# Patient Record
Sex: FEMALE | Race: WHITE | NOT HISPANIC OR LATINO | Employment: OTHER | URBAN - METROPOLITAN AREA
[De-identification: names, ages, dates, MRNs, and addresses within clinical notes are randomized per-mention and may not be internally consistent; named-entity substitution may affect disease eponyms.]

---

## 2017-11-27 ENCOUNTER — HOSPITAL ENCOUNTER (INPATIENT)
Facility: HOSPITAL | Age: 72
LOS: 5 days | Discharge: HOME | DRG: 872 | End: 2017-12-02
Attending: EMERGENCY MEDICINE | Admitting: HOSPITALIST
Payer: MEDICARE

## 2017-11-27 DIAGNOSIS — N12 PYELONEPHRITIS: Primary | ICD-10-CM

## 2017-11-27 DIAGNOSIS — N15.1 RENAL ABSCESS: ICD-10-CM

## 2017-11-27 DIAGNOSIS — E87.6 HYPOKALEMIA: ICD-10-CM

## 2017-11-27 DIAGNOSIS — E86.0 DEHYDRATION: ICD-10-CM

## 2017-11-27 DIAGNOSIS — I50.30 (HFPEF) HEART FAILURE WITH PRESERVED EJECTION FRACTION: ICD-10-CM

## 2017-11-27 DIAGNOSIS — R06.02 SHORTNESS OF BREATH: ICD-10-CM

## 2017-11-27 LAB
ALBUMIN SERPL BCP-MCNC: 2.8 G/DL
ALP SERPL-CCNC: 222 U/L
ALT SERPL W/O P-5'-P-CCNC: 49 U/L
ANION GAP SERPL CALC-SCNC: 12 MMOL/L
AST SERPL-CCNC: 28 U/L
BACTERIA #/AREA URNS HPF: ABNORMAL /HPF
BASOPHILS # BLD AUTO: 0.01 K/UL
BASOPHILS NFR BLD: 0.1 %
BILIRUB SERPL-MCNC: 0.6 MG/DL
BILIRUB UR QL STRIP: NEGATIVE
BNP SERPL-MCNC: 28 PG/ML
BUN SERPL-MCNC: 23 MG/DL
CALCIUM SERPL-MCNC: 9.6 MG/DL
CHLORIDE SERPL-SCNC: 92 MMOL/L
CK SERPL-CCNC: 76 U/L
CLARITY UR: ABNORMAL
CO2 SERPL-SCNC: 30 MMOL/L
COLOR UR: YELLOW
CREAT SERPL-MCNC: 1.6 MG/DL
D DIMER PPP IA.FEU-MCNC: 1.59 MG/L FEU
DIFFERENTIAL METHOD: ABNORMAL
EOSINOPHIL # BLD AUTO: 0 K/UL
EOSINOPHIL NFR BLD: 0.1 %
ERYTHROCYTE [DISTWIDTH] IN BLOOD BY AUTOMATED COUNT: 15.5 %
EST. GFR  (AFRICAN AMERICAN): 37 ML/MIN/1.73 M^2
EST. GFR  (NON AFRICAN AMERICAN): 32 ML/MIN/1.73 M^2
FLUAV AG SPEC QL IA: NEGATIVE
FLUBV AG SPEC QL IA: NEGATIVE
GLUCOSE SERPL-MCNC: 134 MG/DL
GLUCOSE UR QL STRIP: NEGATIVE
HCT VFR BLD AUTO: 36.9 %
HGB BLD-MCNC: 12.4 G/DL
HGB UR QL STRIP: NEGATIVE
KETONES UR QL STRIP: NEGATIVE
LACTATE SERPL-SCNC: 0.8 MMOL/L
LEUKOCYTE ESTERASE UR QL STRIP: ABNORMAL
LIPASE SERPL-CCNC: 44 U/L
LIPASE SERPL-CCNC: 46 U/L
LYMPHOCYTES # BLD AUTO: 0.2 K/UL
LYMPHOCYTES NFR BLD: 1.1 %
MAGNESIUM SERPL-MCNC: 1.8 MG/DL
MCH RBC QN AUTO: 28.4 PG
MCHC RBC AUTO-ENTMCNC: 33.6 G/DL
MCV RBC AUTO: 84 FL
MICROSCOPIC COMMENT: ABNORMAL
MONOCYTES # BLD AUTO: 0.1 K/UL
MONOCYTES NFR BLD: 0.5 %
NEUTROPHILS # BLD AUTO: 18.8 K/UL
NEUTROPHILS NFR BLD: 98.2 %
NITRITE UR QL STRIP: NEGATIVE
PH UR STRIP: 5 [PH] (ref 5–8)
PLATELET # BLD AUTO: 348 K/UL
PMV BLD AUTO: 9.2 FL
POTASSIUM SERPL-SCNC: 2.9 MMOL/L
PROT SERPL-MCNC: 7.6 G/DL
PROT UR QL STRIP: NEGATIVE
RBC # BLD AUTO: 4.37 M/UL
RBC #/AREA URNS HPF: 1 /HPF (ref 0–4)
SODIUM SERPL-SCNC: 134 MMOL/L
SP GR UR STRIP: 1 (ref 1–1.03)
SPECIMEN SOURCE: NORMAL
SQUAMOUS #/AREA URNS HPF: ABNORMAL /HPF
T4 FREE SERPL-MCNC: 1.47 NG/DL
TROPONIN I SERPL DL<=0.01 NG/ML-MCNC: 0.02 NG/ML
TSH SERPL DL<=0.005 MIU/L-ACNC: 8.15 UIU/ML
URN SPEC COLLECT METH UR: ABNORMAL
UROBILINOGEN UR STRIP-ACNC: NEGATIVE EU/DL
WBC # BLD AUTO: 19.15 K/UL
WBC #/AREA URNS HPF: 15 /HPF (ref 0–5)
WBC CLUMPS URNS QL MICRO: ABNORMAL

## 2017-11-27 PROCEDURE — 25000003 PHARM REV CODE 250: Performed by: EMERGENCY MEDICINE

## 2017-11-27 PROCEDURE — 87088 URINE BACTERIA CULTURE: CPT

## 2017-11-27 PROCEDURE — 25500020 PHARM REV CODE 255: Performed by: EMERGENCY MEDICINE

## 2017-11-27 PROCEDURE — 93010 ELECTROCARDIOGRAM REPORT: CPT | Mod: ,,, | Performed by: INTERNAL MEDICINE

## 2017-11-27 PROCEDURE — 85025 COMPLETE CBC W/AUTO DIFF WBC: CPT

## 2017-11-27 PROCEDURE — 87400 INFLUENZA A/B EACH AG IA: CPT | Mod: 59

## 2017-11-27 PROCEDURE — 83735 ASSAY OF MAGNESIUM: CPT

## 2017-11-27 PROCEDURE — 80053 COMPREHEN METABOLIC PANEL: CPT

## 2017-11-27 PROCEDURE — 87086 URINE CULTURE/COLONY COUNT: CPT

## 2017-11-27 PROCEDURE — 99285 EMERGENCY DEPT VISIT HI MDM: CPT | Mod: 25

## 2017-11-27 PROCEDURE — 96365 THER/PROPH/DIAG IV INF INIT: CPT

## 2017-11-27 PROCEDURE — 63600175 PHARM REV CODE 636 W HCPCS: Performed by: EMERGENCY MEDICINE

## 2017-11-27 PROCEDURE — 87186 SC STD MICRODIL/AGAR DIL: CPT

## 2017-11-27 PROCEDURE — 84443 ASSAY THYROID STIM HORMONE: CPT

## 2017-11-27 PROCEDURE — 83690 ASSAY OF LIPASE: CPT

## 2017-11-27 PROCEDURE — 82550 ASSAY OF CK (CPK): CPT

## 2017-11-27 PROCEDURE — 83605 ASSAY OF LACTIC ACID: CPT

## 2017-11-27 PROCEDURE — 84439 ASSAY OF FREE THYROXINE: CPT

## 2017-11-27 PROCEDURE — 93005 ELECTROCARDIOGRAM TRACING: CPT

## 2017-11-27 PROCEDURE — 83880 ASSAY OF NATRIURETIC PEPTIDE: CPT

## 2017-11-27 PROCEDURE — 96361 HYDRATE IV INFUSION ADD-ON: CPT

## 2017-11-27 PROCEDURE — 87040 BLOOD CULTURE FOR BACTERIA: CPT | Mod: 59

## 2017-11-27 PROCEDURE — 96375 TX/PRO/DX INJ NEW DRUG ADDON: CPT

## 2017-11-27 PROCEDURE — 12000002 HC ACUTE/MED SURGE SEMI-PRIVATE ROOM

## 2017-11-27 PROCEDURE — 85379 FIBRIN DEGRADATION QUANT: CPT

## 2017-11-27 PROCEDURE — 81000 URINALYSIS NONAUTO W/SCOPE: CPT

## 2017-11-27 PROCEDURE — 84484 ASSAY OF TROPONIN QUANT: CPT

## 2017-11-27 PROCEDURE — 87077 CULTURE AEROBIC IDENTIFY: CPT

## 2017-11-27 RX ORDER — PANTOPRAZOLE SODIUM 40 MG/1
40 TABLET, DELAYED RELEASE ORAL DAILY
COMMUNITY
End: 2018-09-10 | Stop reason: SDUPTHER

## 2017-11-27 RX ORDER — POTASSIUM CHLORIDE 20 MEQ/1
40 TABLET, EXTENDED RELEASE ORAL
Status: COMPLETED | OUTPATIENT
Start: 2017-11-27 | End: 2017-11-27

## 2017-11-27 RX ORDER — FLUTICASONE PROPIONATE 50 MCG
1 SPRAY, SUSPENSION (ML) NASAL DAILY
COMMUNITY

## 2017-11-27 RX ORDER — HYDROXYCHLOROQUINE SULFATE 200 MG/1
TABLET, FILM COATED ORAL DAILY
COMMUNITY
End: 2019-08-14 | Stop reason: SDUPTHER

## 2017-11-27 RX ORDER — AMOXICILLIN 500 MG
2 CAPSULE ORAL DAILY
COMMUNITY

## 2017-11-27 RX ORDER — HYDROCHLOROTHIAZIDE 25 MG/1
25 TABLET ORAL DAILY
COMMUNITY
End: 2018-03-21 | Stop reason: SDUPTHER

## 2017-11-27 RX ORDER — METOPROLOL TARTRATE 50 MG/1
50 TABLET ORAL 2 TIMES DAILY
COMMUNITY
End: 2018-03-21 | Stop reason: SDUPTHER

## 2017-11-27 RX ORDER — ROSUVASTATIN CALCIUM 40 MG/1
10 TABLET, COATED ORAL NIGHTLY
COMMUNITY
End: 2018-03-21 | Stop reason: SDUPTHER

## 2017-11-27 RX ORDER — ONDANSETRON HYDROCHLORIDE 8 MG/1
TABLET, FILM COATED ORAL EVERY 8 HOURS PRN
COMMUNITY
End: 2018-09-10

## 2017-11-27 RX ORDER — DULOXETIN HYDROCHLORIDE 30 MG/1
30 CAPSULE, DELAYED RELEASE ORAL DAILY
COMMUNITY
End: 2017-12-08

## 2017-11-27 RX ORDER — ONDANSETRON 2 MG/ML
4 INJECTION INTRAMUSCULAR; INTRAVENOUS
Status: COMPLETED | OUTPATIENT
Start: 2017-11-27 | End: 2017-11-27

## 2017-11-27 RX ORDER — BUTALBITAL, ACETAMINOPHEN AND CAFFEINE 50; 325; 40 MG/1; MG/1; MG/1
1 TABLET ORAL EVERY 4 HOURS PRN
COMMUNITY
End: 2018-09-10

## 2017-11-27 RX ORDER — LEVOTHYROXINE SODIUM 88 UG/1
88 TABLET ORAL DAILY
COMMUNITY
End: 2018-03-21 | Stop reason: SDUPTHER

## 2017-11-27 RX ADMIN — POTASSIUM CHLORIDE 40 MEQ: 1500 TABLET, EXTENDED RELEASE ORAL at 09:11

## 2017-11-27 RX ADMIN — PIPERACILLIN AND TAZOBACTAM 4.5 G: 4; .5 INJECTION, POWDER, LYOPHILIZED, FOR SOLUTION INTRAVENOUS; PARENTERAL at 11:11

## 2017-11-27 RX ADMIN — SODIUM CHLORIDE 1000 ML: 0.9 INJECTION, SOLUTION INTRAVENOUS at 08:11

## 2017-11-27 RX ADMIN — IOHEXOL 70 ML: 350 INJECTION, SOLUTION INTRAVENOUS at 10:11

## 2017-11-27 RX ADMIN — ONDANSETRON 4 MG: 2 INJECTION INTRAMUSCULAR; INTRAVENOUS at 08:11

## 2017-11-27 RX ADMIN — SODIUM CHLORIDE 1000 ML: 0.9 INJECTION, SOLUTION INTRAVENOUS at 09:11

## 2017-11-28 PROBLEM — N39.0 SEPSIS SECONDARY TO UTI: Status: ACTIVE | Noted: 2017-11-28

## 2017-11-28 PROBLEM — A41.9 SEPSIS SECONDARY TO UTI: Status: ACTIVE | Noted: 2017-11-28

## 2017-11-28 PROBLEM — D72.9 NEUTROPHILIC LEUKOCYTOSIS: Status: ACTIVE | Noted: 2017-11-28

## 2017-11-28 PROBLEM — N15.1 PERINEPHRIC ABSCESS: Status: ACTIVE | Noted: 2017-11-28

## 2017-11-28 PROBLEM — I10 HYPERTENSION: Status: ACTIVE | Noted: 2017-11-28

## 2017-11-28 PROBLEM — E07.9 THYROID DISEASE: Status: ACTIVE | Noted: 2017-11-28

## 2017-11-28 LAB
ANION GAP SERPL CALC-SCNC: 8 MMOL/L
BASOPHILS # BLD AUTO: 0.03 K/UL
BASOPHILS NFR BLD: 0.1 %
BUN SERPL-MCNC: 20 MG/DL
CALCIUM SERPL-MCNC: 8.3 MG/DL
CHLORIDE SERPL-SCNC: 102 MMOL/L
CO2 SERPL-SCNC: 28 MMOL/L
CREAT SERPL-MCNC: 1.3 MG/DL
DIFFERENTIAL METHOD: ABNORMAL
EOSINOPHIL # BLD AUTO: 0 K/UL
EOSINOPHIL NFR BLD: 0 %
ERYTHROCYTE [DISTWIDTH] IN BLOOD BY AUTOMATED COUNT: 15.5 %
EST. GFR  (AFRICAN AMERICAN): 47 ML/MIN/1.73 M^2
EST. GFR  (NON AFRICAN AMERICAN): 41 ML/MIN/1.73 M^2
GLUCOSE SERPL-MCNC: 99 MG/DL
HCT VFR BLD AUTO: 29.5 %
HGB BLD-MCNC: 9.5 G/DL
INR PPP: 1.2
LYMPHOCYTES # BLD AUTO: 0.6 K/UL
LYMPHOCYTES NFR BLD: 2.4 %
MCH RBC QN AUTO: 27.9 PG
MCHC RBC AUTO-ENTMCNC: 32.2 G/DL
MCV RBC AUTO: 87 FL
MONOCYTES # BLD AUTO: 0.4 K/UL
MONOCYTES NFR BLD: 1.7 %
NEUTROPHILS # BLD AUTO: 21.8 K/UL
NEUTROPHILS NFR BLD: 95.8 %
PLATELET # BLD AUTO: 334 K/UL
PMV BLD AUTO: 9.6 FL
POTASSIUM SERPL-SCNC: 3.5 MMOL/L
PROTHROMBIN TIME: 12.2 SEC
RBC # BLD AUTO: 3.4 M/UL
SODIUM SERPL-SCNC: 138 MMOL/L
WBC # BLD AUTO: 22.86 K/UL

## 2017-11-28 PROCEDURE — 85610 PROTHROMBIN TIME: CPT

## 2017-11-28 PROCEDURE — 93005 ELECTROCARDIOGRAM TRACING: CPT

## 2017-11-28 PROCEDURE — 25000003 PHARM REV CODE 250: Performed by: HOSPITALIST

## 2017-11-28 PROCEDURE — 12000002 HC ACUTE/MED SURGE SEMI-PRIVATE ROOM

## 2017-11-28 PROCEDURE — 99223 1ST HOSP IP/OBS HIGH 75: CPT | Mod: ,,, | Performed by: UROLOGY

## 2017-11-28 PROCEDURE — 63600175 PHARM REV CODE 636 W HCPCS: Performed by: EMERGENCY MEDICINE

## 2017-11-28 PROCEDURE — 25000003 PHARM REV CODE 250: Performed by: EMERGENCY MEDICINE

## 2017-11-28 PROCEDURE — 63600175 PHARM REV CODE 636 W HCPCS: Performed by: HOSPITALIST

## 2017-11-28 PROCEDURE — 93010 ELECTROCARDIOGRAM REPORT: CPT | Mod: ,,, | Performed by: INTERNAL MEDICINE

## 2017-11-28 PROCEDURE — 85025 COMPLETE CBC W/AUTO DIFF WBC: CPT

## 2017-11-28 PROCEDURE — 36415 COLL VENOUS BLD VENIPUNCTURE: CPT

## 2017-11-28 PROCEDURE — 80048 BASIC METABOLIC PNL TOTAL CA: CPT

## 2017-11-28 RX ORDER — ACETAMINOPHEN 325 MG/1
650 TABLET ORAL EVERY 8 HOURS PRN
Status: DISCONTINUED | OUTPATIENT
Start: 2017-11-28 | End: 2017-12-02 | Stop reason: HOSPADM

## 2017-11-28 RX ORDER — AMOXICILLIN 250 MG
1 CAPSULE ORAL 2 TIMES DAILY
Status: DISCONTINUED | OUTPATIENT
Start: 2017-11-28 | End: 2017-12-02 | Stop reason: HOSPADM

## 2017-11-28 RX ORDER — PANTOPRAZOLE SODIUM 40 MG/1
40 TABLET, DELAYED RELEASE ORAL DAILY
Status: DISCONTINUED | OUTPATIENT
Start: 2017-11-28 | End: 2017-12-02 | Stop reason: HOSPADM

## 2017-11-28 RX ORDER — METOPROLOL TARTRATE 50 MG/1
50 TABLET ORAL 2 TIMES DAILY
Status: DISCONTINUED | OUTPATIENT
Start: 2017-11-28 | End: 2017-11-28

## 2017-11-28 RX ORDER — DULOXETIN HYDROCHLORIDE 30 MG/1
30 CAPSULE, DELAYED RELEASE ORAL DAILY
Status: DISCONTINUED | OUTPATIENT
Start: 2017-11-28 | End: 2017-12-02 | Stop reason: HOSPADM

## 2017-11-28 RX ORDER — LABETALOL HYDROCHLORIDE 5 MG/ML
10 INJECTION, SOLUTION INTRAVENOUS EVERY 4 HOURS PRN
Status: DISCONTINUED | OUTPATIENT
Start: 2017-11-28 | End: 2017-12-02 | Stop reason: HOSPADM

## 2017-11-28 RX ORDER — ONDANSETRON 2 MG/ML
4 INJECTION INTRAMUSCULAR; INTRAVENOUS EVERY 8 HOURS PRN
Status: DISCONTINUED | OUTPATIENT
Start: 2017-11-28 | End: 2017-11-28

## 2017-11-28 RX ORDER — ONDANSETRON 2 MG/ML
8 INJECTION INTRAMUSCULAR; INTRAVENOUS EVERY 8 HOURS PRN
Status: DISCONTINUED | OUTPATIENT
Start: 2017-11-28 | End: 2017-12-02 | Stop reason: HOSPADM

## 2017-11-28 RX ORDER — ENOXAPARIN SODIUM 100 MG/ML
40 INJECTION SUBCUTANEOUS EVERY 24 HOURS
Status: DISCONTINUED | OUTPATIENT
Start: 2017-11-28 | End: 2017-11-28

## 2017-11-28 RX ORDER — SODIUM CHLORIDE AND POTASSIUM CHLORIDE 150; 900 MG/100ML; MG/100ML
INJECTION, SOLUTION INTRAVENOUS CONTINUOUS
Status: DISCONTINUED | OUTPATIENT
Start: 2017-11-28 | End: 2017-11-30

## 2017-11-28 RX ORDER — ROSUVASTATIN CALCIUM 10 MG/1
10 TABLET, COATED ORAL NIGHTLY
Status: DISCONTINUED | OUTPATIENT
Start: 2017-11-28 | End: 2017-12-02 | Stop reason: HOSPADM

## 2017-11-28 RX ORDER — SODIUM CHLORIDE AND POTASSIUM CHLORIDE 150; 900 MG/100ML; MG/100ML
INJECTION, SOLUTION INTRAVENOUS CONTINUOUS
Status: DISCONTINUED | OUTPATIENT
Start: 2017-11-28 | End: 2017-11-28

## 2017-11-28 RX ORDER — LEVOTHYROXINE SODIUM 88 UG/1
88 TABLET ORAL DAILY
Status: DISCONTINUED | OUTPATIENT
Start: 2017-11-28 | End: 2017-12-02 | Stop reason: HOSPADM

## 2017-11-28 RX ADMIN — PIPERACILLIN AND TAZOBACTAM 4.5 G: 4; .5 INJECTION, POWDER, LYOPHILIZED, FOR SOLUTION INTRAVENOUS; PARENTERAL at 03:11

## 2017-11-28 RX ADMIN — PANTOPRAZOLE SODIUM 40 MG: 40 TABLET, DELAYED RELEASE ORAL at 09:11

## 2017-11-28 RX ADMIN — LEVOTHYROXINE SODIUM 88 MCG: 88 TABLET ORAL at 05:11

## 2017-11-28 RX ADMIN — DOCUSATE SODIUM AND SENNOSIDES 1 TABLET: 8.6; 5 TABLET, FILM COATED ORAL at 09:11

## 2017-11-28 RX ADMIN — ACETAMINOPHEN 650 MG: 325 TABLET ORAL at 09:11

## 2017-11-28 RX ADMIN — SODIUM CHLORIDE AND POTASSIUM CHLORIDE: 9; 1.49 INJECTION, SOLUTION INTRAVENOUS at 04:11

## 2017-11-28 RX ADMIN — SODIUM CHLORIDE AND POTASSIUM CHLORIDE: 9; 1.49 INJECTION, SOLUTION INTRAVENOUS at 02:11

## 2017-11-28 RX ADMIN — ONDANSETRON 8 MG: 2 INJECTION INTRAMUSCULAR; INTRAVENOUS at 01:11

## 2017-11-28 RX ADMIN — DULOXETINE 30 MG: 30 CAPSULE, DELAYED RELEASE ORAL at 09:11

## 2017-11-28 RX ADMIN — LABETALOL HYDROCHLORIDE 10 MG: 5 INJECTION, SOLUTION INTRAVENOUS at 02:11

## 2017-11-28 RX ADMIN — PIPERACILLIN AND TAZOBACTAM 4.5 G: 4; .5 INJECTION, POWDER, LYOPHILIZED, FOR SOLUTION INTRAVENOUS; PARENTERAL at 09:11

## 2017-11-28 RX ADMIN — ACETAMINOPHEN 650 MG: 325 TABLET ORAL at 04:11

## 2017-11-28 RX ADMIN — ROSUVASTATIN CALCIUM 10 MG: 10 TABLET ORAL at 09:11

## 2017-11-28 NOTE — CONSULTS
[facility]  Urology  Consult Note    Patient Name: Fabienne Goncalves  MRN: [id number]  Admission Date: 11/27/2017  Hospital Length of Stay: 1   Code Status: Full Code   Attending Provider: Tamiko Mendosa MD   Consulting Provider: ASHLEY Moyer MD  Primary Care Physician: Primary Doctor No  Principal Problem:Sepsis secondary to UTI    Inpatient consult to Urology  Consult performed by: STEFAN MOYER  Consult ordered by: STEFAN ESCOBEDO          Subjective:     HPI:  This is a 72 year old woman who presented with 3 weeks of headache and shortness of breath.   She denies flank pain, hematuria, dysuria, or previous UTI.  She is unaware of any issues related to her kidney.   She is not sure if she has cysts on her kidney.    Past Medical History:   Diagnosis Date    Arthritis     Hypertension     Thyroid disease        Past Surgical History:   Procedure Laterality Date    HAND SURGERY      THYROIDECTOMY         Review of patient's allergies indicates:   Allergen Reactions    Meperidine     Statins-hmg-coa reductase inhibitors      Flu like symptoms.     No current facility-administered medications on file prior to encounter.      No current outpatient prescriptions on file prior to encounter.         Family History     None          Social History Main Topics    Smoking status: Current Some Day Smoker     Types: Cigarettes    Smokeless tobacco: Never Used    Alcohol use Yes    Drug use: No    Sexual activity: No       Review of Systems   Constitutional: Negative.  Negative for fever.   HENT: Negative.    Eyes: Negative.    Respiratory: Negative for cough, chest tightness and shortness of breath.    Cardiovascular: Negative for chest pain.   Gastrointestinal: Negative.  Negative for constipation, diarrhea and nausea.   Genitourinary: Negative for difficulty urinating, dysuria, flank pain and hematuria.   Musculoskeletal: Negative.    Neurological: Negative.     Psychiatric/Behavioral: Negative.        Objective:     Temp:  [97.6 °F (36.4 °C)-98.7 °F (37.1 °C)] 98.7 °F (37.1 °C)  Pulse:  [] 95  Resp:  [16-20] 18  SpO2:  [95 %-96 %] 96 %  BP: (100-131)/(51-66) 118/54     Body mass index is 27.44 kg/m².            Drains          No matching active lines, drains, or airways          Physical Exam   Nursing note and vitals reviewed.  Constitutional: She is oriented to person, place, and time. She appears well-developed.   HENT:   Head: Normocephalic.   Eyes: Conjunctivae are normal.   Neck: Normal range of motion. No tracheal deviation present. No thyromegaly present.   Cardiovascular: Normal rate, normal heart sounds and normal pulses.    Pulmonary/Chest: Effort normal and breath sounds normal. No respiratory distress. She has no wheezes.   Abdominal: Soft. She exhibits no distension and no mass. There is no hepatosplenomegaly. There is no tenderness. There is no rebound, no guarding and no CVA tenderness. No hernia.   Genitourinary:   Genitourinary Comments: No CVA tenderness   Musculoskeletal: Normal range of motion. She exhibits no edema or tenderness.   Lymphadenopathy:     She has no cervical adenopathy.   Neurological: She is alert and oriented to person, place, and time.   Skin: Skin is warm and dry. No rash noted. No erythema.     Psychiatric: She has a normal mood and affect. Her behavior is normal. Judgment and thought content normal.       Significant Labs:    BMP:    Recent Labs  Lab 11/27/17 2009 11/28/17  0544   * 138   K 2.9* 3.5   CL 92* 102   CO2 30* 28   BUN 23 20   CREATININE 1.6* 1.3   CALCIUM 9.6 8.3*       CBC:    Recent Labs  Lab 11/27/17 2009 11/28/17  0544   WBC 19.15* 22.86*   HGB 12.4 9.5*   HCT 36.9* 29.5*    334       Blood Culture:   Recent Labs  Lab 11/27/17 2320 11/27/17 2325   LABBLOO No Growth to date No Growth to date     Urine Culture: No results for input(s): LABURIN in the last 168 hours.    Significant  Imaging:  CT: I have reviewed all results within the past 24 hours and my personal findings are:  left kidney with two areas of concern.  Cyst vs abscess.  Walls are thick but difficult to tell if abscess.  Contrast only study, unable to tell if stones are present.  No obvious hydroneprhosis.                     Assessment and Plan:     Perinephric abscess    Obtain renal ultrasound to see if these areas are abscesses.  If so then she will need IR consult for percutaneous drainage.        Pyelonephritis    Follow culture  Treat x 2 weeks            VTE Risk Mitigation         Ordered     Place JUAN hose  Until discontinued      11/28/17 0518     Medium Risk of VTE  Once      11/28/17 0215          Thank you for your consult. I will follow-up with patient. Please contact us if you have any additional questions.    ASHLEY Moyer MD  Urology  [facility]

## 2017-11-28 NOTE — SUBJECTIVE & OBJECTIVE
Past Medical History:   Diagnosis Date    Arthritis     Hypertension     Thyroid disease        Past Surgical History:   Procedure Laterality Date    HAND SURGERY      THYROIDECTOMY         Review of patient's allergies indicates:   Allergen Reactions    Meperidine     Statins-hmg-coa reductase inhibitors      Flu like symptoms.     No current facility-administered medications on file prior to encounter.      No current outpatient prescriptions on file prior to encounter.         Family History     None          Social History Main Topics    Smoking status: Current Some Day Smoker     Types: Cigarettes    Smokeless tobacco: Never Used    Alcohol use Yes    Drug use: No    Sexual activity: No       Review of Systems   Constitutional: Negative.  Negative for fever.   HENT: Negative.    Eyes: Negative.    Respiratory: Negative for cough, chest tightness and shortness of breath.    Cardiovascular: Negative for chest pain.   Gastrointestinal: Negative.  Negative for constipation, diarrhea and nausea.   Genitourinary: Negative for difficulty urinating, dysuria, flank pain and hematuria.   Musculoskeletal: Negative.    Neurological: Negative.    Psychiatric/Behavioral: Negative.        Objective:     Temp:  [97.6 °F (36.4 °C)-98.7 °F (37.1 °C)] 98.7 °F (37.1 °C)  Pulse:  [] 95  Resp:  [16-20] 18  SpO2:  [95 %-96 %] 96 %  BP: (100-131)/(51-66) 118/54     Body mass index is 27.44 kg/m².            Drains          No matching active lines, drains, or airways          Physical Exam   Nursing note and vitals reviewed.  Constitutional: She is oriented to person, place, and time. She appears well-developed.   HENT:   Head: Normocephalic.   Eyes: Conjunctivae are normal.   Neck: Normal range of motion. No tracheal deviation present. No thyromegaly present.   Cardiovascular: Normal rate, normal heart sounds and normal pulses.    Pulmonary/Chest: Effort normal and breath sounds normal. No respiratory distress. She  has no wheezes.   Abdominal: Soft. She exhibits no distension and no mass. There is no hepatosplenomegaly. There is no tenderness. There is no rebound, no guarding and no CVA tenderness. No hernia.   Genitourinary:   Genitourinary Comments: No CVA tenderness   Musculoskeletal: Normal range of motion. She exhibits no edema or tenderness.   Lymphadenopathy:     She has no cervical adenopathy.   Neurological: She is alert and oriented to person, place, and time.   Skin: Skin is warm and dry. No rash noted. No erythema.     Psychiatric: She has a normal mood and affect. Her behavior is normal. Judgment and thought content normal.       Significant Labs:    BMP:    Recent Labs  Lab 11/27/17 2009 11/28/17  0544   * 138   K 2.9* 3.5   CL 92* 102   CO2 30* 28   BUN 23 20   CREATININE 1.6* 1.3   CALCIUM 9.6 8.3*       CBC:    Recent Labs  Lab 11/27/17 2009 11/28/17  0544   WBC 19.15* 22.86*   HGB 12.4 9.5*   HCT 36.9* 29.5*    334       Blood Culture:   Recent Labs  Lab 11/27/17  2320 11/27/17  2325   LABBLOO No Growth to date No Growth to date     Urine Culture: No results for input(s): LABURIN in the last 168 hours.    Significant Imaging:  CT: I have reviewed all results within the past 24 hours and my personal findings are:  left kidney with two areas of concern.  Cyst vs abscess.  Walls are thick but difficult to tell if abscess.  Contrast only study, unable to tell if stones are present.  No obvious hydroneprhosis.

## 2017-11-28 NOTE — PT/OT/SLP PROGRESS
Physical Therapy      Fabienne Goncalves  MRN: [id number]    Patient not seen at this time for PT eval secondary to Unavailable (pt off unit for medical procedures). Will follow-up as able.    Maryuri Lugo, PT

## 2017-11-28 NOTE — PLAN OF CARE
Problem: Patient Care Overview  Goal: Plan of Care Review  Outcome: Ongoing (interventions implemented as appropriate)  Patient is A/Ox4, remains free from falls, is afebrile, and states no pain.  Patient has intermittent nausea, mildly controlled with ordered zofran.  Patient BP WNL, and HR remains tachy.  Patient tolerating IV fluids and antibiotics well.  Will continue to monitor patient for pain, safety, s/s's of infection, vital signs, and nausea.

## 2017-11-28 NOTE — ED PROVIDER NOTES
"Encounter Date: 11/27/2017    SCRIBE #1 NOTE: I, Pippa Candido, am scribing for, and in the presence of,  Rustam Aleman MD. I have scribed the following portions of the note - Other sections scribed: HPI, ROS.       History     Chief Complaint   Patient presents with    Nausea     Pt states "I have trouble of holding down food x4 days with nausea." Pt states "My iron level is low, I may need to have infusions of iron if its low." Pt also c/o of episodes of cramps today      CC: Nausea    HPI: 72 year old female with HTN, thyroid disease, arthritis, and hx of iron deficiency presents to the ED c/o headaches and chronic, worsening arthralgia x 3 weeks. Pt is c/o having subjective fevers in the morning and a slight cough. Headaches are intermittent and occur every couple of days. Pt states she typically gets headaches when her iron levels are low. Pt reports she has had a decrease in appetite and nausea for the past 4-5 days. She reports having some abdominal cramping today. Pain is 6/10. Last BM was a few days but pt is still able to pass gas. Spouse reports pt had a change in mental status 2 days ago. He reports pt was not as "snappy" and seemed off.  He states he has noticed the pt gets SOB on exertion. Pt notes she was evaluated at urgent care last week and was sent to [facility] for a head CT. Pt otherwise denies dark/bloody stool, hemoptysis, dysuria, chest pain, and any other associated symptoms. No hx of cardiac are kidney diseases. No alleviating factors.      The history is provided by the patient and the spouse. No  was used.     Review of patient's allergies indicates:  No Known Allergies  Past Medical History:   Diagnosis Date    Arthritis     Hypertension     Thyroid disease      Past Surgical History:   Procedure Laterality Date    HAND SURGERY      THYROIDECTOMY       History reviewed. No pertinent family history.  Social History   Substance Use Topics    Smoking status: Current " Some Day Smoker     Types: Cigarettes    Smokeless tobacco: Never Used    Alcohol use Yes     Review of Systems   Constitutional: Positive for appetite change (decrease) and fever (subjective). Negative for chills and diaphoresis.   HENT: Negative for ear pain and sore throat.    Eyes: Negative for photophobia and visual disturbance.   Respiratory: Positive for cough and shortness of breath (on exertion).    Cardiovascular: Negative for chest pain.   Gastrointestinal: Positive for abdominal pain (cramping), constipation and nausea. Negative for blood in stool, diarrhea and vomiting.   Genitourinary: Negative for dysuria and hematuria.   Musculoskeletal: Positive for arthralgias (chronic, worsening). Negative for back pain.   Skin: Negative for rash.   Neurological: Positive for headaches.       Physical Exam     Initial Vitals [11/27/17 1937]   BP Pulse Resp Temp SpO2   (!) 121/59 (!) 145 16 97.9 °F (36.6 °C) 96 %      MAP       79.67         Physical Exam    Nursing note and vitals reviewed.  Constitutional: She appears well-developed and well-nourished. She appears distressed.   Eyes: EOM are normal. Pupils are equal, round, and reactive to light.   Neck: Normal range of motion. Neck supple. No thyromegaly present. No JVD present.   No meningeal signs.    Cardiovascular:   Tachycardia    Pulmonary/Chest: Breath sounds normal. No respiratory distress. She has no wheezes. She has no rhonchi. She has no rales. She exhibits no tenderness.   sao2 91 percent room air   Abdominal: Soft. She exhibits no distension and no mass. There is tenderness. There is no rebound and no guarding.   Musculoskeletal: Normal range of motion. She exhibits no edema or tenderness.   Neurological: She is alert and oriented to person, place, and time. She has normal strength. No cranial nerve deficit or sensory deficit.   Skin: Skin is warm and dry.         ED Course   Procedures  Labs Reviewed   CBC W/ AUTO DIFFERENTIAL - Abnormal;  Notable for the following:        Result Value    WBC 19.15 (*)     Hematocrit 36.9 (*)     RDW 15.5 (*)     Gran # 18.8 (*)     Lymph # 0.2 (*)     Mono # 0.1 (*)     Gran% 98.2 (*)     Lymph% 1.1 (*)     Mono% 0.5 (*)     All other components within normal limits   COMPREHENSIVE METABOLIC PANEL - Abnormal; Notable for the following:     Sodium 134 (*)     Potassium 2.9 (*)     Chloride 92 (*)     CO2 30 (*)     Glucose 134 (*)     Creatinine 1.6 (*)     Albumin 2.8 (*)     Alkaline Phosphatase 222 (*)     ALT 49 (*)     eGFR if  37 (*)     eGFR if non  32 (*)     All other components within normal limits   URINALYSIS - Abnormal; Notable for the following:     Appearance, UA Hazy (*)     Leukocytes, UA Trace (*)     All other components within normal limits   D DIMER, QUANTITATIVE - Abnormal; Notable for the following:     D-Dimer 1.59 (*)     All other components within normal limits   TSH - Abnormal; Notable for the following:     TSH 8.154 (*)     All other components within normal limits   URINALYSIS MICROSCOPIC - Abnormal; Notable for the following:     WBC, UA 15 (*)     WBC Clumps, UA Occasional (*)     Bacteria, UA Moderate (*)     All other components within normal limits   CULTURE, URINE   CULTURE, BLOOD   CULTURE, BLOOD   LIPASE   TROPONIN I   B-TYPE NATRIURETIC PEPTIDE   LIPASE   CK   MAGNESIUM   INFLUENZA A AND B ANTIGEN   LACTIC ACID, PLASMA   T4, FREE          X-Rays:   Independently Interpreted Readings:   Chest X-Ray: Normal heart size.  No infiltrates.  No acute abnormalities.   Abdomen: Enhancement of left kidney with cyst concerning for pyelonephritis with possible abscess.       Patient presents dehydrated with hypokalemia. HR improved to 110 with ivf's. UTi with ct evidence of pyelonephritis and potential renal abscess. Will admit for IV abx and ivf's and urology consult. Discussed with Graham Montenegro.           [place] Attestation:   Scribe #1: I performed the  above scribed service and the documentation accurately describes the services I performed. I attest to the accuracy of the note.    Attending Attestation:           Physician Attestation for Scribe:  Physician Attestation Statement for Scribe #1: I, Rustam Aleman MD, reviewed documentation, as scribed by Pippa Rey in my presence, and it is both accurate and complete.                 ED Course      Clinical Impression:   The primary encounter diagnosis was Pyelonephritis. Diagnoses of Shortness of breath, Renal abscess, Dehydration, and Hypokalemia were also pertinent to this visit.                           Rustam Aleman MD  11/28/17 0435

## 2017-11-28 NOTE — PLAN OF CARE
"SW met with patient to complete discharge needs assessment. SW provided and reviewed with patient "[facility] Packet", "Discharge Planning Begins on Admission" brochure and "Help At Home" handout. SW discussed with patient the things she will be responsible for to manage her health at home, Patient informed SW she's in need of a PCP and prefer [facility], prefers morning doctor appointments.       [id number] [place] - [address]  [address]  [place] [address]  Phone: [phone] Fax: [phone]             11/28/17 5601   Discharge Assessment   Assessment Type Discharge Planning Assessment   Confirmed/corrected address and phone number on facesheet? Yes   Assessment information obtained from? Patient   Communicated expected length of stay with patient/caregiver no   Prior to hospitilization cognitive status: Alert/Oriented;No Deficits   Prior to hospitalization functional status: Independent   Current cognitive status: Alert/Oriented   Current Functional Status: Needs Assistance   Facility Arrived From: Home   Lives With spouse   Able to Return to Prior Arrangements yes   Is patient able to care for self after discharge? Yes   Who are your caregiver(s) and their phone number(s)? Geoff [phone]   Patient's perception of discharge disposition home or selfcare   Readmission Within The Last 30 Days no previous admission in last 30 days   Patient currently being followed by outpatient case management? No   Patient currently receives any other outside agency services? No   Equipment Currently Used at Home cane, quad   Do you have any problems affording any of your prescribed medications? No   Is the patient taking medications as prescribed? yes   Does the patient have transportation home? Yes   Transportation Available car;family or friend will provide   Does the patient receive services at the [facility]? No   Discharge Plan A Home with family  (PCP F/U)   Discharge Plan " B Home with family   Patient/Family In Agreement With Plan yes

## 2017-11-28 NOTE — ASSESSMENT & PLAN NOTE
Obtain renal ultrasound to see if these areas are abscesses.  If so then she will need IR consult for percutaneous drainage.

## 2017-11-28 NOTE — PT/OT/SLP PROGRESS
Occupational Therapy      Fabienne Goncalves  MRN: [id number]     332pm Pt is not appropriate for evaluation at this time as per nursing. Will follow up tomorrow.    1039am Patient not seen today secondary to Unavailable (Comment) secondary too being off the floor for a testing. Will follow-up at another time.    Mónica Mcknight OT  11/28/2017

## 2017-11-28 NOTE — NURSING
Patient transported off unit, telemetry notified, no apparent distress, pain or SOB.  Patient transported with telemetry and IV fluids running.

## 2017-11-28 NOTE — ASSESSMENT & PLAN NOTE
· As evidenced by history, physical exam, UA, and CT imaging  · CT shows concern for possible perinephric abscess.  Urology consulted.  · 2 out of 4 SIRS criteria.  · Urine culture and Gram stain obtained prior to antibiotics  · Given empiric antibiotics  · Will tailor antibiotic regimen according to culture & sensitivity results  · Maintain euvolemic status with IV fluids  · Pending neurology consult

## 2017-11-28 NOTE — NURSING
Patient given 10 mg labetalol IV push per MD order.  BP is 114/56, , and patient states feeling better.  Patient states shivers and SOB have stopped, and is currently resting comfortably.

## 2017-11-28 NOTE — HPI
"Fabienne Goncalves is a 72 y.o. female that (in part)  has a past medical history of Arthritis; Hypertension; and Thyroid disease. Presents to [facility] complaining of nausea and abdominal pain.  Decreased appetite.  No bowel movement for 4 days.  Lethargic.  Short of breath with exertion.   reports she is not "acting herself".  She was evaluated for similar symptoms at [facility] earlier last week.  A head CT was performed which was negative.  She had progressively worsening generalized weakness without focal deficit.  Left flank pain.  Denies hematuria.  Constant duration.  This is a recurring problem for her over the last week.  No relieving factors.  No specific exacerbating factors.  No radiation of pain.    In the emergency department routine laboratory studies and urinalysis were obtained.  She had evidence of urinary tract infection and sepsis.  A CT of the abdomen and pelvis were performed which was concerning for possible perinephric abscess.  Urology has been consulted.  Cultures were obtained of blood in urine and appeared antibiotics were initiated.    Hospital medicine has been asked to admit for further evaluation and treatment.   "

## 2017-11-28 NOTE — NURSING
Patient transported back on unit from US.  No apparent distress, SOB or pain.  Telemetry notified that patient is back on unit.

## 2017-11-28 NOTE — ASSESSMENT & PLAN NOTE
Holding home antihypertensive regimen in the setting of sepsis.  Beta blockers are contraindicated.

## 2017-11-28 NOTE — ASSESSMENT & PLAN NOTE
"· CT findings are consistent with probable perinephric abscess in the setting of sepsis secondary to pyelonephritis.   · CT demonstrates: "Abnormal heterogenous enhancement pattern of the left kidney with mild striated nephrogram appearance which may be seen in setting of pyelonephritis.  There is 4.5 cm left renal hypodensity which although could represent potential cyst or complex cyst, potential renal abscess not excluded given surrounding hypoenhancement in this region.  Additional smaller 1.6 cm left renal hypodensity is seen."  · Urology consulted for further evaluation and recommendations  · Renal ultrasound pending  ·     "

## 2017-11-28 NOTE — ED TRIAGE NOTES
"PT cc Nausea Pt states "I have trouble of holding down food x4 days with nausea." Pt states "My iron level is low, I may need to have infusions of iron if its low." Pt also c/o of episodes of cramps today PT  SOB denies chest pain   "

## 2017-11-28 NOTE — HPI
This is a 72 year old woman who presented with 3 weeks of headache and shortness of breath.   She denies flank pain, hematuria, dysuria, or previous UTI.  She is unaware of any issues related to her kidney.   She is not sure if she has cysts on her kidney.

## 2017-11-28 NOTE — H&P
"[facility] Hospital Medicine  History & Physical    Patient Name: Fabienne Goncalves  MRN: [id number]  Admission Date: 11/28/2017  Attending Physician: Daren Montenegro MD, MPH      PCP:     Primary Doctor No    CC:     Chief Complaint   Patient presents with    Nausea     Pt states "I have trouble of holding down food x4 days with nausea." Pt states "My iron level is low, I may need to have infusions of iron if its low." Pt also c/o of episodes of cramps today        HISTORY OF PRESENT ILLNESS:     Fabienne Goncalves is a 72 y.o. female that (in part)  has a past medical history of Arthritis; Hypertension; and Thyroid disease. Presents to [facility] Emergency Department complaining of nausea and abdominal pain.  Decreased appetite.  No bowel movement for 4 days.  Lethargic.  Short of breath with exertion.   reports she is not "acting herself".  She was evaluated for similar symptoms at [facility] earlier last week.  A head CT was performed which was negative.  She had progressively worsening generalized weakness without focal deficit.  Left flank pain.  Denies hematuria.  Constant duration.  This is a recurring problem for her over the last week.  No relieving factors.  No specific exacerbating factors.  No radiation of pain.    In the emergency department routine laboratory studies and urinalysis were obtained.  She had evidence of urinary tract infection and sepsis.  A CT of the abdomen and pelvis were performed which was concerning for possible perinephric abscess.  Urology has been consulted.  Cultures were obtained of blood in urine and appeared antibiotics were initiated.    Hospital medicine has been asked to admit for further evaluation and treatment.       REVIEW OF SYSTEMS:     -- Constitutional: Generalized weakness and fatigue.  She denies  fever or chills.  -- Eyes: No visual changes, diplopia, pain, tearing, blind spots, or discharge. "   -- Ears, nose, mouth, throat, and face: No congestion, sore throat, epistaxis, d/c, bleeding gums, neck stiffness masses, or dental issues.  -- Respiratory: No cough, shortness of breath, hemoptysis, stridor, wheezing, or night sweats.  -- Cardiovascular: Dyspnea with exertion.  No chest pain, syncope, PND, edema, cyanosis, or palpitations.   -- Gastrointestinal: Constipation, nausea, and abdominal/flank pain.    -- Genitourinary: Left flank pain.  No hematuria, dysuria, frequency, urgency, nocturia, polyuria, stones, or incontinence.  -- Integument/breast: No rash, pruritis, pigmentation changes, dryness, or changes in hair  -- Hematologic/lymphatic: No easy bruising or lymphadenopathy.   -- Musculoskeletal: Generalized subacute muscle weakness without focal deficit.  No acute arthralgias, acute myalgias, joint swelling, acute limitations of ROM,   -- Neurological: No seizures, headaches, incoordination, paraesthesias, ataxia, vertigo, or tremors.  -- Behavioral/Psych: No auditory or visual hallucinations, depression, or suicidal/homicidal ideations.  -- Endocrine: No heat or cold intolerance, polydipsia, or unintentional weight gain / loss.  -- Allergy/Immunologic: No recurrent infections or adverse reaction to food, insects, or difficulty breathing.      PAST MEDICAL / SURGICAL HISTORY:     Past Medical History:   Diagnosis Date    Arthritis     Hypertension     Thyroid disease      Past Surgical History:   Procedure Laterality Date    HAND SURGERY      THYROIDECTOMY           FAMILY HISTORY:     Family history of cardiovascular disease      SOCIAL HISTORY:     Social History   Substance Use Topics    Smoking status: Current Some Day Smoker     Types: Cigarettes    Smokeless tobacco: Never Used    Alcohol use Yes     Social History     Social History    Marital status:      Spouse name: N/A    Number of children: N/A    Years of education: N/A     Social History Main Topics    Smoking status:  Current Some Day Smoker     Types: Cigarettes    Smokeless tobacco: Never Used    Alcohol use Yes    Drug use: No    Sexual activity: No     Other Topics Concern    None     Social History Narrative    None         ALLERGIES:       Review of patient's allergies indicates:   Allergen Reactions    Meperidine     Statins-hmg-coa reductase inhibitors      Flu like symptoms.         HEALTH SCREENING:     Influenza vaccine not up-to-date for this season.  Prevnar 13 pneumonia vaccine = no evidence of previous vaccination found in the medical record      HOME MEDICATIONS:     Prior to Admission medications    Medication Sig Start Date End Date Taking? Authorizing Provider   butalbital-acetaminophen-caffeine -40 mg (FIORICET, ESGIC) -40 mg per tablet Take 1 tablet by mouth every 4 (four) hours as needed for Pain.   Yes Historical Provider, MD   fish oil-omega-3 fatty acids 300-1,000 mg capsule Take 2 g by mouth once daily.   Yes Historical Provider, MD   fluticasone (FLONASE) 50 mcg/actuation nasal spray 1 spray by Each Nare route once daily.   Yes Historical Provider, MD   hydroCHLOROthiazide (HYDRODIURIL) 25 MG tablet Take 25 mg by mouth once daily.   Yes Historical Provider, MD   hydroxychloroquine (PLAQUENIL) 200 mg tablet Take by mouth once daily.   Yes Historical Provider, MD   levothyroxine (SYNTHROID) 88 MCG tablet Take 88 mcg by mouth once daily.   Yes Historical Provider, MD   metoprolol tartrate (LOPRESSOR) 50 MG tablet Take 50 mg by mouth 2 (two) times daily.   Yes Historical Provider, MD   multivitamin capsule Take 1 capsule by mouth once daily.   Yes Historical Provider, MD   ondansetron (ZOFRAN) 8 MG tablet Take by mouth every 8 (eight) hours as needed for Nausea.   Yes Historical Provider, MD   pantoprazole (PROTONIX) 40 MG tablet Take 40 mg by mouth once daily.   Yes Historical Provider, MD   rosuvastatin (CRESTOR) 40 MG Tab Take 10 mg by mouth every evening.   Yes Historical Provider,  "MD   DULoxetine (CYMBALTA) 30 MG capsule Take 30 mg by mouth once daily.    Historical Provider, MD          HOSPITAL MEDICATIONS:     Scheduled Meds:    DULoxetine  30 mg Oral Daily    enoxaparin  40 mg Subcutaneous Daily    levothyroxine  88 mcg Oral Daily    metoprolol tartrate  50 mg Oral BID    pantoprazole  40 mg Oral Daily    piperacillin-tazobactam (ZOSYN) IVPB  4.5 g Intravenous Q8H    rosuvastatin  10 mg Oral QHS     Continuous Infusions:    0/9% NACL & POTASSIUM CHLORIDE 20 MEQ/L 125 mL/hr at 11/28/17 0239     PRN Meds: acetaminophen, ondansetron      PHYSICAL EXAM:     Wt Readings from Last 1 Encounters:   11/28/17 0300 77.1 kg (170 lb)   11/27/17 1937 77.1 kg (170 lb)     Body mass index is 27.44 kg/m².  Vitals:    11/28/17 0029 11/28/17 0042 11/28/17 0213 11/28/17 0300   BP:   (!) 112/54    BP Location:       Patient Position:       Pulse: 108 104 101 101   Resp:   18    Temp:   97.6 °F (36.4 °C)    TempSrc:       SpO2: 96% 96% 95%    Weight:    77.1 kg (170 lb)   Height:    5' 6" (1.676 m)          -- General appearance: Ill-appearing female who is lying in bed.  well developed. appears stated age   -- Head: normocephalic, atraumatic   -- Eyes: conjunctivae clear. Extraocular muscles intact  -- Nose: Nares normal. Septum midline.   -- Mouth/Throat: Dry mucous membranes. no throat erythema.   -- Neck: supple, symmetrical, trachea midline, no JVD and thyroid not grossly enlarged, appears symmetric  -- Lungs: clear to auscultation bilaterally. normal respiratory effort. No use of accessory muscles.   -- Chest wall: no tenderness. equal bilateral chest rise   -- Heart: Rapid rate and regular rhythm. S1, S2 normal.  no click, rub or gallop   -- Abdomen: Left flank pain with percussion.  Obese.  Otherwise abdomen is soft, non-tender, non-distended, non-tympanic; bowel sounds normal; megaly exam limited by body habitus  -- Extremities: no cyanosis, clubbing or edema.   -- Pulses: 2+ and symmetric "   -- Skin: color normal, texture normal, turgor normal. No rashes or lesions.   -- Neurologic: Globally decreased muscle strength and tone. No focal numbness or weakness. CNII-XII intact. [place] coma scale: eyes open spontaneously-4, oriented & converses-5, obeys commands-6.      LABORATORY STUDIES:     Recent Results (from the past 36 hour(s))   CBC W/ AUTO DIFFERENTIAL    Collection Time: 11/27/17  8:09 PM   Result Value Ref Range    WBC 19.15 (H) 3.90 - 12.70 K/uL    RBC 4.37 4.00 - 5.40 M/uL    Hemoglobin 12.4 12.0 - 16.0 g/dL    Hematocrit 36.9 (L) 37.0 - 48.5 %    MCV 84 82 - 98 fL    MCH 28.4 27.0 - 31.0 pg    MCHC 33.6 32.0 - 36.0 g/dL    RDW 15.5 (H) 11.5 - 14.5 %    Platelets 348 150 - 350 K/uL    MPV 9.2 9.2 - 12.9 fL    Gran # 18.8 (H) 1.8 - 7.7 K/uL    Lymph # 0.2 (L) 1.0 - 4.8 K/uL    Mono # 0.1 (L) 0.3 - 1.0 K/uL    Eos # 0.0 0.0 - 0.5 K/uL    Baso # 0.01 0.00 - 0.20 K/uL    Gran% 98.2 (H) 38.0 - 73.0 %    Lymph% 1.1 (L) 18.0 - 48.0 %    Mono% 0.5 (L) 4.0 - 15.0 %    Eosinophil% 0.1 0.0 - 8.0 %    Basophil% 0.1 0.0 - 1.9 %    Differential Method Automated    Comp. Metabolic Panel    Collection Time: 11/27/17  8:09 PM   Result Value Ref Range    Sodium 134 (L) 136 - 145 mmol/L    Potassium 2.9 (L) 3.5 - 5.1 mmol/L    Chloride 92 (L) 95 - 110 mmol/L    CO2 30 (H) 23 - 29 mmol/L    Glucose 134 (H) 70 - 110 mg/dL    BUN, Bld 23 8 - 23 mg/dL    Creatinine 1.6 (H) 0.5 - 1.4 mg/dL    Calcium 9.6 8.7 - 10.5 mg/dL    Total Protein 7.6 6.0 - 8.4 g/dL    Albumin 2.8 (L) 3.5 - 5.2 g/dL    Total Bilirubin 0.6 0.1 - 1.0 mg/dL    Alkaline Phosphatase 222 (H) 55 - 135 U/L    AST 28 10 - 40 U/L    ALT 49 (H) 10 - 44 U/L    Anion Gap 12 8 - 16 mmol/L    eGFR if African American 37 (A) >60 mL/min/1.73 m^2    eGFR if non African American 32 (A) >60 mL/min/1.73 m^2   Lipase    Collection Time: 11/27/17  8:09 PM   Result Value Ref Range    Lipase 46 4 - 60 U/L   Troponin I    Collection Time: 11/27/17  8:09 PM   Result  Value Ref Range    Troponin I 0.020 0.000 - 0.026 ng/mL   D dimer, quantitative    Collection Time: 11/27/17  8:09 PM   Result Value Ref Range    D-Dimer 1.59 (H) <0.50 mg/L FEU   Brain natriuretic peptide    Collection Time: 11/27/17  8:09 PM   Result Value Ref Range    BNP 28 0 - 99 pg/mL   Lipase    Collection Time: 11/27/17  8:09 PM   Result Value Ref Range    Lipase 44 4 - 60 U/L   CK    Collection Time: 11/27/17  8:09 PM   Result Value Ref Range    CPK 76 20 - 180 U/L   Magnesium    Collection Time: 11/27/17  8:09 PM   Result Value Ref Range    Magnesium 1.8 1.6 - 2.6 mg/dL   TSH    Collection Time: 11/27/17  8:09 PM   Result Value Ref Range    TSH 8.154 (H) 0.400 - 4.000 uIU/mL   T4, free    Collection Time: 11/27/17  8:09 PM   Result Value Ref Range    Free T4 1.47 0.71 - 1.51 ng/dL   Urinalysis - Clean Catch    Collection Time: 11/27/17  9:31 PM   Result Value Ref Range    Specimen UA Urine, Clean Catch     Color, UA Yellow Yellow, Straw, Kerri    Appearance, UA Hazy (A) Clear    pH, UA 5.0 5.0 - 8.0    Specific Gravity, UA 1.005 1.005 - 1.030    Protein, UA Negative Negative    Glucose, UA Negative Negative    Ketones, UA Negative Negative    Bilirubin (UA) Negative Negative    Occult Blood UA Negative Negative    Nitrite, UA Negative Negative    Urobilinogen, UA Negative <2.0 EU/dL    Leukocytes, UA Trace (A) Negative   Urinalysis Microscopic    Collection Time: 11/27/17  9:31 PM   Result Value Ref Range    RBC, UA 1 0 - 4 /hpf    WBC, UA 15 (H) 0 - 5 /hpf    WBC Clumps, UA Occasional (A) None-Rare    Bacteria, UA Moderate (A) None-Occ /hpf    Squam Epithel, UA Few /hpf    Microscopic Comment SEE COMMENT    Influenza antigen Nasopharyngeal Swab    Collection Time: 11/27/17 10:02 PM   Result Value Ref Range    Influenza A Ag, EIA Negative Negative    Influenza B Ag, EIA Negative Negative    Flu A & B Source Nasopharyngeal Swab    Lactic acid, plasma    Collection Time: 11/27/17 11:25 PM   Result Value Ref  Range    Lactate (Lactic Acid) 0.8 0.5 - 2.2 mmol/L           MICROBIOLOGY DATA:         Microbiology x 7d:   Microbiology Results (last 7 days)     Procedure Component Value Units Date/Time    Blood Culture #2 **CANNOT BE ORDERED STAT** [[id number]] Collected:  11/27/17 2320    Order Status:  Sent Specimen:  Blood from Peripheral, Hand, Right Updated:  11/27/17 2336    Blood Culture #1 **CANNOT BE ORDERED STAT** [[id number]] Collected:  11/27/17 2325    Order Status:  Sent Specimen:  Blood from Peripheral, Hand, Left Updated:  11/27/17 2335    Urine culture **CANNOT BE ORDERED STAT** [[id number]] Collected:  11/27/17 2131    Order Status:  Sent Specimen:  Urine from Urine, Clean Catch Updated:  11/27/17 2245            IMAGING:     Imaging Results          CTA Chest Non-Coronary (PE Study) (Final result)  Result time 11/27/17 22:55:59    Final result by Rosa Duncan MD (11/27/17 22:55:59)                 Impression:      1.  Abnormal heterogenous enhancement pattern of the left kidney with mild striated nephrogram appearance which may be seen in setting of pyelonephritis.  There is 4.5 cm left renal hypodensity which although could represent potential cyst or complex cyst, potential renal abscess not excluded given surrounding hypoenhancement in this region.  Additional smaller 1.6 cm left renal hypodensity is seen.    2.  No evidence of PE.    3.  Small pericardial effusion.    4.  Additional findings as detailed above.      Electronically signed by: ROSA DUNCAN MD  Date:     11/27/17  Time:    22:55              Narrative:    Clinical indication: 72-year-old female with shortness of breath, fever, abdominal pain, and vomiting.    Comparison: None.    Technique: CTA PE protocol was performed of the chest.  This was followed by CT of the abdomen and pelvis.  70 cc Omnipaque 350 IV contrast was administered.    Findings:  Structures of the base of the neck are unremarkable.  Aorta is non-aneurysmal.  No evidence  of dissection.  Pulmonary arteries distribute normally.  No intraluminal filling defects to suggest pulmonary thromboembolus.  Heart is normal in size with small pericardial effusion.    Airways are patent.  Lungs are symmetrically expanded.  Mild platelike atelectatic changes are seen at the lung bases.  Mild centrilobular emphysematous changes are seen within the upper lobes.    Liver is mildly enlarged.  Right hepatic lobe cyst is visualized.  There is no intra-or extrahepatic biliary ductal dilatation.  The gallbladder is unremarkable.  Spleen is mildly enlarged.  There is moderate size hiatal hernia.  Pancreas and right adrenal gland are unremarkable.  There is nodular thickening of the left adrenal gland.    Kidneys are functioning.  There is heterogenous enhancement pattern of the left kidney with mild striated nephrogram appearance which may be seen in setting of pyelonephritis.  There is 4.5 cm left renal hypodensity which measures higher than simple fluid density.  Although this could represent renal cyst or complex cyst, potential abscess is a possibility given the aforementioned findings and relative hypoenhancement of the immediate adjacent surrounding renal parenchyma.  Additional 1.6 cm hypodensity is visualized within the superior pole of the left which measures higher than simple fluid density.  Ureters are unremarkable along their courses.  Urinary bladder and uterus show no significant abnormalities.    There is mild sigmoid diverticulosis without evidence for acute diverticulitis.  The visualized loops of small and large bowel show no evidence of obstruction or inflammation.  No free air or free fluid.    Aorta tapers normally with moderate atherosclerosis.     Osseous structures are unremarkable.  Scattered vertebral body hemangiomas are noted.  There is suspected in small sebaceous cyst visualized within the left mid back paramidline region.                             CT Abdomen Pelvis With  Contrast (Final result)  Result time 11/27/17 22:56:00    Final result by Rosa Duncan MD (11/27/17 22:56:00)                 Impression:      1.  Abnormal heterogenous enhancement pattern of the left kidney with mild striated nephrogram appearance which may be seen in setting of pyelonephritis.  There is 4.5 cm left renal hypodensity which although could represent potential cyst or complex cyst, potential renal abscess not excluded given surrounding hypoenhancement in this region.  Additional smaller 1.6 cm left renal hypodensity is seen.    2.  No evidence of PE.    3.  Small pericardial effusion.    4.  Additional findings as detailed above.      Electronically signed by: ROSA DUNCAN MD  Date:     11/27/17  Time:    22:55              Narrative:    Clinical indication: 72-year-old female with shortness of breath, fever, abdominal pain, and vomiting.    Comparison: None.    Technique: CTA PE protocol was performed of the chest.  This was followed by CT of the abdomen and pelvis.  70 cc Omnipaque 350 IV contrast was administered.    Findings:  Structures of the base of the neck are unremarkable.  Aorta is non-aneurysmal.  No evidence of dissection.  Pulmonary arteries distribute normally.  No intraluminal filling defects to suggest pulmonary thromboembolus.  Heart is normal in size with small pericardial effusion.    Airways are patent.  Lungs are symmetrically expanded.  Mild platelike atelectatic changes are seen at the lung bases.  Mild centrilobular emphysematous changes are seen within the upper lobes.    Liver is mildly enlarged.  Right hepatic lobe cyst is visualized.  There is no intra-or extrahepatic biliary ductal dilatation.  The gallbladder is unremarkable.  Spleen is mildly enlarged.  There is moderate size hiatal hernia.  Pancreas and right adrenal gland are unremarkable.  There is nodular thickening of the left adrenal gland.    Kidneys are functioning.  There is heterogenous enhancement  pattern of the left kidney with mild striated nephrogram appearance which may be seen in setting of pyelonephritis.  There is 4.5 cm left renal hypodensity which measures higher than simple fluid density.  Although this could represent renal cyst or complex cyst, potential abscess is a possibility given the aforementioned findings and relative hypoenhancement of the immediate adjacent surrounding renal parenchyma.  Additional 1.6 cm hypodensity is visualized within the superior pole of the left which measures higher than simple fluid density.  Ureters are unremarkable along their courses.  Urinary bladder and uterus show no significant abnormalities.    There is mild sigmoid diverticulosis without evidence for acute diverticulitis.  The visualized loops of small and large bowel show no evidence of obstruction or inflammation.  No free air or free fluid.    Aorta tapers normally with moderate atherosclerosis.     Osseous structures are unremarkable.  Scattered vertebral body hemangiomas are noted.  There is suspected in small sebaceous cyst visualized within the left mid back paramidline region.                             X-Ray Chest 1 View (Final result)  Result time 11/27/17 21:09:54    Final result by Rosa Duncan MD (11/27/17 21:09:54)                 Impression:      As above.      Electronically signed by: ROSA DUNCAN MD  Date:     11/27/17  Time:    21:09              Narrative:    Chest AP single view.  Comparison: None.    Cardiac silhouette is normal in size.  Mediastinal structures are midline.  Lungs are symmetrically expanded.  There is increased bilateral interstitial attenuation which may represent chronic finding or be seen in setting of mild interstitial edema.  No evidence of focal consolidative process, pneumothorax, or significant effusion.  There is remote displaced nonunited mid right clavicular fracture.                                CONSULTS:     IP CONSULT TO UROLOGY       ASSESSMENT  "& PLAN:     Primary Diagnosis:  Sepsis secondary to UTI    Active Hospital Problems    Diagnosis  POA    *Sepsis secondary to UTI [A41.9, N39.0]  Yes     Priority: 1 - High    Pyelonephritis [N12]  Yes     Priority: 2     Perinephric abscess [N15.1]  Yes     Priority: 3     Neutrophilic leukocytosis [D72.9]  Yes    Hypertension [I10]  Yes    Thyroid disease [E07.9]  Yes      Resolved Hospital Problems    Diagnosis Date Resolved POA   No resolved problems to display.         Sepsis secondary to UTI  · As evidenced by history, physical exam, UA, and CT imaging  · CT shows concern for possible perinephric abscess.  Urology consulted.  · 2 out of 4 SIRS criteria.  · Urine culture and Gram stain obtained prior to antibiotics  · Given empiric antibiotics  · Will tailor antibiotic regimen according to culture & sensitivity results  · Maintain euvolemic status with IV fluids  · Pending neurology consult      Pyelonephritis  Management of pyelonephritis as noted above.      Perinephric abscess  · CT findings are consistent with probable perinephric abscess in the setting of sepsis secondary to pyelonephritis.   · CT demonstrates: "Abnormal heterogenous enhancement pattern of the left kidney with mild striated nephrogram appearance which may be seen in setting of pyelonephritis.  There is 4.5 cm left renal hypodensity which although could represent potential cyst or complex cyst, potential renal abscess not excluded given surrounding hypoenhancement in this region.  Additional smaller 1.6 cm left renal hypodensity is seen."  · Urology consulted for further evaluation and recommendations  · Renal ultrasound pending  ·       Hypertension  Holding home antihypertensive regimen in the setting of sepsis.  Beta blockers are contraindicated.      Neutrophilic leukocytosis  Secondary to sepsis associated with urinary tract infection as noted above.  Trending wbc's.      Thyroid disease  · Clinically, patient is euthyroid "   · Chemically, patient seems hypothyroid  · Obtain TSH, free T3, and free T4  · Continue current regimen          VTE Risk Mitigation         Ordered     enoxaparin injection 40 mg  Daily     Route:  Subcutaneous        11/28/17 0215     Medium Risk of VTE  Once      11/28/17 0215            Adult PRN medications available   DVT prophylaxis given       DISPOSITION:     Will admit to the Hospital Medicine service for further evaluation and treatment.    Chart reviewed and updated where applicable.    High Risk Conditions:  Patient has a condition that poses threat to life and bodily function: Sepsis secondary to pyelonephritis      ===============================================================    Daren Montenegro MD, MPH  Department of Hospital Medicine   [facility]  [phone] pg  (7pm - 6am)          This note is dictated using Dragon Medical 360 voice recognition software.  There are word recognition mistakes that are occasionally missed on review.

## 2017-11-28 NOTE — ASSESSMENT & PLAN NOTE
· Clinically, patient is euthyroid   · Chemically, patient seems hypothyroid  · Obtain TSH, free T3, and free T4  · Continue current regimen

## 2017-11-29 PROBLEM — N39.0 UTI (URINARY TRACT INFECTION): Status: ACTIVE | Noted: 2017-11-28

## 2017-11-29 PROBLEM — R00.0 SINUS TACHYCARDIA: Status: ACTIVE | Noted: 2017-11-29

## 2017-11-29 LAB
ANION GAP SERPL CALC-SCNC: 8 MMOL/L
BACTERIA UR CULT: NORMAL
BASOPHILS # BLD AUTO: 0 K/UL
BASOPHILS NFR BLD: 0 %
BUN SERPL-MCNC: 16 MG/DL
CALCIUM SERPL-MCNC: 7.9 MG/DL
CHLORIDE SERPL-SCNC: 103 MMOL/L
CO2 SERPL-SCNC: 25 MMOL/L
CREAT SERPL-MCNC: 1.2 MG/DL
DIASTOLIC DYSFUNCTION: YES
DIFFERENTIAL METHOD: ABNORMAL
EOSINOPHIL # BLD AUTO: 0 K/UL
EOSINOPHIL NFR BLD: 0 %
ERYTHROCYTE [DISTWIDTH] IN BLOOD BY AUTOMATED COUNT: 15.9 %
EST. GFR  (AFRICAN AMERICAN): 52 ML/MIN/1.73 M^2
EST. GFR  (NON AFRICAN AMERICAN): 45 ML/MIN/1.73 M^2
ESTIMATED PA SYSTOLIC PRESSURE: 26.62
GLOBAL PERICARDIAL EFFUSION: ABNORMAL
GLUCOSE SERPL-MCNC: 115 MG/DL
HCT VFR BLD AUTO: 27.4 %
HGB BLD-MCNC: 8.9 G/DL
LYMPHOCYTES # BLD AUTO: 0.2 K/UL
LYMPHOCYTES NFR BLD: 1.3 %
MAGNESIUM SERPL-MCNC: 1.5 MG/DL
MCH RBC QN AUTO: 27.7 PG
MCHC RBC AUTO-ENTMCNC: 32.5 G/DL
MCV RBC AUTO: 85 FL
MITRAL VALVE MOBILITY: NORMAL
MONOCYTES # BLD AUTO: 0.2 K/UL
MONOCYTES NFR BLD: 1.7 %
NEUTROPHILS # BLD AUTO: 13.4 K/UL
NEUTROPHILS NFR BLD: 97 %
PHOSPHATE SERPL-MCNC: 1.6 MG/DL
PLATELET # BLD AUTO: 256 K/UL
PMV BLD AUTO: 9.7 FL
POTASSIUM SERPL-SCNC: 2.9 MMOL/L
RBC # BLD AUTO: 3.21 M/UL
RETIRED EF AND QEF - SEE NOTES: 55 (ref 55–65)
SODIUM SERPL-SCNC: 136 MMOL/L
TRICUSPID VALVE REGURGITATION: ABNORMAL
WBC # BLD AUTO: 13.85 K/UL

## 2017-11-29 PROCEDURE — 27000221 HC OXYGEN, UP TO 24 HOURS

## 2017-11-29 PROCEDURE — 12000002 HC ACUTE/MED SURGE SEMI-PRIVATE ROOM

## 2017-11-29 PROCEDURE — 83735 ASSAY OF MAGNESIUM: CPT

## 2017-11-29 PROCEDURE — 80048 BASIC METABOLIC PNL TOTAL CA: CPT

## 2017-11-29 PROCEDURE — S0028 INJECTION, FAMOTIDINE, 20 MG: HCPCS | Performed by: HOSPITALIST

## 2017-11-29 PROCEDURE — 99232 SBSQ HOSP IP/OBS MODERATE 35: CPT | Mod: ,,, | Performed by: UROLOGY

## 2017-11-29 PROCEDURE — 63600175 PHARM REV CODE 636 W HCPCS: Performed by: HOSPITALIST

## 2017-11-29 PROCEDURE — 25000003 PHARM REV CODE 250: Performed by: HOSPITALIST

## 2017-11-29 PROCEDURE — 97535 SELF CARE MNGMENT TRAINING: CPT

## 2017-11-29 PROCEDURE — 94761 N-INVAS EAR/PLS OXIMETRY MLT: CPT

## 2017-11-29 PROCEDURE — 93306 TTE W/DOPPLER COMPLETE: CPT

## 2017-11-29 PROCEDURE — 63600175 PHARM REV CODE 636 W HCPCS: Performed by: EMERGENCY MEDICINE

## 2017-11-29 PROCEDURE — 93306 TTE W/DOPPLER COMPLETE: CPT | Mod: 26,,, | Performed by: INTERNAL MEDICINE

## 2017-11-29 PROCEDURE — 94640 AIRWAY INHALATION TREATMENT: CPT

## 2017-11-29 PROCEDURE — 36415 COLL VENOUS BLD VENIPUNCTURE: CPT

## 2017-11-29 PROCEDURE — 25000242 PHARM REV CODE 250 ALT 637 W/ HCPCS: Performed by: HOSPITALIST

## 2017-11-29 PROCEDURE — 25000003 PHARM REV CODE 250: Performed by: EMERGENCY MEDICINE

## 2017-11-29 PROCEDURE — 97161 PT EVAL LOW COMPLEX 20 MIN: CPT

## 2017-11-29 PROCEDURE — G8979 MOBILITY GOAL STATUS: HCPCS | Mod: CH

## 2017-11-29 PROCEDURE — 84100 ASSAY OF PHOSPHORUS: CPT

## 2017-11-29 PROCEDURE — 85025 COMPLETE CBC W/AUTO DIFF WBC: CPT

## 2017-11-29 PROCEDURE — G8980 MOBILITY D/C STATUS: HCPCS | Mod: CI

## 2017-11-29 PROCEDURE — 97166 OT EVAL MOD COMPLEX 45 MIN: CPT

## 2017-11-29 PROCEDURE — G8978 MOBILITY CURRENT STATUS: HCPCS | Mod: CI

## 2017-11-29 RX ORDER — DIPHENHYDRAMINE HYDROCHLORIDE 50 MG/ML
50 INJECTION INTRAMUSCULAR; INTRAVENOUS ONCE
Status: COMPLETED | OUTPATIENT
Start: 2017-11-29 | End: 2017-11-29

## 2017-11-29 RX ORDER — LEVALBUTEROL 1.25 MG/.5ML
1.25 SOLUTION, CONCENTRATE RESPIRATORY (INHALATION) EVERY 8 HOURS
Status: DISCONTINUED | OUTPATIENT
Start: 2017-11-29 | End: 2017-11-29

## 2017-11-29 RX ORDER — FAMOTIDINE 10 MG/ML
20 INJECTION INTRAVENOUS ONCE
Status: COMPLETED | OUTPATIENT
Start: 2017-11-29 | End: 2017-11-29

## 2017-11-29 RX ORDER — MEROPENEM AND SODIUM CHLORIDE 500 MG/50ML
500 INJECTION, SOLUTION INTRAVENOUS
Status: DISCONTINUED | OUTPATIENT
Start: 2017-11-29 | End: 2017-11-30

## 2017-11-29 RX ORDER — EPINEPHRINE 0.3 MG/.3ML
0.3 INJECTION SUBCUTANEOUS ONCE AS NEEDED
Status: DISPENSED | OUTPATIENT
Start: 2017-11-29 | End: 2017-11-29

## 2017-11-29 RX ORDER — LANOLIN ALCOHOL/MO/W.PET/CERES
400 CREAM (GRAM) TOPICAL ONCE
Status: COMPLETED | OUTPATIENT
Start: 2017-11-29 | End: 2017-11-29

## 2017-11-29 RX ORDER — LABETALOL HYDROCHLORIDE 5 MG/ML
10 INJECTION, SOLUTION INTRAVENOUS ONCE
Status: DISCONTINUED | OUTPATIENT
Start: 2017-11-29 | End: 2017-12-02 | Stop reason: HOSPADM

## 2017-11-29 RX ADMIN — ONDANSETRON 8 MG: 2 INJECTION INTRAMUSCULAR; INTRAVENOUS at 01:11

## 2017-11-29 RX ADMIN — LEVALBUTEROL 1.25 MG: 1.25 SOLUTION, CONCENTRATE RESPIRATORY (INHALATION) at 01:11

## 2017-11-29 RX ADMIN — ROSUVASTATIN CALCIUM 10 MG: 10 TABLET ORAL at 09:11

## 2017-11-29 RX ADMIN — MEROPENEM AND SODIUM CHLORIDE 500 MG: 500 INJECTION, SOLUTION INTRAVENOUS at 09:11

## 2017-11-29 RX ADMIN — MAGNESIUM OXIDE TAB 400 MG (241.3 MG ELEMENTAL MG) 400 MG: 400 (241.3 MG) TAB at 08:11

## 2017-11-29 RX ADMIN — MEROPENEM AND SODIUM CHLORIDE 500 MG: 500 INJECTION, SOLUTION INTRAVENOUS at 02:11

## 2017-11-29 RX ADMIN — DULOXETINE 30 MG: 30 CAPSULE, DELAYED RELEASE ORAL at 08:11

## 2017-11-29 RX ADMIN — LEVOTHYROXINE SODIUM 88 MCG: 88 TABLET ORAL at 05:11

## 2017-11-29 RX ADMIN — ACETAMINOPHEN 650 MG: 325 TABLET ORAL at 05:11

## 2017-11-29 RX ADMIN — PIPERACILLIN AND TAZOBACTAM 4.5 G: 4; .5 INJECTION, POWDER, LYOPHILIZED, FOR SOLUTION INTRAVENOUS; PARENTERAL at 12:11

## 2017-11-29 RX ADMIN — POTASSIUM BICARBONATE 50 MEQ: 25 TABLET, EFFERVESCENT ORAL at 08:11

## 2017-11-29 RX ADMIN — PANTOPRAZOLE SODIUM 40 MG: 40 TABLET, DELAYED RELEASE ORAL at 08:11

## 2017-11-29 RX ADMIN — FAMOTIDINE 20 MG: 10 INJECTION INTRAVENOUS at 01:11

## 2017-11-29 RX ADMIN — DIPHENHYDRAMINE HYDROCHLORIDE 50 MG: 50 INJECTION, SOLUTION INTRAMUSCULAR; INTRAVENOUS at 01:11

## 2017-11-29 RX ADMIN — POTASSIUM PHOSPHATE, MONOBASIC 500 MG: 500 TABLET, SOLUBLE ORAL at 08:11

## 2017-11-29 RX ADMIN — SODIUM CHLORIDE AND POTASSIUM CHLORIDE: 9; 1.49 INJECTION, SOLUTION INTRAVENOUS at 07:11

## 2017-11-29 RX ADMIN — LABETALOL HYDROCHLORIDE 10 MG: 5 INJECTION, SOLUTION INTRAVENOUS at 01:11

## 2017-11-29 RX ADMIN — ACETAMINOPHEN 650 MG: 325 TABLET ORAL at 09:11

## 2017-11-29 RX ADMIN — DOXYCYCLINE 100 MG: 100 INJECTION, POWDER, LYOPHILIZED, FOR SOLUTION INTRAVENOUS at 08:11

## 2017-11-29 RX ADMIN — DOCUSATE SODIUM AND SENNOSIDES 1 TABLET: 8.6; 5 TABLET, FILM COATED ORAL at 08:11

## 2017-11-29 NOTE — ASSESSMENT & PLAN NOTE
· GNR on culture,CT findings are consistent with probable perinephric abscess in the setting of sepsis secondary to pyelonephritis. But kidney Us showed no sign of abscess.  · Will continue with IV Abx

## 2017-11-29 NOTE — PLAN OF CARE
Problem: Patient Care Overview  Goal: Plan of Care Review  Outcome: Ongoing (interventions implemented as appropriate)  Patient is A/Ox4, remains free from falls, is afebrile, and states no pain. Patient is tolerating IV antibiotics well, no SOB, decrease in temp, chills or stated discomfort.  Patient ambulates to restroom with assitance.  Patient tolerating oxygen per NC well, displays dypsnea with exertion.  Patient states no nausea, or pain.  Will continue to monitor patient for pain, safety, s/s's of infection, and antibiotic tolerance.

## 2017-11-29 NOTE — SUBJECTIVE & OBJECTIVE
Interval History: Feeling better.    Review of Systems   Constitutional: Negative.    HENT: Negative.    Eyes: Negative.    Respiratory: Negative for cough, chest tightness and shortness of breath.    Cardiovascular: Negative for chest pain.   Gastrointestinal: Negative.  Negative for constipation, diarrhea and nausea.   Musculoskeletal: Negative.    Neurological: Negative.    Psychiatric/Behavioral: Negative.      Objective:     Temp:  [97.5 °F (36.4 °C)-99 °F (37.2 °C)] 98.3 °F (36.8 °C)  Pulse:  [] 98  Resp:  [18-28] 18  SpO2:  [93 %-99 %] 96 %  BP: (102-219)/(53-95) 117/56     Body mass index is 27.44 kg/m².            Drains          No matching active lines, drains, or airways          Physical Exam   Nursing note and vitals reviewed.  Constitutional: She is oriented to person, place, and time. She appears well-developed.   HENT:   Head: Normocephalic.   Eyes: Conjunctivae are normal.   Neck: Normal range of motion. No tracheal deviation present. No thyromegaly present.   Cardiovascular: Normal rate, normal heart sounds and normal pulses.    Pulmonary/Chest: Effort normal and breath sounds normal. No respiratory distress. She has no wheezes.   Abdominal: Soft. She exhibits no distension and no mass. There is no hepatosplenomegaly. There is no tenderness. There is no rebound, no guarding and no CVA tenderness. No hernia.   Musculoskeletal: Normal range of motion. She exhibits no edema or tenderness.   Lymphadenopathy:     She has no cervical adenopathy.   Neurological: She is alert and oriented to person, place, and time.   Skin: Skin is warm and dry. No rash noted. No erythema.     Psychiatric: She has a normal mood and affect. Her behavior is normal. Judgment and thought content normal.       Significant Labs:    BMP:    Recent Labs  Lab 11/27/17 2009 11/28/17  0544 11/29/17  0441   * 138 136   K 2.9* 3.5 2.9*   CL 92* 102 103   CO2 30* 28 25   BUN 23 20 16   CREATININE 1.6* 1.3 1.2   CALCIUM 9.6  8.3* 7.9*       CBC:     Recent Labs  Lab 11/27/17 2009 11/28/17  0544 11/29/17  0441   WBC 19.15* 22.86* 13.85*   HGB 12.4 9.5* 8.9*   HCT 36.9* 29.5* 27.4*    334 256       Blood Culture:   Recent Labs  Lab 11/27/17 2320 11/27/17  2325   LABBLOO No Growth to date  No Growth to date No Growth to date  No Growth to date     Urine Culture:   Recent Labs  Lab 11/27/17  2131   LABURIN GRAM NEGATIVE BON>100,000 cfu/mlIdentification and susceptibility pending       Significant Imaging:  U/S: I have reviewed all results within the past 24 hours and my personal findings are:  Left kidney with cyst, not abscess

## 2017-11-29 NOTE — NURSING
Patient afebrile, no complaints of chills or pain, no SOB.  Administered doxycycline as ordered, will monitor patient closely for s.s's of reaction.  Patient educated on s/s's to alert staff to, pt stated understanding.

## 2017-11-29 NOTE — SUBJECTIVE & OBJECTIVE
Interval History: patient is sleeping very comfortable.    Review of Systems   Constitutional: Positive for activity change and appetite change.   HENT: Negative for congestion and dental problem.    Eyes: Negative for discharge and itching.   Respiratory: Negative for apnea and chest tightness.    Cardiovascular: Negative for chest pain and leg swelling.   Gastrointestinal: Negative for abdominal distention and abdominal pain.   Endocrine: Negative for heat intolerance.   Genitourinary: Negative for difficulty urinating and dyspareunia.   Musculoskeletal: Positive for myalgias.   Skin: Negative for pallor.   Allergic/Immunologic: Negative for environmental allergies and food allergies.   Neurological: Negative for dizziness and facial asymmetry.   Hematological: Negative for adenopathy. Does not bruise/bleed easily.     Objective:     Vital Signs (Most Recent):  Temp: 97.8 °F (36.6 °C) (11/29/17 0411)  Pulse: (!) 113 (11/29/17 0411)  Resp: 18 (11/29/17 0411)  BP: (!) 111/56 (11/29/17 0411)  SpO2: 98 % (11/29/17 0411) Vital Signs (24h Range):  Temp:  [97.5 °F (36.4 °C)-99 °F (37.2 °C)] 97.8 °F (36.6 °C)  Pulse:  [] 113  Resp:  [18-28] 18  SpO2:  [93 %-99 %] 98 %  BP: (102-219)/(53-95) 111/56     Weight: 77.1 kg (170 lb)  Body mass index is 27.44 kg/m².    Intake/Output Summary (Last 24 hours) at 11/29/17 0645  Last data filed at 11/28/17 1400   Gross per 24 hour   Intake              240 ml   Output                0 ml   Net              240 ml      Physical Exam   Constitutional: She is oriented to person, place, and time. No distress.   HENT:   Head: Atraumatic.   Eyes: EOM are normal. Pupils are equal, round, and reactive to light.   Neck: Normal range of motion. Neck supple.   Cardiovascular: Regular rhythm.    Pulmonary/Chest: Breath sounds normal.   Abdominal: Soft. Bowel sounds are normal.   Musculoskeletal: Normal range of motion.   Neurological: She is oriented to person, place, and time. No cranial  nerve deficit.   Skin: She is not diaphoretic.   Psychiatric: She has a normal mood and affect. Her behavior is normal.       Significant Labs:   BMP:   Recent Labs  Lab 11/29/17  0441   *      K 2.9*      CO2 25   BUN 16   CREATININE 1.2   CALCIUM 7.9*   MG 1.5*     CBC:   Recent Labs  Lab 11/27/17 2009 11/28/17  0544 11/29/17 0441   WBC 19.15* 22.86* 13.85*   HGB 12.4 9.5* 8.9*   HCT 36.9* 29.5* 27.4*    334 256       Significant Imaging: reviewed

## 2017-11-29 NOTE — NURSING
Patient transported back to unit from US.  No apparent distress, pain or SOB.  Telemetry notified.

## 2017-11-29 NOTE — PLAN OF CARE
Problem: Physical Therapy Goal  Goal: Physical Therapy Goal  Goals to be met by: 17     Patient will increase functional independence with mobility by performin. Sit to stand transfer with [place].  2. Bed to chair transfer with [place].  3. Gait  x 1000 feet with [place] on room air.   4. Lower extremity exercise program x30 reps per handout, with independence.      Patient ambulated ~250ft on room air with stand-by assistance. Patient had minor complaints of SOB towards end of ambulation however SpO2 was at 94%. Patient is safe to ambulate the hallways with nursing staff assistance.

## 2017-11-29 NOTE — PROGRESS NOTES
"[facility] Hospital Medicine  Progress Note    Patient Name: Fabienne Goncalves  MRN: [id number]  Patient Class: IP- Inpatient   Admission Date: 11/27/2017  Length of Stay: 2 days  Attending Physician: Tamiko Mendosa MD  Primary Care Provider: Primary Doctor No        Subjective:     Principal Problem:Sepsis secondary to UTI    HPI:  Fabienne Goncalves is a 72 y.o. female that (in part)  has a past medical history of Arthritis; Hypertension; and Thyroid disease. Presents to [facility] Emergency Department complaining of nausea and abdominal pain.  Decreased appetite.  No bowel movement for 4 days.  Lethargic.  Short of breath with exertion.   reports she is not "acting herself".  She was evaluated for similar symptoms at [facility] earlier last week.  A head CT was performed which was negative.  She had progressively worsening generalized weakness without focal deficit.  Left flank pain.  Denies hematuria.  Constant duration.  This is a recurring problem for her over the last week.  No relieving factors.  No specific exacerbating factors.  No radiation of pain.    In the emergency department routine laboratory studies and urinalysis were obtained.  She had evidence of urinary tract infection and sepsis.  A CT of the abdomen and pelvis were performed which was concerning for possible perinephric abscess.  Urology has been consulted.  Cultures were obtained of blood in urine and appeared antibiotics were initiated.    Hospital medicine has been asked to admit for further evaluation and treatment.     Hospital Course:  Fabienne Goncalves is a 72 y.o. female that (in part)  has a past medical history of Arthritis; Hypertension; and Thyroid disease. Presents to [facility] complaining of nausea and abdominal pain.  Decreased appetite.  No bowel movement for 4 days.  Lethargic.  Short of breath with exertion.  " " reports she is not "acting herself".  She was evaluated for similar symptoms at [facility] earlier last week.  A head CT was performed which was negative.  She had progressively worsening generalized weakness without focal deficit.  Left flank pain.  Denies hematuria.  Constant duration.  This is a recurring problem for her over the last week.  No relieving factors.  No specific exacerbating factors.  No radiation of pain.    In the emergency department routine laboratory studies and urinalysis were obtained.  She had evidence of urinary tract infection and sepsis.  A CT of the abdomen and pelvis were performed which was concerning for possible perinephric abscess.  Urology has been consulted.  Cultures were obtained of blood in urine and appeared antibiotics were initiated.  US kidney showed no sign of abscess,patient has GNR on urine culture.nogrowth in blood culture.  Patient has episode of tachycardia yesterday,which resolved with labetalol,has been reported over night patient had cyanotic episode,which resolved with supportive care,Zosyn has been added to allergy list,started on doxycycline,will also do Echo.she is sleeping comfortably at this time.    Interval History: patient is sleeping very comfortable.    Review of Systems   Constitutional: Positive for activity change and appetite change.   HENT: Negative for congestion and dental problem.    Eyes: Negative for discharge and itching.   Respiratory: Negative for apnea and chest tightness.    Cardiovascular: Negative for chest pain and leg swelling.   Gastrointestinal: Negative for abdominal distention and abdominal pain.   Endocrine: Negative for heat intolerance.   Genitourinary: Negative for difficulty urinating and dyspareunia.   Musculoskeletal: Positive for myalgias.   Skin: Negative for pallor.   Allergic/Immunologic: Negative for environmental allergies and food allergies.   Neurological: Negative for dizziness and facial " asymmetry.   Hematological: Negative for adenopathy. Does not bruise/bleed easily.     Objective:     Vital Signs (Most Recent):  Temp: 97.8 °F (36.6 °C) (11/29/17 0411)  Pulse: (!) 113 (11/29/17 0411)  Resp: 18 (11/29/17 0411)  BP: (!) 111/56 (11/29/17 0411)  SpO2: 98 % (11/29/17 0411) Vital Signs (24h Range):  Temp:  [97.5 °F (36.4 °C)-99 °F (37.2 °C)] 97.8 °F (36.6 °C)  Pulse:  [] 113  Resp:  [18-28] 18  SpO2:  [93 %-99 %] 98 %  BP: (102-219)/(53-95) 111/56     Weight: 77.1 kg (170 lb)  Body mass index is 27.44 kg/m².    Intake/Output Summary (Last 24 hours) at 11/29/17 0645  Last data filed at 11/28/17 1400   Gross per 24 hour   Intake              240 ml   Output                0 ml   Net              240 ml      Physical Exam   Constitutional: She is oriented to person, place, and time. No distress.   HENT:   Head: Atraumatic.   Eyes: EOM are normal. Pupils are equal, round, and reactive to light.   Neck: Normal range of motion. Neck supple.   Cardiovascular: Regular rhythm.    Pulmonary/Chest: Breath sounds normal.   Abdominal: Soft. Bowel sounds are normal.   Musculoskeletal: Normal range of motion.   Neurological: She is oriented to person, place, and time. No cranial nerve deficit.   Skin: She is not diaphoretic.   Psychiatric: She has a normal mood and affect. Her behavior is normal.       Significant Labs:   BMP:   Recent Labs  Lab 11/29/17 0441   *      K 2.9*      CO2 25   BUN 16   CREATININE 1.2   CALCIUM 7.9*   MG 1.5*     CBC:   Recent Labs  Lab 11/27/17 2009 11/28/17  0544 11/29/17 0441   WBC 19.15* 22.86* 13.85*   HGB 12.4 9.5* 8.9*   HCT 36.9* 29.5* 27.4*    334 256       Significant Imaging: reviewed    Assessment/Plan:      * Sepsis secondary to UTI    · As evidenced by history, physical exam, UA, and CT imaging  · CT shows concern for possible perinephric abscess.  Urology consulted.  · 2 out of 4 SIRS criteria.  · Urine culture and Gram stain obtained prior  to antibiotics  · Given empiric antibiotics  · Will tailor antibiotic regimen according to culture & sensitivity results  · Maintain euvolemic status with IV fluids  · Pending neurology consult          Sinus tachycardia    May duo to sepsis,alaso may had allergy reaction to Zosyn,Abx changed to Doxycycline,will check also Echo,lower leg DVT study.          UTI (urinary tract infection)    · GNR on culture,CT findings are consistent with probable perinephric abscess in the setting of sepsis secondary to pyelonephritis. But kidney Us showed no sign of abscess.  · Will continue with IV Abx            Thyroid disease    · has normal free T 4,on synthroid.          Essential hypertension    Holding home antihypertensive regimen in the setting of sepsis.  Beta blockers are contraindicated.          Neutrophilic leukocytosis    Secondary to sepsis associated with urinary tract infection as noted above.  Trending wbc's.          Pyelonephritis    Acute ,Management of pyelonephritis as noted above.            VTE Risk Mitigation         Ordered     Place JUAN hose  Until discontinued      11/28/17 7519     Medium Risk of VTE  Once      11/28/17 1794              Tamiko Mendosa MD  Department of Hospital Medicine   [facility]

## 2017-11-29 NOTE — PT/OT/SLP EVAL
Occupational Therapy  Evaluation    Fabienne Goncalves   MRN: [id number]   Admitting Diagnosis: Sepsis secondary to UTI    OT Date of Treatment: 11/29/17   OT Start Time: 1045  OT Stop Time: 1112  OT Total Time (min): 27 min    Billable Minutes:  Evaluation 19  Self Care/Home Management 8    Diagnosis: Sepsis secondary to UTI       Past Medical History:   Diagnosis Date    Arthritis     Hypertension     Thyroid disease       Past Surgical History:   Procedure Laterality Date    HAND SURGERY      THYROIDECTOMY         Referring physician: Venu  Date referred to OT: 11/28/17    General Precautions: Standard,    Orthopedic Precautions: N/A  Braces: N/A          Patient History:  Living Environment  Lives With: spouse  Living Arrangements: house  Home Layout: Able to live on 1st floor  Transportation Available: car, family or friend will provide  Living Environment Comment: Pt works full time but has worked less the last 6 wks as per . Pt does all household chores including cooking and cleaning  Equipment Currently Used at Home: cane, quad    Prior level of function:   Bed Mobility/Transfers: independent  Grooming: independent  Bathing: independent  Upper Body Dressing: independent  Lower Body Dressing: independent  Toileting: independent  Home Management Skills: independent  Homemaking Responsibilities: Yes  Driving License: Yes  Mode of Transportation: Car  Occupation: Full time employment     Dominant hand: right    Subjective:  Communicated with nurse Jack prior to session.    Chief Complaint: SOB  Patient/Family stated goals: to feel better. I don't feel like myself    Pain/Comfort  Pain Rating 1: 4/10  Location 1:  (headache)  Pain Addressed 1: Distraction, Cessation of Activity, Nurse notified  Pain Rating Post-Intervention 1: 4/10    Objective:  Patient found with: peripheral IV, telemetry    Cognitive Exam:  Oriented to: Person, Place, Time and Situation  Follows Commands/attention: Follows one-step  commands  Communication: clear/fluent  Memory:  No Deficits noted  Safety awareness/insight to disability: intact  Coping skills/emotional control: Appropriate to situation    Visual/perceptual:  Intact    Physical Exam:  Postural examination/scapula alignment: No postural abnormalities identified  Skin integrity: Visible skin intact  Edema: None noted     Sensation:   Intact    Upper Extremity Range of Motion:  Right Upper Extremity: WFL  Left Upper Extremity: WFL    Upper Extremity Strength:  Right Upper Extremity: WFL  Left Upper Extremity: WFL   Strength: good    Fine motor coordination:   Intact    Gross motor coordination: WFL    Functional Mobility:  Bed Mobility:  Rolling/Turning to Left: Supervision  Rolling/Turning Right: Supervision  Scooting/Bridging: Supervision  Supine to Sit: Supervision    Transfers:  Sit <> Stand Assistance: Contact Guard Assistance  Sit <> Stand Assistive Device: No Assistive Device    Functional Ambulation: Pt able to ambulate short distance SOB noted    Activities of Daily Living:  Feeding Level of Assistance: Modified independent  UE Dressing Level of Assistance: Contact guard      Balance:   Static Sit: GOOD+: Takes MAXIMAL challenges from all directions.    Dynamic Sit: GOOD+: Maintains balance through MAXIMAL excursions of active trunk motion  Static Stand: FAIR: Maintains without assist but unable to take challenges  Dynamic stand: FAIR: Needs CONTACT GUARD during gait      AM-PAC 6 CLICK ADL  How much help from another person does this patient currently need?  1 = Unable, Total/Dependent Assistance  2 = A lot, Maximum/Moderate Assistance  3 = A little, Minimum/Contact Guard/Supervision  4 = None, Modified [place]/Independent    Putting on and taking off regular lower body clothing? : 3  Bathing (including washing, rinsing, drying)?: 2  Toileting, which includes using toilet, bedpan, or urinal? : 3  Putting on and taking off regular upper body clothing?: 4  Taking  "care of personal grooming such as brushing teeth?: 4  Eating meals?: 4  Total Score: 20    AM-PAC Raw Score CMS "G-Code Modifier Level of Impairment Assistance   6 % Total / Unable   7 - 9 CM 80 - 100% Maximal Assist   10-14 CL 60 - 80% Moderate Assist   15 - 19 CK 40 - 60% Moderate Assist   20 - 22 CJ 20 - 40% Minimal Assist   23 CI 1-20% SBA / CGA   24 CH 0% Independent/ Mod I       Patient left up in chair with all lines intact, call button in reach and  present    Assessment:  Fabienne Goncalves is a 72 y.o. female with a medical diagnosis of Sepsis secondary to UTI and presents with good family support. Pt is SOB with minimal activity. Pt would benefit from OBB activity to inprove    Rehab identified problem list/impairments: Rehab identified problem list/impairments: weakness, impaired endurance, impaired self care skills, impaired balance, pain    Rehab potential is good.    Activity tolerance: Good    Discharge recommendations: Discharge Facility/Level Of Care Needs: home health OT     Barriers to discharge: Barriers to Discharge: None    Equipment recommendations:  (to be determined)     GOALS:    Occupational Therapy Goals        Problem: Occupational Therapy Goal    Goal Priority Disciplines Outcome Interventions   Occupational Therapy Goal     OT, PT/OT Ongoing (interventions implemented as appropriate)    Description:  Goals to be met by: 12.6.17     Patient will increase functional independence with ADLs by performing:      UE Dressing with Modified [place].  LE Dressing with Set-up Assistance.  Grooming while standing with Set-up Assistance.  Toileting from toilet with Set-up Assistance for hygiene and clothing management.   Toilet transfer to toilet with Supervision.  Upper extremity exercise program x20 reps per handout, with independence.                    PLAN:  Patient to be seen 3 x/week to address the above listed problems via self-care/home management, therapeutic activities, " therapeutic exercises  Plan of Care expires: 12/06/17  Plan of Care reviewed with: patient, spouse         Mónica Mcknight, OT  11/29/2017

## 2017-11-29 NOTE — PROGRESS NOTES
[facility]  Urology  Progress Note    Patient Name: Fabienne Goncalves  MRN: [id number]  Admission Date: 11/27/2017  Hospital Length of Stay: 2 days  Code Status: Full Code   Attending Provider: Tamiko Mendosa MD   Primary Care Physician: Primary Doctor No    Subjective:     HPI:  This is a 72 year old woman who presented with 3 weeks of headache and shortness of breath.   She denies flank pain, hematuria, dysuria, or previous UTI.  She is unaware of any issues related to her kidney.   She is not sure if she has cysts on her kidney.    Interval History: Feeling better.    Review of Systems   Constitutional: Negative.    HENT: Negative.    Eyes: Negative.    Respiratory: Negative for cough, chest tightness and shortness of breath.    Cardiovascular: Negative for chest pain.   Gastrointestinal: Negative.  Negative for constipation, diarrhea and nausea.   Musculoskeletal: Negative.    Neurological: Negative.    Psychiatric/Behavioral: Negative.      Objective:     Temp:  [97.5 °F (36.4 °C)-99 °F (37.2 °C)] 98.3 °F (36.8 °C)  Pulse:  [] 98  Resp:  [18-28] 18  SpO2:  [93 %-99 %] 96 %  BP: (102-219)/(53-95) 117/56     Body mass index is 27.44 kg/m².            Drains          No matching active lines, drains, or airways          Physical Exam   Nursing note and vitals reviewed.  Constitutional: She is oriented to person, place, and time. She appears well-developed.   HENT:   Head: Normocephalic.   Eyes: Conjunctivae are normal.   Neck: Normal range of motion. No tracheal deviation present. No thyromegaly present.   Cardiovascular: Normal rate, normal heart sounds and normal pulses.    Pulmonary/Chest: Effort normal and breath sounds normal. No respiratory distress. She has no wheezes.   Abdominal: Soft. She exhibits no distension and no mass. There is no hepatosplenomegaly. There is no tenderness. There is no rebound, no guarding and no CVA tenderness. No hernia.   Musculoskeletal: Normal range  of motion. She exhibits no edema or tenderness.   Lymphadenopathy:     She has no cervical adenopathy.   Neurological: She is alert and oriented to person, place, and time.   Skin: Skin is warm and dry. No rash noted. No erythema.     Psychiatric: She has a normal mood and affect. Her behavior is normal. Judgment and thought content normal.       Significant Labs:    BMP:    Recent Labs  Lab 11/27/17 2009 11/28/17 0544 11/29/17  0441   * 138 136   K 2.9* 3.5 2.9*   CL 92* 102 103   CO2 30* 28 25   BUN 23 20 16   CREATININE 1.6* 1.3 1.2   CALCIUM 9.6 8.3* 7.9*       CBC:     Recent Labs  Lab 11/27/17 2009 11/28/17 0544 11/29/17  0441   WBC 19.15* 22.86* 13.85*   HGB 12.4 9.5* 8.9*   HCT 36.9* 29.5* 27.4*    334 256       Blood Culture:   Recent Labs  Lab 11/27/17  2320 11/27/17  2325   LABBLOO No Growth to date  No Growth to date No Growth to date  No Growth to date     Urine Culture:   Recent Labs  Lab 11/27/17  2131   LABURIN GRAM NEGATIVE BON>100,000 cfu/mlIdentification and susceptibility pending       Significant Imaging:  U/S: I have reviewed all results within the past 24 hours and my personal findings are:  Left kidney with cyst, not abscess                  Assessment/Plan:     UTI (urinary tract infection)    Follow culture  Does not appear to be abscess        Pyelonephritis    Follow culture  Treat x 2 weeks  Will sign off, call with further concerns  Follow up in 2-3 weeks            VTE Risk Mitigation         Ordered     Place JUAN hose  Until discontinued      11/28/17 0518     Medium Risk of VTE  Once      11/28/17 0215          ASHLEY Moyer MD  Urology  [facility]

## 2017-11-29 NOTE — SIGNIFICANT EVENT
Hospital Medicine  Cross-Cover Note        Diagnosis leading to hospitalization: Sepsis secondary to UTI    Length of Stay since admission: 2     HPI - Interval History:       Called by nursing staff for cyanotic patient.   Started receiving Zosyn just prior to reaction.  Became hypertensive, hypoxic, and tachycardic.    Thought to be new penicillin allergy.  Had a milder episode earlier. Was given labetalol earlier today for milder, but similar episode.    Vitals:  Vitals:    11/28/17 2000 11/28/17 2007 11/28/17 2312 11/29/17 0110   BP:  (!) 108/59 (!) 105/59    BP Location:  Right arm Left arm    Patient Position:  Lying Lying    Pulse: 101 101 93 (!) 122   Resp:  18 18 20   Temp:  98.1 °F (36.7 °C) 97.6 °F (36.4 °C)    TempSrc:  Oral Oral    SpO2:  95% 95% (!) 94%   Weight:       Height:           Current Medications:  Scheduled Meds:   diphenhydrAMINE  50 mg Intravenous Once    DULoxetine  30 mg Oral Daily    famotidine (PF)  20 mg Intravenous Once    labetalol  10 mg Intravenous Once    levalbuterol  1.25 mg Nebulization Q8H    levothyroxine  88 mcg Oral Daily    pantoprazole  40 mg Oral Daily    piperacillin-tazobactam (ZOSYN) IVPB  4.5 g Intravenous Q8H    rosuvastatin  10 mg Oral QHS    senna-docusate 8.6-50 mg  1 tablet Oral BID     Continuous Infusions:   0/9% NACL & POTASSIUM CHLORIDE 20 MEQ/L 75 mL/hr at 11/28/17 1600     PRN Meds:.acetaminophen, EPINEPHrine, influenza, labetalol, ondansetron, pneumoc 13-chandu conj-dip cr(PF)    Lab Results:     CBC:    Recent Labs  Lab 11/27/17 2009 11/28/17  0544   WBC 19.15* 22.86*   HGB 12.4 9.5*    334   MCV 84 87   MCH 28.4 27.9   MCHC 33.6 32.2   RBC 4.37 3.40*       CMP:    Recent Labs  Lab 11/27/17 2009 11/28/17  0544   * 138   K 2.9* 3.5   CL 92* 102   CO2 30* 28   BUN 23 20   CREATININE 1.6* 1.3   CALCIUM 9.6 8.3*   MG 1.8  --    PROT 7.6  --    BILITOT 0.6  --    ALKPHOS 222*  --    ALT 49*  --    AST 28  --    LIPASE 44  46  --       Coagulantion studies    Recent Labs  Lab 11/28/17  0544   INR 1.2     Cardiac Enzymes    Recent Labs  Lab 11/27/17 2009   CPK 76   TROPONINI 0.020       Recent Labs  Lab 11/27/17  2325   LACTATE 0.8         Consults:  IP CONSULT TO UROLOGY     Assessment and Plan:    Active Hospital Problems    Diagnosis  POA    *Sepsis secondary to UTI [A41.9, N39.0]  Yes     Priority: 1 - High    Pyelonephritis [N12]  Yes     Priority: 2     Perinephric abscess [N15.1]  Yes     Priority: 3     Neutrophilic leukocytosis [D72.9]  Yes    Hypertension [I10]  Yes    Thyroid disease [E07.9]  Yes      Resolved Hospital Problems    Diagnosis Date Resolved POA   No resolved problems to display.       Labs were reviewed.  Imaging was reviewed.  Problem listed reviewed and updated where needed.    Additional orders placed:    1. Epinephrine PRN  2. Famotidine  3. Benadryl  4. Xopenex  5. Continuous pulse ox  6. Updated allergy list to include Penicillins     Daren Montenegro MD, MPH  Hospital Medicine   Pager: [phone]

## 2017-11-29 NOTE — NURSING
Patient transported to cardiology.  Telemetry monitor notified.  Patient transported with oxygen, no apparent distress, pain or SOB.

## 2017-11-29 NOTE — PLAN OF CARE
"Problem: Patient Care Overview  Goal: Plan of Care Review  Outcome: Ongoing (interventions implemented as appropriate)   11/29/17 4287   Coping/Psychosocial   Plan Of Care Reviewed With patient   A+Ox4. Pt free from falls, injury, and trauma. Scheduled meds given. Fluids continued as ordered. @ about 0120 pt stated, " I feel very cold". Pt was shivering uncontrollably, Bp was 200/110, and O2 saturation was 86. Then a rapid response was called.  The ICU charge nurse and Dr. Montenegro responded. Pt was getting zosyn which was stopped immediately.  ordered 10 mg labetalol, 50mg benadryl, 20 mg famotidine, and a breathing treatment. Also pt was put on a 100% non-re breather during the event. The pts vitals started coming back to normal and now the pt is resting comfortably. Will continue to monitor.      "

## 2017-11-29 NOTE — PLAN OF CARE
Problem: Occupational Therapy Goal  Goal: Occupational Therapy Goal  Goals to be met by: 12.6.17     Patient will increase functional independence with ADLs by performing:      UE Dressing with Modified [place].  LE Dressing with Set-up Assistance.  Grooming while standing with Set-up Assistance.  Toileting from toilet with Set-up Assistance for hygiene and clothing management.   Toilet transfer to toilet with Supervision.  Upper extremity exercise program x20 reps per handout, with independence.  Outcome: Ongoing (interventions implemented as appropriate)  OT completed evaluation with home health OT recommended.

## 2017-11-29 NOTE — ASSESSMENT & PLAN NOTE
May duo to sepsis,[place] may had allergy reaction to Zosyn,Abx changed to Doxycycline,will check also Echo,lower leg DVT study.

## 2017-11-29 NOTE — HOSPITAL COURSE
"Fabienne Goncalves is a 72 y.o. female that (in part)  has a past medical history of Arthritis; Hypertension; and Thyroid disease. Presents to [facility] complaining of nausea and abdominal pain.  Decreased appetite.  No bowel movement for 4 days.  Lethargic.  Short of breath with exertion.   reports she is not "acting herself".  She was evaluated for similar symptoms at [facility] earlier last week.  A head CT was performed which was negative.  She had progressively worsening generalized weakness without focal deficit.  Left flank pain.  Denies hematuria.  Constant duration.  This is a recurring problem for her over the last week.  No relieving factors.  No specific exacerbating factors.  No radiation of pain.  In the emergency department routine laboratory studies and urinalysis were obtained.  She had evidence of urinary tract infection and sepsis.  A CT of the abdomen and pelvis were performed which was concerning for possible perinephric abscess.  Urology has been consulted.  Cultures were obtained of blood in urine and IV  antibiotics were initiated.  US kidney showed no sign of abscess,so Abscess has been ruled out,..patient has Ecoli  on urine culture.  Patient has episode of tachycardia on 11.28,17 ,which resolved with labetalol,has been reported over night patient had cyanotic episode,which resolved with supportive care,Zosyn has been added to allergy list,she denies history of allergy to PEN. She say she was on PEN before.started on Ertanepem,ID did not think patient is allergic to PEN,so Rocephin has been started and patient had no allergic reaction,So PEN has been removed from allergic list.  she has also positive blood culture for Ecoli,source UTI,repeated blood culture,consulted ID for Abx rec.  Has normal TSH and Echo showed preserved EF with diastolic CHF.  Repeat blood culture was negative,she did well with PT,OT and was feeling " much better.  Patient has been discharged home with PO Cefuroxime for 2 weeks and follow up with PCP in next few days.

## 2017-11-29 NOTE — PT/OT/SLP EVAL
Physical Therapy  Evaluation    Fabienne Goncalves   MRN: [id number]   Admitting Diagnosis: Sepsis secondary to UTI    PT Received On: 11/29/17  PT Start Time: 1452     PT Stop Time: 1511    PT Total Time (min): 19 min       Billable Minutes:  Evaluation 19    Diagnosis: Sepsis secondary to UTI    Past Medical History:   Diagnosis Date    Arthritis     Hypertension     Thyroid disease       Past Surgical History:   Procedure Laterality Date    HAND SURGERY      THYROIDECTOMY         Referring physician: Tamiko Mendosa  Date referred to PT: 11/28/17    General Precautions: Standard, fall, respiratory  Orthopedic Precautions: N/A   Braces: N/A       Do you have any cultural, spiritual, Tenriism conflicts, given your current situation?: None    Patient History:  Lives With: spouse ( works during day)  Living Arrangements: house  Home Layout: Able to live on 1st floor  Transportation Available: family or friend will provide  Living Environment Comment: Patient has no complaints or concerns with ambulation around the house.  Equipment Currently Used at Home: cane, quad (Patient said she uses when needed but recently has not been using much)  DME owned (not currently used): none    Previous Level of Function:  Ambulation Skills: independent  Transfer Skills: independent  ADL Skills: independent  Work/Leisure Activity: independent    Subjective:  Communicated with nurse Jack prior to session.  Patient agreeable to PT evaluation.  Chief Complaint: Generalized discomfort from arthritis.  Patient goals: To go home.    Pain/Comfort  Pain Rating 1: other (see comments) (Patient has general aching throughout body due to arthritis. Said her R knee was most discomfortable but did not give a pain rating.)  Pain Addressed 1: Reposition, Cessation of Activity  Pain Rating Post-Intervention 1: other (see comments) (No changes in discomfort with ambulation or after evaluation)      Objective:   Patient found with: 1L  oxygen, peripheral IV, telemetry     Cognitive Exam:  Oriented to: Person, Place, Time and Situation    Follows Commands/attention: Follows multistep  commands  Communication: clear/fluent  Safety awareness/insight to disability: intact    Physical Exam:  Postural examination/scapula alignment: No postural abnormalities identified    Skin integrity: Visible skin intact to bilateral LE.  Edema: None noted to bilateral LE.    Sensation:   Intact    Lower Extremity Range of Motion:  Right Lower Extremity: WFL  Left Lower Extremity: WFL    Lower Extremity Strength:  Right Lower Extremity:  Hip Flexion: 4-/5  Knee Flexion: 4/5  Knee Extension: 3+5  Dorsiflexion: 4/5  Plantarflexion: 4/5     Left Lower Extremity:   Hip Flexion: 4-/5  Knee Flexion: 4/5  Knee Extension: 3+5  Dorsiflexion: 4/5  Plantarflexion: 4/5     Fine motor coordination:  Intact    Gross motor coordination: WFL    Functional Mobility:  Bed Mobility:  Sit to Supine: Supervision    Transfers:  Sit <> Stand Assistance: Supervision  Sit <> Stand Assistive Device: No Assistive Device    Gait:   Gait Distance: Patient ambulated ~250ft on room air with minor complaints of SOB towards end of ambulation. Patient was instructed on pursed lip breathing. After ambulation her O2 stats was at 94% and natasha to 96% with 1min of rest. Patient was placed back on 1L of O2 with nasal canula. Patient is safe to ambulate in hallways with nursing staff assistance.  Assistance 1: Stand by Assistance  Gait Assistive Device: No device  Gait Pattern: swing-through gait  Gait Deviation(s): decreased velocity of limb motion, decreased stride length    Balance:   Static Sit: GOOD+: Takes MAXIMAL challenges from all directions.    Dynamic Sit: GOOD: Maintains balance through MODERATE excursions of active trunk movement  Static Stand: GOOD: Takes MODERATE challenges from all directions  Dynamic stand: FAIR: Needs CONTACT GUARD during gait    Therapeutic Activities and  Exercises:  Patient performed seated bilateral LE therex: x20 ankle pumps/heel raises, x15 LAQ, x15 hip flex, x15 hip abd/add. Patient was given verbal cues for pursed lip breathing.    AM-PAC 6 CLICK MOBILITY  How much help from another person does this patient currently need?   1 = Unable, Total/Dependent Assistance  2 = A lot, Maximum/Moderate Assistance  3 = A little, Minimum/Contact Guard/Supervision  4 = None, Modified [place]/Independent    Turning over in bed (including adjusting bedclothes, sheets and blankets)?: 4  Sitting down on and standing up from a chair with arms (e.g., wheelchair, bedside commode, etc.): 4  Moving from lying on back to sitting on the side of the bed?: 4  Moving to and from a bed to a chair (including a wheelchair)?: 4  Need to walk in hospital room?: 4  Climbing 3-5 steps with a railing?: 3  Total Score: 23     AM-PAC Raw Score CMS G-Code Modifier Level of Impairment Assistance   6 % Total / Unable   7 - 9 CM 80 - 100% Maximal Assist   10 - 14 CL 60 - 80% Moderate Assist   15 - 19 CK 40 - 60% Moderate Assist   20 - 22 CJ 20 - 40% Minimal Assist   23 CI 1-20% SBA / CGA   24 CH 0% Independent/ Mod I     Patient left up in chair with all lines intact, call button in reach and nurse Marcie notified.    Assessment:   Fabienne Goncalves is a 72 y.o. female with a medical diagnosis of Sepsis secondary to UTI and presents with generalized weakness and impaired endurance. Patient was able to ambulate ~250ft on room air with minor complains of SOB towards end of gait assessment. Her O2 stats were at 94% which natasha to 96% with rest and pursed lip breathing. Patient will benefit from skilled PT to increase strength and endurance to return to PLOF.     Rehab identified problem list/impairments: Rehab identified problem list/impairments: weakness, impaired endurance, impaired cardiopulmonary response to activity    Rehab potential is good.    Activity tolerance: Good    Discharge  recommendations: Discharge Facility/Level Of Care Needs: outpatient PT     Barriers to discharge: Barriers to Discharge: None    Equipment recommendations: Equipment Needed After Discharge: none     GOALS:    Physical Therapy Goals        Problem: Physical Therapy Goal    Goal Priority Disciplines Outcome Goal Variances Interventions   Physical Therapy Goal     PT/OT, PT      Description:  Goals to be met by: 17     Patient will increase functional independence with mobility by performin. Sit to stand transfer with [place].  2. Bed to chair transfer with [place].  3. Gait  x 1000 feet with [place] on room air.   4. Lower extremity exercise program x30 reps per handout, with independence.                       PLAN:    Patient to be seen 5 x/week ((M-F)) to address the above listed problems via gait training, therapeutic activities, therapeutic exercises  Plan of Care expires: 17  Plan of Care reviewed with: patient          Molly Masesy, SPT  2017

## 2017-11-29 NOTE — ASSESSMENT & PLAN NOTE
Follow culture  Treat x 2 weeks  Will sign off, call with further concerns  Follow up in 2-3 weeks

## 2017-11-29 NOTE — PT/OT/SLP PROGRESS
Physical Therapy      Fabienne Goncalves  MRN: [id number]    Patient not seen at this time for PT eval secondary to Toileting. Pt currently in the bathroom, family present requested PT to come back in ~30 min. Will follow-up later.    Maryuri Lugo, PT

## 2017-11-30 PROBLEM — R78.81 BACTEREMIA DUE TO ESCHERICHIA COLI: Status: ACTIVE | Noted: 2017-11-30

## 2017-11-30 PROBLEM — N10 PYELONEPHRITIS, ACUTE: Status: ACTIVE | Noted: 2017-11-27

## 2017-11-30 PROBLEM — B96.20 BACTEREMIA DUE TO ESCHERICHIA COLI: Status: ACTIVE | Noted: 2017-11-30

## 2017-11-30 LAB
ANION GAP SERPL CALC-SCNC: 7 MMOL/L
BASOPHILS # BLD AUTO: 0.01 K/UL
BASOPHILS NFR BLD: 0.1 %
BUN SERPL-MCNC: 11 MG/DL
CALCIUM SERPL-MCNC: 7.9 MG/DL
CHLORIDE SERPL-SCNC: 108 MMOL/L
CO2 SERPL-SCNC: 26 MMOL/L
CREAT SERPL-MCNC: 0.9 MG/DL
DIFFERENTIAL METHOD: ABNORMAL
EOSINOPHIL # BLD AUTO: 0.1 K/UL
EOSINOPHIL NFR BLD: 1.2 %
ERYTHROCYTE [DISTWIDTH] IN BLOOD BY AUTOMATED COUNT: 16 %
EST. GFR  (AFRICAN AMERICAN): >60 ML/MIN/1.73 M^2
EST. GFR  (NON AFRICAN AMERICAN): >60 ML/MIN/1.73 M^2
GLUCOSE SERPL-MCNC: 104 MG/DL
HCT VFR BLD AUTO: 28.3 %
HGB BLD-MCNC: 9.3 G/DL
LYMPHOCYTES # BLD AUTO: 0.5 K/UL
LYMPHOCYTES NFR BLD: 5.1 %
MCH RBC QN AUTO: 28.2 PG
MCHC RBC AUTO-ENTMCNC: 32.9 G/DL
MCV RBC AUTO: 86 FL
MONOCYTES # BLD AUTO: 0.4 K/UL
MONOCYTES NFR BLD: 3.6 %
NEUTROPHILS # BLD AUTO: 8.7 K/UL
NEUTROPHILS NFR BLD: 90 %
PLATELET # BLD AUTO: 299 K/UL
PMV BLD AUTO: 9.6 FL
POCT GLUCOSE: 102 MG/DL (ref 70–110)
POTASSIUM SERPL-SCNC: 3.4 MMOL/L
RBC # BLD AUTO: 3.3 M/UL
SODIUM SERPL-SCNC: 141 MMOL/L
WBC # BLD AUTO: 9.62 K/UL

## 2017-11-30 PROCEDURE — 36415 COLL VENOUS BLD VENIPUNCTURE: CPT

## 2017-11-30 PROCEDURE — 80048 BASIC METABOLIC PNL TOTAL CA: CPT

## 2017-11-30 PROCEDURE — 25000003 PHARM REV CODE 250: Performed by: EMERGENCY MEDICINE

## 2017-11-30 PROCEDURE — 87040 BLOOD CULTURE FOR BACTERIA: CPT

## 2017-11-30 PROCEDURE — 85025 COMPLETE CBC W/AUTO DIFF WBC: CPT

## 2017-11-30 PROCEDURE — 63600175 PHARM REV CODE 636 W HCPCS: Performed by: HOSPITALIST

## 2017-11-30 PROCEDURE — 94761 N-INVAS EAR/PLS OXIMETRY MLT: CPT

## 2017-11-30 PROCEDURE — 12000002 HC ACUTE/MED SURGE SEMI-PRIVATE ROOM

## 2017-11-30 PROCEDURE — 25000003 PHARM REV CODE 250: Performed by: HOSPITALIST

## 2017-11-30 RX ORDER — METOPROLOL TARTRATE 25 MG/1
25 TABLET, FILM COATED ORAL 2 TIMES DAILY
Status: DISCONTINUED | OUTPATIENT
Start: 2017-11-30 | End: 2017-12-02 | Stop reason: HOSPADM

## 2017-11-30 RX ORDER — TRAMADOL HYDROCHLORIDE 50 MG/1
50 TABLET ORAL EVERY 6 HOURS PRN
Status: DISCONTINUED | OUTPATIENT
Start: 2017-11-30 | End: 2017-12-02 | Stop reason: HOSPADM

## 2017-11-30 RX ORDER — LISINOPRIL 20 MG/1
20 TABLET ORAL DAILY
Status: DISCONTINUED | OUTPATIENT
Start: 2017-11-30 | End: 2017-12-02 | Stop reason: HOSPADM

## 2017-11-30 RX ORDER — FUROSEMIDE 40 MG/1
40 TABLET ORAL DAILY
Status: DISCONTINUED | OUTPATIENT
Start: 2017-11-30 | End: 2017-12-02 | Stop reason: HOSPADM

## 2017-11-30 RX ADMIN — LEVOTHYROXINE SODIUM 88 MCG: 88 TABLET ORAL at 04:11

## 2017-11-30 RX ADMIN — METOPROLOL TARTRATE 25 MG: 25 TABLET ORAL at 08:11

## 2017-11-30 RX ADMIN — PANTOPRAZOLE SODIUM 40 MG: 40 TABLET, DELAYED RELEASE ORAL at 08:11

## 2017-11-30 RX ADMIN — ONDANSETRON 8 MG: 2 INJECTION INTRAMUSCULAR; INTRAVENOUS at 08:11

## 2017-11-30 RX ADMIN — TRAMADOL HYDROCHLORIDE 50 MG: 50 TABLET, FILM COATED ORAL at 04:11

## 2017-11-30 RX ADMIN — LISINOPRIL 20 MG: 20 TABLET ORAL at 08:11

## 2017-11-30 RX ADMIN — SODIUM CHLORIDE AND POTASSIUM CHLORIDE: 9; 1.49 INJECTION, SOLUTION INTRAVENOUS at 04:11

## 2017-11-30 RX ADMIN — ACETAMINOPHEN 650 MG: 325 TABLET ORAL at 11:11

## 2017-11-30 RX ADMIN — POTASSIUM PHOSPHATE, MONOBASIC 500 MG: 500 TABLET, SOLUBLE ORAL at 08:11

## 2017-11-30 RX ADMIN — FUROSEMIDE 40 MG: 40 TABLET ORAL at 08:11

## 2017-11-30 RX ADMIN — MEROPENEM AND SODIUM CHLORIDE 500 MG: 500 INJECTION, SOLUTION INTRAVENOUS at 04:11

## 2017-11-30 RX ADMIN — ERTAPENEM SODIUM 1 G: 1 INJECTION, POWDER, LYOPHILIZED, FOR SOLUTION INTRAMUSCULAR; INTRAVENOUS at 11:11

## 2017-11-30 RX ADMIN — ACETAMINOPHEN 650 MG: 325 TABLET ORAL at 08:11

## 2017-11-30 RX ADMIN — ROSUVASTATIN CALCIUM 10 MG: 10 TABLET ORAL at 08:11

## 2017-11-30 RX ADMIN — DULOXETINE 30 MG: 30 CAPSULE, DELAYED RELEASE ORAL at 08:11

## 2017-11-30 NOTE — SUBJECTIVE & OBJECTIVE
Interval History: patient is afebrile,feel better.    Review of Systems   Constitutional: Positive for activity change. Negative for appetite change.   HENT: Negative for congestion and dental problem.    Eyes: Negative for discharge and itching.   Respiratory: Negative for apnea and chest tightness.    Cardiovascular: Negative for chest pain and leg swelling.   Gastrointestinal: Negative for abdominal distention and abdominal pain.   Endocrine: Negative for heat intolerance.   Genitourinary: Negative for difficulty urinating and dyspareunia.   Musculoskeletal: Positive for myalgias.   Skin: Negative for pallor.   Allergic/Immunologic: Negative for environmental allergies and food allergies.   Neurological: Negative for dizziness and facial asymmetry.   Hematological: Negative for adenopathy. Does not bruise/bleed easily.     Objective:     Vital Signs (Most Recent):  Temp: 97.7 °F (36.5 °C) (11/30/17 0727)  Pulse: 91 (11/30/17 0727)  Resp: 18 (11/30/17 0727)  BP: (!) 160/75 (11/30/17 0727)  SpO2: 95 % (11/30/17 0727) Vital Signs (24h Range):  Temp:  [97.6 °F (36.4 °C)-98.5 °F (36.9 °C)] 97.7 °F (36.5 °C)  Pulse:  [] 91  Resp:  [17-18] 18  SpO2:  [94 %-98 %] 95 %  BP: (101-160)/(52-75) 160/75     Weight: 77.1 kg (170 lb)  Body mass index is 27.44 kg/m².    Intake/Output Summary (Last 24 hours) at 11/30/17 0791  Last data filed at 11/30/17 0456   Gross per 24 hour   Intake           3267.5 ml   Output                0 ml   Net           3267.5 ml      Physical Exam   Constitutional: She is oriented to person, place, and time. No distress.   HENT:   Head: Atraumatic.   Eyes: EOM are normal. Pupils are equal, round, and reactive to light.   Neck: Normal range of motion. Neck supple.   Cardiovascular: Regular rhythm.    Pulmonary/Chest: Breath sounds normal.   Abdominal: Soft. Bowel sounds are normal.   Musculoskeletal: Normal range of motion.   Neurological: She is oriented to person, place, and time. No cranial  nerve deficit.   Skin: She is not diaphoretic.   Psychiatric: She has a normal mood and affect. Her behavior is normal.       Significant Labs:   BMP:     Recent Labs  Lab 11/29/17 0441 11/30/17 0459   * 104    141   K 2.9* 3.4*    108   CO2 25 26   BUN 16 11   CREATININE 1.2 0.9   CALCIUM 7.9* 7.9*   MG 1.5*  --      CBC:     Recent Labs  Lab 11/29/17 0441 11/30/17 0459   WBC 13.85* 9.62   HGB 8.9* 9.3*   HCT 27.4* 28.3*    299       Significant Imaging: reviewed

## 2017-11-30 NOTE — ASSESSMENT & PLAN NOTE
· As evidenced by history, physical exam, UA, and CT imaging  · CT shows concern for possible perinephric abscess.  Urology consulted.US show no abscess.  · 2 out of 4 SIRS criteria.  · Urine culture and Gram stain obtained prior to antibiotics  · Given empiric antibiotics  · Will tailor antibiotic regimen according to culture & sensitivity results  · Maintain euvolemic status with IV fluids  · On IV Abx,improving.

## 2017-11-30 NOTE — PLAN OF CARE
Problem: Patient Care Overview  Goal: Plan of Care Review  Outcome: Ongoing (interventions implemented as appropriate)  Pt up ad eyal to bathroom and complaint when I  Ask her to call for supervision.  Complains of feeling weak today. Refused to do therapy. Accuchecks continue to be monitored and are WNL.. BC repeated this morning. Pt is afebrile

## 2017-11-30 NOTE — PT/OT/SLP PROGRESS
"Physical Therapy      Fabienne Goncalves  MRN: [id number]    Patient not seen today secondary to Other (pt c/o nausea and fatigue. Pt stated " I am not able to do it today " . Will follow-up as able later hour/day.    Corina Blair PTA    "

## 2017-11-30 NOTE — PLAN OF CARE
Problem: Patient Care Overview  Goal: Plan of Care Review  Outcome: Ongoing (interventions implemented as appropriate)   11/30/17 0700   Coping/Psychosocial   Plan Of Care Reviewed With patient

## 2017-11-30 NOTE — ASSESSMENT & PLAN NOTE
· Ecoli  on culture,CT findings are consistent with probable perinephric abscess in the setting of sepsis secondary to pyelonephritis. But kidney Us showed no sign of abscess.  · Will continue with IV Abx

## 2017-11-30 NOTE — PLAN OF CARE
SW met with patient to explain IMM, patient verbalized understanding, copy provided.         11/30/17 1123   Medicare Message   Important Message from Medicare regarding Discharge Appeal Rights Given to patient/caregiver;Explained to patient/caregiver;Signed/date by patient/caregiver

## 2017-11-30 NOTE — ASSESSMENT & PLAN NOTE
Secondary to sepsis associated with urinary tract infection as noted above.  Trending wbc's.improved.

## 2017-11-30 NOTE — ASSESSMENT & PLAN NOTE
May duo to sepsis,alaso may had allergy reaction to Zosyn,Abx changed to ertanepem,no DVT on legs.

## 2017-11-30 NOTE — PT/OT/SLP PROGRESS
Occupational Therapy      Fabienne Goncalves  MRN: [id number]    Patient not seen today secondary to Other  (Patient not willing to participate at this time due to c/o headache, fatigue, and nausea. Patient's nurse notified). Will follow-up as able.    ALEX Burroughs  11/30/2017

## 2017-12-01 LAB
ANION GAP SERPL CALC-SCNC: 9 MMOL/L
BACTERIA BLD CULT: NORMAL
BASOPHILS # BLD AUTO: 0.02 K/UL
BASOPHILS NFR BLD: 0.4 %
BUN SERPL-MCNC: 9 MG/DL
CALCIUM SERPL-MCNC: 8.4 MG/DL
CHLORIDE SERPL-SCNC: 103 MMOL/L
CO2 SERPL-SCNC: 27 MMOL/L
CREAT SERPL-MCNC: 0.9 MG/DL
DIFFERENTIAL METHOD: ABNORMAL
EOSINOPHIL # BLD AUTO: 0.1 K/UL
EOSINOPHIL NFR BLD: 2.5 %
ERYTHROCYTE [DISTWIDTH] IN BLOOD BY AUTOMATED COUNT: 15.4 %
EST. GFR  (AFRICAN AMERICAN): >60 ML/MIN/1.73 M^2
EST. GFR  (NON AFRICAN AMERICAN): >60 ML/MIN/1.73 M^2
GLUCOSE SERPL-MCNC: 80 MG/DL
HCT VFR BLD AUTO: 28.6 %
HGB BLD-MCNC: 9 G/DL
LYMPHOCYTES # BLD AUTO: 0.7 K/UL
LYMPHOCYTES NFR BLD: 12.6 %
MCH RBC QN AUTO: 27.3 PG
MCHC RBC AUTO-ENTMCNC: 31.5 G/DL
MCV RBC AUTO: 87 FL
MONOCYTES # BLD AUTO: 0.4 K/UL
MONOCYTES NFR BLD: 7.2 %
NEUTROPHILS # BLD AUTO: 4.1 K/UL
NEUTROPHILS NFR BLD: 77.1 %
PLATELET # BLD AUTO: 398 K/UL
PMV BLD AUTO: 9.8 FL
POTASSIUM SERPL-SCNC: 2.9 MMOL/L
RBC # BLD AUTO: 3.3 M/UL
SODIUM SERPL-SCNC: 139 MMOL/L
WBC # BLD AUTO: 5.3 K/UL

## 2017-12-01 PROCEDURE — 63600175 PHARM REV CODE 636 W HCPCS: Performed by: HOSPITALIST

## 2017-12-01 PROCEDURE — 36415 COLL VENOUS BLD VENIPUNCTURE: CPT

## 2017-12-01 PROCEDURE — 94761 N-INVAS EAR/PLS OXIMETRY MLT: CPT

## 2017-12-01 PROCEDURE — 80048 BASIC METABOLIC PNL TOTAL CA: CPT

## 2017-12-01 PROCEDURE — 63600175 PHARM REV CODE 636 W HCPCS: Performed by: INTERNAL MEDICINE

## 2017-12-01 PROCEDURE — 85025 COMPLETE CBC W/AUTO DIFF WBC: CPT

## 2017-12-01 PROCEDURE — 25000003 PHARM REV CODE 250: Performed by: EMERGENCY MEDICINE

## 2017-12-01 PROCEDURE — 97535 SELF CARE MNGMENT TRAINING: CPT

## 2017-12-01 PROCEDURE — 12000002 HC ACUTE/MED SURGE SEMI-PRIVATE ROOM

## 2017-12-01 PROCEDURE — 25000003 PHARM REV CODE 250: Performed by: HOSPITALIST

## 2017-12-01 RX ORDER — POTASSIUM CHLORIDE 750 MG/1
50 TABLET, EXTENDED RELEASE ORAL ONCE
Status: COMPLETED | OUTPATIENT
Start: 2017-12-01 | End: 2017-12-01

## 2017-12-01 RX ADMIN — POTASSIUM PHOSPHATE, MONOBASIC 500 MG: 500 TABLET, SOLUBLE ORAL at 08:12

## 2017-12-01 RX ADMIN — ERTAPENEM SODIUM 1 G: 1 INJECTION, POWDER, LYOPHILIZED, FOR SOLUTION INTRAMUSCULAR; INTRAVENOUS at 09:12

## 2017-12-01 RX ADMIN — PANTOPRAZOLE SODIUM 40 MG: 40 TABLET, DELAYED RELEASE ORAL at 08:12

## 2017-12-01 RX ADMIN — FUROSEMIDE 40 MG: 40 TABLET ORAL at 08:12

## 2017-12-01 RX ADMIN — METOPROLOL TARTRATE 25 MG: 25 TABLET ORAL at 08:12

## 2017-12-01 RX ADMIN — TRAMADOL HYDROCHLORIDE 50 MG: 50 TABLET, FILM COATED ORAL at 06:12

## 2017-12-01 RX ADMIN — TRAMADOL HYDROCHLORIDE 50 MG: 50 TABLET, FILM COATED ORAL at 04:12

## 2017-12-01 RX ADMIN — POTASSIUM CHLORIDE 50 MEQ: 750 TABLET, EXTENDED RELEASE ORAL at 09:12

## 2017-12-01 RX ADMIN — ROSUVASTATIN CALCIUM 10 MG: 10 TABLET ORAL at 09:12

## 2017-12-01 RX ADMIN — LEVOTHYROXINE SODIUM 88 MCG: 88 TABLET ORAL at 04:12

## 2017-12-01 RX ADMIN — LISINOPRIL 20 MG: 20 TABLET ORAL at 08:12

## 2017-12-01 RX ADMIN — TRAMADOL HYDROCHLORIDE 50 MG: 50 TABLET, FILM COATED ORAL at 01:12

## 2017-12-01 RX ADMIN — METOPROLOL TARTRATE 25 MG: 25 TABLET ORAL at 09:12

## 2017-12-01 RX ADMIN — CEFTRIAXONE 1 G: 1 INJECTION, SOLUTION INTRAVENOUS at 06:12

## 2017-12-01 RX ADMIN — DULOXETINE 30 MG: 30 CAPSULE, DELAYED RELEASE ORAL at 08:12

## 2017-12-01 NOTE — CONSULTS
REQUESTING PHYSICIAN:  Dr. Lea.    REASON FOR CONSULTATION:  Bacteremia.    HISTORY OF PRESENT ILLNESS:  This is a 72-year-old female who presented to the   ER by her .  The  was complaining that the patient did not quite   seem herself.  In the ER, she was worked up.  The UA appeared to have 15 white   blood cells and she had leukocytosis of 19,000.  Consequently, the patient was   admitted to the hospital.  She subsequently developed a bacteremia.  The urine   grew out an E. coli organism in the blood.  Right now, at this time, has   Gram-negative rods growing.  The patient's hospital stay was complicated by what   appeared to be an allergic event after she received Zosyn.  Consequently, this   was listed as an allergy for her at this point.  The patient was initially also   thought to have a renal abscess, but this was worked up by the Urology Service,   and this was found to be negative.  At this point, the patient states that she   is doing well without any complaints.    REVIEW OF SYSTEMS:  There are no fevers, chills, nausea, vomiting, diarrhea.  No   headaches, visual changes, auditory changes, neck stiffness, night sweats,   weight loss or weight gain.  There is no chest pain, PND, orthopnea or decreased   exercise intolerance.  No cough, wheeze, hemoptysis or shortness of breath.    There is no abdominal pain, melena, hematochezia.  No urinary incontinence,   frequency, urgency, dysuria.  There is no arthralgia or myalgia.    PAST MEDICAL HISTORY:  Osteoarthritis, hypertension, thyroid disease.    PAST SURGICAL HISTORY:  Hand surgery and thyroidectomy.    FAMILY HISTORY:  Positive for cardiovascular disease.    SOCIAL HISTORY:  The patient smokes, uses alcohol, does not use drugs.  She is   currently .    MEDICATIONS:  Duloxetine, ertapenem, furosemide, labetalol, levothyroxine,   lisinopril, metoprolol, pantoprazole, rosuvastatin.    ALLERGIES:  Meperidine, piperacillin/tazobactam  and statins.    PHYSICAL EXAMINATION:  GENERAL:  This is a pleasant female, appears her stated age.  VITAL SIGNS:  Last set of vital signs show a temperature of 36.6, pulse of 92,   respiratory rate 18, blood pressure 138/84.  HEENT:  Oral mucosa is moist.  There is no thrush or leukoplakia.  NECK:  Supple.  There is no lymphadenopathy.  LUNGS:  Clear to auscultation bilaterally.  HEART:  Regular.  Positive S1, S2.  ABDOMEN:  Soft, nontender.  EXTREMITIES:  Without edema.  CARDIOVASCULAR:  2+ radial pulses bilaterally.  Capillary refill less than 2   seconds.  No carotid bruits.  NEUROLOGIC:  She is alert and oriented x3, moving all extremities spontaneously.  SKIN:  No lesions on exam.    LABORATORY DATA:  White count 9.62, hemoglobin of 9.3, platelets of 309.  BUN of   11, creatinine of 0.9, bicarbonate of 26.    MICROBIOLOGICAL DATA:  As in the narrative.    IMAGING:  Echocardiogram was reviewed, it was negative.  A lower extremity DVT   scan was negative.  X-ray of her chest is essentially negative as well.    Ultrasound of her kidneys show no strong indication for pyelonephritis or renal   abscess.  The rest of her imaging was reviewed.    IMPRESSION:  This is a 72-year-old female with bacteremia, that is likely   secondary to urinary tract infection that was positive for Escherichia coli.    The patient also did have an episode of an allergic event.    RECOMMENDATIONS:  At this time, her urine cultures are growing E. coli organism.    Blood cultures are growing Gram-negative delia, presumably also an E. coli   organism.    She is on ertapenem from a presumed allergic event and I agree that she should   remain on it for the time being.    A 2D echo was negative for vegetations and a repeat blood cultures showed   currently no growth to date.  In the meanwhile, I will continue to follow with   you.    Thank you kindly for allowing me to participate in the care of this patient.      FRANCISCO  dd: 11/30/2017 19:14:33  (CST)  td: 12/01/2017 03:42:14 (CST)  Doc ID   #[id number]  Job ID #[id number]    CC: Sol Sarah M.D.

## 2017-12-01 NOTE — PROGRESS NOTES
Progress Note  Infectious Disease    Admit Date: 11/27/2017   LOS: 4 days     SUBJECTIVE:     Follow-up For:  E coli sepsis    Antibiotics     Start     Stop Route Frequency Ordered    11/30/17 1000  ertapenem (INVANZ) 1 g in sodium chloride 0.9 % 100 mL IVPB (ready to mix system)      -- IV Every 24 hours (non-standard times) 11/30/17 0722          Review of Systems:  Constitutional: no fever or chills  Eyes: no visual changes  ENT: no nasal congestion or sore throat  Respiratory: no cough or shortness of breath  Cardiovascular: no chest pain or palpitations  Gastrointestinal: no nausea or vomiting, no abdominal pain or change in bowel habits  Genitourinary: no hematuria or dysuria  Musculoskeletal: no arthralgias or myalgias  Neurological: no seizures or tremors    OBJECTIVE:     Vital Signs (Most Recent)  Temp: 98 °F (36.7 °C) (12/01/17 1241)  Pulse: 92 (12/01/17 1241)  Resp: 18 (12/01/17 1241)  BP: 125/66 (12/01/17 1241)  SpO2: (!) 93 % (12/01/17 1241)    Temperature Range Min/Max (Last 24H):  Temp:  [96.8 °F (36 °C)-98 °F (36.7 °C)]     I & O (Last 24H):  Intake/Output Summary (Last 24 hours) at 12/01/17 1742  Last data filed at 12/01/17 1210   Gross per 24 hour   Intake              580 ml   Output                0 ml   Net              580 ml       Physical Exam:  General: well developed, well nourished  Eyes: conjunctivae/corneas clear. PERRL..  HENT: Head:normocephalic, atraumatic. Ears:bilateral TM's and external ear canals normal. Nose: Nares normal. Septum midline. Mucosa normal. No drainage or sinus tenderness., no discharge. Throat: lips, mucosa, and tongue normal; teeth and gums normal and no throat erythema.  Neck: supple, symmetrical, trachea midline, no JVD and thyroid not enlarged, symmetric, no tenderness/mass/nodules  Lungs:  clear to auscultation bilaterally and normal respiratory effort  Cardiovascular: Heart: regular rate and rhythm, S1, S2 normal, no murmur, click, rub or gallop. Chest Wall:  no tenderness. Extremities: no cyanosis or edema, or clubbing. Pulses: 2+ and symmetric.  Abdomen/Rectal: Abdomen: soft, non-tender non-distented; bowel sounds normal; no masses,  no organomegaly. Rectal: not examined  Skin: Skin color, texture, turgor normal. No rashes or lesions  Musculoskeletal:no clubbing, cyanosis  Lymph Nodes: No cervical or supraclavicular adenopathy    Lines/Drains:       Peripheral IV - Single Lumen 11/30/17 1855 Right Forearm (Active)   Site Assessment Clean;Dry;Intact 12/1/2017  7:34 AM       Laboratory:  CBC    Recent Labs  Lab 12/01/17 0444   WBC 5.30   RBC 3.30*   HGB 9.0*   HCT 28.6*   *   MCV 87   MCH 27.3   MCHC 31.5*     BMP    Recent Labs  Lab 12/01/17 0444      K 2.9*      CO2 27   BUN 9   CREATININE 0.9   CALCIUM 8.4*     Microbiology Results (last 7 days)     Procedure Component Value Units Date/Time    Blood culture [[id number]] Collected:  11/30/17 0755    Order Status:  Completed Specimen:  Blood Updated:  12/01/17 0903     Blood Culture, Routine No Growth to date     Blood Culture, Routine No Growth to date    Blood culture [[id number]] Collected:  11/30/17 0746    Order Status:  Completed Specimen:  Blood Updated:  12/01/17 0903     Blood Culture, Routine No Growth to date     Blood Culture, Routine No Growth to date    Blood Culture #2 **CANNOT BE ORDERED STAT** [[id number]] Collected:  11/27/17 2320    Order Status:  Completed Specimen:  Blood from Peripheral, Hand, Right Updated:  12/01/17 0725     Blood Culture, Routine Gram stain ashley bottle: Gram negative rods     Blood Culture, Routine Results called to and read back by:Tamra Arnold     Blood Culture, Routine 11/29/2017  21:53     Blood Culture, Routine --     ESCHERICHIA COLI  For susceptibility refer to order # [id number]      Blood Culture #1 **CANNOT BE ORDERED STAT** [[id number]]  (Susceptibility) Collected:  11/27/17 2325    Order Status:  Completed Specimen:  Blood from Peripheral, Hand,  Left Updated:  12/01/17 0720     Blood Culture, Routine Gram stain ashley bottle: Gram negative rods     Blood Culture, Routine previously positive called. 11/29/2017  22:00     Blood Culture, Routine ESCHERICHIA COLI    Urine culture **CANNOT BE ORDERED STAT** [[id number]]  (Susceptibility) Collected:  11/27/17 2131    Order Status:  Completed Specimen:  Urine from Urine, Clean Catch Updated:  11/29/17 0927     Urine Culture, Routine --     ESCHERICHIA COLI  >100,000 cfu/ml            Recent Labs  Lab 11/27/17 2131   COLORU Yellow   SPECGRAV 1.005   PHUR 5.0   PROTEINUA Negative   BACTERIA Moderate*   NITRITE Negative   LEUKOCYTESUR Trace*   UROBILINOGEN Negative       Diagnostic Results:  Labs: Reviewed  X-Ray: Reviewed  US: Reviewed  CT: Reviewed    ASSESSMENT/PLAN:     Active Hospital Problems    Diagnosis  POA    *Sepsis secondary to UTI [A41.9, N39.0]  Yes    Bacteremia due to Escherichia coli [R78.81]  Yes    Sinus tachycardia [R00.0]  No    Neutrophilic leukocytosis [D72.9]  Yes    Essential hypertension [I10]  Yes    Thyroid disease [E07.9]  Yes    UTI (urinary tract infection) [N39.0]  Yes    Pyelonephritis, acute [N10]  Yes      Resolved Hospital Problems    Diagnosis Date Resolved POA   No resolved problems to display.       1. E coli sepsis  2. E. Coli uti  3. DOUBT PEN ALLERGY  SHE HAD RIGORS AT HOME BEFORE SHE GOT ANY PENICILLIN AND RIGORS ARE COMMONLY SEEN WITH GNR SEPSIS AND PYELONEPHRITIS  SHE HAS HAD PENICILLINS BEFORE  REMOVE FROM ALLERGY LIST  I will change to iv rocephin  If blood stays negative and fever down will rx with po cefuroxime 500 mg bid x 14 days

## 2017-12-01 NOTE — PLAN OF CARE
Problem: Patient Care Overview  Goal: Plan of Care Review  Up to chair for breakfast this am. Appetite better. Still complains of headache and pt reported it to MD on rounds this morning. Gets up ad eyal to bathroom and will call for supervision. PT OT attempts to work with pt but she usually refuses. Afebrile with continued antibiotics.

## 2017-12-01 NOTE — PT/OT/SLP PROGRESS
"Physical Therapy      Fabienne Goncalves  MRN: [id number]    Patient not seen today secondary to Other ( pt is sleeping, pt's spouse at bedside) . Will follow-up as able later hour/day .  Second attempt , pt c/o nausea. Pt stated : " I did get up and move around this morning , I am fine "   Corina Blair, PTA    "

## 2017-12-01 NOTE — PLAN OF CARE
Problem: Patient Care Overview  Goal: Plan of Care Review  Outcome: Ongoing (interventions implemented as appropriate)   11/30/17 9092   Coping/Psychosocial   Plan Of Care Reviewed With patient

## 2017-12-01 NOTE — PT/OT/SLP PROGRESS
Occupational Therapy  Treatment/Discharge    Fabienne Goncalves   MRN: [id number]   Admitting Diagnosis: Sepsis secondary to UTI    OT Date of Treatment: 12/01/17   OT Start Time: 0815  OT Stop Time: 0826  OT Total Time (min): 11 min    Billable Minutes:  Self Care/Home Management 11    General Precautions: Standard, fall  Orthopedic Precautions: N/A  Braces: N/A         Subjective:  Communicated with Ade prior to session.    Pain/Comfort  Pain Rating 1: 0/10  Pain Rating Post-Intervention 1: 5/10  Location - Side 2:  (headache)  Pain Rating Post-Intervention 2: 5/10    Objective:  Patient found with: peripheral IV, telemetry     Functional Mobility:  Bed Mobility:  Rolling/Turning to Left: Modified independent  Rolling/Turning Right: Modified independent  Scooting/Bridging: Modified Independent  Supine to Sit: Modified Independent    Transfers:   Sit <> Stand Assistance: Modified Independent  Sit <> Stand Assistive Device: No Assistive Device  Bed <> Chair Technique: Stand Pivot  Bed <> Chair Transfer Assistance: Modified Independent    Functional Ambulation: No problems noted     Activities of Daily Living:  Feeding Level of Assistance: Independent  UE Dressing Level of Assistance: Independent  LE Dressing Level of Assistance: Independent      Balance:   Static Sit: GOOD+: Takes MAXIMAL challenges from all directions.    Dynamic Sit: GOOD+: Maintains balance through MAXIMAL excursions of active trunk motion  Static Stand: GOOD: Takes MODERATE challenges from all directions  Dynamic stand: GOOD+: Independent gait (with or without assistive device)      AM-PAC 6 CLICK ADL   How much help from another person does this patient currently need?   1 = Unable, Total/Dependent Assistance  2 = A lot, Maximum/Moderate Assistance  3 = A little, Minimum/Contact Guard/Supervision  4 = None, Modified [place]/Independent    Putting on and taking off regular lower body clothing? : 4  Bathing (including washing, rinsing,  "drying)?: 3  Toileting, which includes using toilet, bedpan, or urinal? : 3  Putting on and taking off regular upper body clothing?: 4  Taking care of personal grooming such as brushing teeth?: 4  Eating meals?: 4  Total Score: 22     AM-PAC Raw Score CMS "G-Code Modifier Level of Impairment Assistance   6 % Total / Unable   7 - 8 CM 80 - 100% Maximal Assist   9-13 CL 60 - 80% Moderate Assist   14 - 19 CK 40 - 60% Moderate Assist   20 - 22 CJ 20 - 40% Minimal Assist   23 CI 1-20% SBA / CGA   24 CH 0% Independent/ Mod I       Patient left up in chair with call button in reach nurse present giving medication.    ASSESSMENT:  Fabienne Goncalves is a 72 y.o. female with a medical diagnosis of Sepsis secondary to UTI and presents with good family support. Pt is currently independent to modified independently with self care and bed mobility. No further OT indicated at this time.    Rehab identified problem list/impairments: Rehab identified problem list/impairments: weakness, impaired endurance    Rehab potential is good.    Activity tolerance: Good    Discharge recommendations: Discharge Facility/Level Of Care Needs: home     Barriers to discharge: Barriers to Discharge: None    Equipment recommendations: none     GOALS:    Occupational Therapy Goals        Problem: Occupational Therapy Goal    Goal Priority Disciplines Outcome Interventions   Occupational Therapy Goal     OT, PT/OT Ongoing (interventions implemented as appropriate)    Description:  Goals to be met by: 12.6.17     Patient will increase functional independence with ADLs by performing:      UE Dressing with Modified [place].  LE Dressing with Set-up Assistance.  Grooming while standing with Set-up Assistance.  Toileting from toilet with Set-up Assistance for hygiene and clothing management.   Toilet transfer to toilet with Supervision.  Upper extremity exercise program x20 reps per handout, with independence.                    Plan:  Patient to " be seen 3 x/week to address the above listed problems via self-care/home management, therapeutic activities, therapeutic exercises  Plan of Care expires: 12/06/17  Plan of Care reviewed with: patient         Mónica Mcknight OT  12/01/2017

## 2017-12-01 NOTE — PLAN OF CARE
Patient will discharge home with spouse when medically stable, per attending MD patient tentative discharge date will be on Monday 12/4/17. PT/OT recommends outpatient rehab. SW will continue to assist as needed.       12/01/17 6686   Discharge Reassessment   Assessment Type Discharge Planning Reassessment   Provided patient/caregiver education on the expected discharge date and the discharge plan No   Do you have any problems affording any of your prescribed medications? No   Discharge Plan A Home with family  (Outpatient PT/OT)   Discharge Plan B Home with family   Patient choice form signed by patient/caregiver N/A

## 2017-12-01 NOTE — PROGRESS NOTES
"[facility] Hospital Medicine  Progress Note    Patient Name: Fabienne Goncalves  MRN: [id number]  Patient Class: IP- Inpatient   Admission Date: 11/27/2017  Length of Stay: 4 days  Attending Physician: Tamiko Mendosa MD  Primary Care Provider: Primary Doctor No        Subjective:     Principal Problem:Sepsis secondary to UTI    HPI:  Fabienne Goncalves is a 72 y.o. female that (in part)  has a past medical history of Arthritis; Hypertension; and Thyroid disease. Presents to [facility] Emergency Department complaining of nausea and abdominal pain.  Decreased appetite.  No bowel movement for 4 days.  Lethargic.  Short of breath with exertion.   reports she is not "acting herself".  She was evaluated for similar symptoms at [facility] earlier last week.  A head CT was performed which was negative.  She had progressively worsening generalized weakness without focal deficit.  Left flank pain.  Denies hematuria.  Constant duration.  This is a recurring problem for her over the last week.  No relieving factors.  No specific exacerbating factors.  No radiation of pain.    In the emergency department routine laboratory studies and urinalysis were obtained.  She had evidence of urinary tract infection and sepsis.  A CT of the abdomen and pelvis were performed which was concerning for possible perinephric abscess.  Urology has been consulted.  Cultures were obtained of blood in urine and appeared antibiotics were initiated.    Hospital medicine has been asked to admit for further evaluation and treatment.     Hospital Course:  Fabienne Goncalves is a 72 y.o. female that (in part)  has a past medical history of Arthritis; Hypertension; and Thyroid disease. Presents to [facility] Emergency Department complaining of nausea and abdominal pain.  Decreased appetite.  No bowel movement for 4 days.  Lethargic.  Short of breath with exertion.  " " reports she is not "acting herself".  She was evaluated for similar symptoms at [facility] earlier last week.  A head CT was performed which was negative.  She had progressively worsening generalized weakness without focal deficit.  Left flank pain.  Denies hematuria.  Constant duration.  This is a recurring problem for her over the last week.  No relieving factors.  No specific exacerbating factors.  No radiation of pain.    In the emergency department routine laboratory studies and urinalysis were obtained.  She had evidence of urinary tract infection and sepsis.  A CT of the abdomen and pelvis were performed which was concerning for possible perinephric abscess.  Urology has been consulted.  Cultures were obtained of blood in urine and appeared antibiotics were initiated.  US kidney showed no sign of abscess,patient has Ecoli  on urine culture.  Patient has episode of tachycardia on 11.28,17 ,which resolved with labetalol,has been reported over night patient had cyanotic episode,which resolved with supportive care,Zosyn has been added to allergy list,she denies history of allergy to PEN. She say she was on PEN before.started on Ertanepem,she has also positive blood culture for Ecoli,source UTI,repeat blood culture,consulted ID for Abx rec.  Has normal TSH and Echo showed preserved EF with diastolic CHF.    Interval History: patient is afebrile,feel better.    Review of Systems   Constitutional: Positive for activity change. Negative for appetite change.   HENT: Negative for congestion and dental problem.    Eyes: Negative for discharge and itching.   Respiratory: Negative for apnea and chest tightness.    Cardiovascular: Negative for chest pain and leg swelling.   Gastrointestinal: Negative for abdominal distention and abdominal pain.   Endocrine: Negative for heat intolerance.   Genitourinary: Negative for difficulty urinating and dyspareunia.   Musculoskeletal: Positive for myalgias. "   Skin: Negative for pallor.   Allergic/Immunologic: Negative for environmental allergies and food allergies.   Neurological: Negative for dizziness and facial asymmetry.   Hematological: Negative for adenopathy. Does not bruise/bleed easily.     Objective:     Vital Signs (Most Recent):  Temp: 97 °F (36.1 °C) (12/01/17 0426)  Pulse: 100 (12/01/17 0426)  Resp: 20 (12/01/17 0426)  BP: (!) 145/78 (12/01/17 0426)  SpO2: (!) 93 % (12/01/17 0426) Vital Signs (24h Range):  Temp:  [96.8 °F (36 °C)-98.1 °F (36.7 °C)] 97 °F (36.1 °C)  Pulse:  [] 100  Resp:  [16-20] 20  SpO2:  [93 %-97 %] 93 %  BP: (123-160)/(66-84) 145/78     Weight: 77.1 kg (170 lb)  Body mass index is 27.44 kg/m².    Intake/Output Summary (Last 24 hours) at 12/01/17 0627  Last data filed at 11/30/17 1800   Gross per 24 hour   Intake              700 ml   Output                0 ml   Net              700 ml      Physical Exam   Constitutional: She is oriented to person, place, and time. No distress.   HENT:   Head: Atraumatic.   Eyes: EOM are normal. Pupils are equal, round, and reactive to light.   Neck: Normal range of motion. Neck supple.   Cardiovascular: Regular rhythm.    Pulmonary/Chest: Breath sounds normal.   Abdominal: Soft. Bowel sounds are normal.   Musculoskeletal: Normal range of motion.   Neurological: She is oriented to person, place, and time. No cranial nerve deficit.   Skin: She is not diaphoretic.   Psychiatric: She has a normal mood and affect. Her behavior is normal.       Significant Labs:   BMP:     Recent Labs  Lab 12/01/17 0444   GLU 80      K 2.9*      CO2 27   BUN 9   CREATININE 0.9   CALCIUM 8.4*     CBC:     Recent Labs  Lab 11/30/17 0459 12/01/17 0444   WBC 9.62 5.30   HGB 9.3* 9.0*   HCT 28.3* 28.6*    398*       Significant Imaging: reviewed    Assessment/Plan:      * Sepsis secondary to UTI    · As evidenced by history, physical exam, UA, and CT imaging  · CT shows concern for possible  perinephric abscess.  Urology consulted.US show no abscess.  · 2 out of 4 SIRS criteria.  · Urine culture and Gram stain obtained prior to antibiotics  · Given empiric antibiotics  · Will tailor antibiotic regimen according to culture & sensitivity results  · Maintain euvolemic status with IV fluids  · On IV Abx,improving.          Bacteremia due to Escherichia coli    Source UTI,repeated blood culture,on IV Abx,consulted ID          Sinus tachycardia    May duo to sepsis,alaso may had allergy reaction to Zosyn,Abx changed to ertanepem,no DVT on legs.          UTI (urinary tract infection)    · Ecoli  on culture,CT findings are consistent with probable perinephric abscess in the setting of sepsis secondary to pyelonephritis. But kidney Us showed no sign of abscess.  · Will continue with IV Abx            Thyroid disease    ·has normal free T 4,on synthroid.          Essential hypertension    Started on BB,lisinopril and lasix.          Neutrophilic leukocytosis    Secondary to sepsis associated with urinary tract infection as noted above.  Trending wbc's.improved.          Pyelonephritis, acute    Acute ,Management of pyelonephritis as noted above.            VTE Risk Mitigation         Ordered     Place JUAN hose  Until discontinued      11/28/17 7618     Medium Risk of VTE  Once      11/28/17 6758              Tamiko Mendosa MD  Department of Hospital Medicine   [facility]

## 2017-12-01 NOTE — CONSULTS
.ID CONSULT     FULL CONSULT DICTATED#[id number]    IMPRESSION;   1) Bacteremia.  2) UTI; E coli.  3) Allergic event.     RECOMMENDATIONS;   1) UCx are growing an E coli organism.  2) The blood cx's are growing a GNR- presumably also an E coli organism.  3) Pt is on ertapenem from a presumed allergic event... I agree that she should remain on it.  4) 2D echo was negative for vege's.  5) Repeat blood cx's are NGTD.  6) We will cont to follow.

## 2017-12-01 NOTE — PHYSICIAN QUERY
PT Name: Fabienne Goncalves  MR #: [id number]     Physician Query Form - Diagnosis Clarification      CDS/: Sherri Schmitz RN, CCDS               Contact information:  [email or web address]    This form is a permanent document in the medical record.     Query Date: December 1, 2017    By submitting this query, we are merely seeking further clarification of documentation.  Please utilize your independent clinical judgment when addressing the question(s) below.     The medical record contains the following:      Findings Supporting Clinical Information Location in Medical Record           Perinephric abscess          Perinephric abscess   Sepsis secondary to UTI    Pyelonephritis    Perinephric abscess  CT findings are consistent with probable perinephric abscess in the setting of sepsis secondary to pyelonephritis.     Perinephric abscess  Obtain renal ultrasound to see if these areas are abscesses.  If so then she will need IR consult for percutaneous drainage.    UTI (urinary tract infection)  Follow culture  Does not appear to be abscess    UTI (urinary tract infection)  E coli on culture,CT findings are consistent with probable perinephric abscess in the setting of sepsis secondary to pyelonephritis. But kidney Us showed no sign of abscess.  Will continue with IV Abx   H & P 11/28    H & P 11/28    H & P 11/28          Urology consult 11/28          Urology progress note 11/29        Hospital Medicine progress note 12/01 6:28 AM     Please clarify if the perinephric abscess diagnosis has been:    [  ] Ruled In  [  ] Ruled In, Now Resolved  [ x ] Ruled Out  [  ] Clinically undetermined  [  ] Other/Clarification of findings (please specify)_______________________________    Please document in your progress notes daily for the duration of treatment, until resolved, and include in your discharge summary.

## 2017-12-01 NOTE — PHYSICIAN QUERY
PT Name: Fabienne Goncalves  MR #: [id number]     Physician Query Form - Documentation Clarification      CDS/: Sherri Schmitz RN, CCDS               Contact information:  [email or web address]    This form is a permanent document in the medical record.     Query Date: December 1, 2017    By submitting this query, we are merely seeking further clarification of documentation. Please utilize your independent clinical judgment when addressing the question(s) below.    The Medical record reflects the following:    Supporting Clinical Findings Location in Medical Record   72 year old female with HTN, thyroid disease, arthritis, and hx of iron deficiency presents to the ED c/o headaches and chronic, worsening arthralgia x 3 weeks.    He states he has noticed the pt gets SOB on exertion.   No hx of cardiac are kidney diseases.     Pulmonary/Chest: Breath sounds normal. No respiratory distress. She has no wheezes. She has no rhonchi. She has no rales. She exhibits no tenderness.   sao2 91 percent room air     Musculoskeletal: Normal range of motion. She exhibits no edema or tenderness.     Independently Interpreted Readings:   Chest X-Ray: Normal heart size.  No infiltrates.  No acute abnormalities.     BNP  28      Short of breath with exertion.     Prior to admission medications  hydrochlorothiazide  metoprolol tartrate     Extremities: no cyanosis, clubbing or edema.     Sepsis secondary to UTI  Pyelonephritis  Hypertension  Holding home antihypertensive regimen in the setting of sepsis.  Beta blockers are contraindicated.    Has normal TSH and Echo showed preserved EF with diastolic CHF.    Furosemide oral  Labetalol intravenous PRN:  SPB greater than, 160  Lisinopril oral   Metoprolol tartrate oral ED provider note       ED provider note       ED provider note         ED provider note      ED provider note       Lab 11/27/17    H & P 11/28    H & P 11/28        H & P 11/28    H & P 11/28            Hospital Medicine  progress note 11/30    MAR                                                                                      Doctor, Please specify diagnosis or diagnoses associated with above clinical findings.  Please specify the acuity of diastolic CHF.    Provider Use Only      Chronic diastolic CHF                                                                                                                          [  ] Clinically undetermined

## 2017-12-01 NOTE — SUBJECTIVE & OBJECTIVE
Interval History: patient is afebrile,feel better.    Review of Systems   Constitutional: Positive for activity change. Negative for appetite change.   HENT: Negative for congestion and dental problem.    Eyes: Negative for discharge and itching.   Respiratory: Negative for apnea and chest tightness.    Cardiovascular: Negative for chest pain and leg swelling.   Gastrointestinal: Negative for abdominal distention and abdominal pain.   Endocrine: Negative for heat intolerance.   Genitourinary: Negative for difficulty urinating and dyspareunia.   Musculoskeletal: Positive for myalgias.   Skin: Negative for pallor.   Allergic/Immunologic: Negative for environmental allergies and food allergies.   Neurological: Negative for dizziness and facial asymmetry.   Hematological: Negative for adenopathy. Does not bruise/bleed easily.     Objective:     Vital Signs (Most Recent):  Temp: 97 °F (36.1 °C) (12/01/17 0426)  Pulse: 100 (12/01/17 0426)  Resp: 20 (12/01/17 0426)  BP: (!) 145/78 (12/01/17 0426)  SpO2: (!) 93 % (12/01/17 0426) Vital Signs (24h Range):  Temp:  [96.8 °F (36 °C)-98.1 °F (36.7 °C)] 97 °F (36.1 °C)  Pulse:  [] 100  Resp:  [16-20] 20  SpO2:  [93 %-97 %] 93 %  BP: (123-160)/(66-84) 145/78     Weight: 77.1 kg (170 lb)  Body mass index is 27.44 kg/m².    Intake/Output Summary (Last 24 hours) at 12/01/17 0627  Last data filed at 11/30/17 1800   Gross per 24 hour   Intake              700 ml   Output                0 ml   Net              700 ml      Physical Exam   Constitutional: She is oriented to person, place, and time. No distress.   HENT:   Head: Atraumatic.   Eyes: EOM are normal. Pupils are equal, round, and reactive to light.   Neck: Normal range of motion. Neck supple.   Cardiovascular: Regular rhythm.    Pulmonary/Chest: Breath sounds normal.   Abdominal: Soft. Bowel sounds are normal.   Musculoskeletal: Normal range of motion.   Neurological: She is oriented to person, place, and time. No cranial  nerve deficit.   Skin: She is not diaphoretic.   Psychiatric: She has a normal mood and affect. Her behavior is normal.       Significant Labs:   BMP:     Recent Labs  Lab 12/01/17 0444   GLU 80      K 2.9*      CO2 27   BUN 9   CREATININE 0.9   CALCIUM 8.4*     CBC:     Recent Labs  Lab 11/30/17 0459 12/01/17 0444   WBC 9.62 5.30   HGB 9.3* 9.0*   HCT 28.3* 28.6*    398*       Significant Imaging: reviewed

## 2017-12-02 VITALS
HEART RATE: 76 BPM | RESPIRATION RATE: 18 BRPM | OXYGEN SATURATION: 95 % | HEIGHT: 66 IN | DIASTOLIC BLOOD PRESSURE: 71 MMHG | SYSTOLIC BLOOD PRESSURE: 120 MMHG | BODY MASS INDEX: 27.32 KG/M2 | TEMPERATURE: 98 F | WEIGHT: 170 LBS

## 2017-12-02 LAB
ANION GAP SERPL CALC-SCNC: 9 MMOL/L
BASOPHILS # BLD AUTO: 0.04 K/UL
BASOPHILS NFR BLD: 0.8 %
BUN SERPL-MCNC: 8 MG/DL
CALCIUM SERPL-MCNC: 8.9 MG/DL
CHLORIDE SERPL-SCNC: 100 MMOL/L
CO2 SERPL-SCNC: 29 MMOL/L
CREAT SERPL-MCNC: 0.9 MG/DL
DIFFERENTIAL METHOD: ABNORMAL
EOSINOPHIL # BLD AUTO: 0.1 K/UL
EOSINOPHIL NFR BLD: 2.1 %
ERYTHROCYTE [DISTWIDTH] IN BLOOD BY AUTOMATED COUNT: 15.2 %
EST. GFR  (AFRICAN AMERICAN): >60 ML/MIN/1.73 M^2
EST. GFR  (NON AFRICAN AMERICAN): >60 ML/MIN/1.73 M^2
GLUCOSE SERPL-MCNC: 93 MG/DL
HCT VFR BLD AUTO: 29.2 %
HGB BLD-MCNC: 9.7 G/DL
LYMPHOCYTES # BLD AUTO: 0.8 K/UL
LYMPHOCYTES NFR BLD: 16.7 %
MCH RBC QN AUTO: 28 PG
MCHC RBC AUTO-ENTMCNC: 33.2 G/DL
MCV RBC AUTO: 84 FL
MONOCYTES # BLD AUTO: 0.4 K/UL
MONOCYTES NFR BLD: 8.5 %
NEUTROPHILS # BLD AUTO: 3.4 K/UL
NEUTROPHILS NFR BLD: 71.5 %
PLATELET # BLD AUTO: 464 K/UL
PMV BLD AUTO: 9.1 FL
POTASSIUM SERPL-SCNC: 3.2 MMOL/L
RBC # BLD AUTO: 3.47 M/UL
SODIUM SERPL-SCNC: 138 MMOL/L
WBC # BLD AUTO: 4.72 K/UL

## 2017-12-02 PROCEDURE — 85025 COMPLETE CBC W/AUTO DIFF WBC: CPT

## 2017-12-02 PROCEDURE — 80048 BASIC METABOLIC PNL TOTAL CA: CPT

## 2017-12-02 PROCEDURE — 25000003 PHARM REV CODE 250: Performed by: HOSPITALIST

## 2017-12-02 PROCEDURE — 25000003 PHARM REV CODE 250: Performed by: EMERGENCY MEDICINE

## 2017-12-02 PROCEDURE — 36415 COLL VENOUS BLD VENIPUNCTURE: CPT

## 2017-12-02 PROCEDURE — 94761 N-INVAS EAR/PLS OXIMETRY MLT: CPT

## 2017-12-02 RX ORDER — CEFUROXIME AXETIL 500 MG/1
500 TABLET ORAL 2 TIMES DAILY
Qty: 28 TABLET | Refills: 0 | Status: SHIPPED | OUTPATIENT
Start: 2017-12-02 | End: 2017-12-16

## 2017-12-02 RX ORDER — LISINOPRIL 20 MG/1
20 TABLET ORAL DAILY
Qty: 30 TABLET | Refills: 0 | Status: SHIPPED | OUTPATIENT
Start: 2017-12-03 | End: 2018-09-10

## 2017-12-02 RX ORDER — POTASSIUM CHLORIDE 750 MG/1
50 TABLET, EXTENDED RELEASE ORAL ONCE
Status: COMPLETED | OUTPATIENT
Start: 2017-12-02 | End: 2017-12-02

## 2017-12-02 RX ADMIN — LISINOPRIL 20 MG: 20 TABLET ORAL at 08:12

## 2017-12-02 RX ADMIN — FUROSEMIDE 40 MG: 40 TABLET ORAL at 08:12

## 2017-12-02 RX ADMIN — METOPROLOL TARTRATE 25 MG: 25 TABLET ORAL at 08:12

## 2017-12-02 RX ADMIN — LEVOTHYROXINE SODIUM 88 MCG: 88 TABLET ORAL at 06:12

## 2017-12-02 RX ADMIN — PANTOPRAZOLE SODIUM 40 MG: 40 TABLET, DELAYED RELEASE ORAL at 08:12

## 2017-12-02 RX ADMIN — TRAMADOL HYDROCHLORIDE 50 MG: 50 TABLET, FILM COATED ORAL at 08:12

## 2017-12-02 RX ADMIN — DULOXETINE 30 MG: 30 CAPSULE, DELAYED RELEASE ORAL at 08:12

## 2017-12-02 RX ADMIN — POTASSIUM CHLORIDE 50 MEQ: 750 TABLET, EXTENDED RELEASE ORAL at 08:12

## 2017-12-02 NOTE — PROGRESS NOTES
Pt d/c'd to home. Left floor with  ambulating to privately owned auto. Encouraged pt to take w/c but refused. Instructions given with packet to pt.

## 2017-12-02 NOTE — PLAN OF CARE
Problem: Fall Risk (Adult)  Intervention: Monitor/Assist with Self Care   12/01/17 1910 12/02/17 0609   Functional Level Current   Ambulation 0 - independent --    Transferring 0 - independent --    Toileting 0 - independent --    Bathing 0 - independent --    Dressing 0 - independent --    Eating 0 - independent --    Communication 0 - understands/communicates without difficulty --    Swallowing 0 - swallows foods/liquids without difficulty --    Activity   Activity Assistance Provided --  assistance, 1 person     Intervention: Reduce Risk/Promote Restraint Free Environment   12/02/17 0952   Safety Interventions   Safety Precautions emergency equipment at bedside     Intervention: Review Medications/Identify Contributors to Fall Risk   12/02/17 0954   Safety Interventions   Medication Review/Management medications reviewed     Intervention: Patient Rounds   12/02/17 0952   Safety Interventions   Patient Rounds bed in low position;bed wheels locked;clutter free environment maintained;call light in reach;ID band on;placement of personal items at bedside;toileting offered;visualized patient     Intervention: Safety Promotion/Fall Prevention   12/02/17 0952   Safety Interventions   Safety Promotion/Fall Prevention assistive device/personal item within reach;lighting adjusted;side rails raised x 2     Intervention: Safety Precautions   12/02/17 0952   Safety Interventions   Safety Precautions emergency equipment at bedside       Goal: Identify Related Risk Factors and Signs and Symptoms  Related risk factors and signs and symptoms are identified upon initiation of Human Response Clinical Practice Guideline (CPG)   Outcome: Ongoing (interventions implemented as appropriate)   12/02/17 0954   Fall Risk   Related Risk Factors (Fall Risk) polypharmacy;environment unfamiliar   Signs and Symptoms (Fall Risk) presence of risk factors     Goal: Absence of Falls  Patient will demonstrate the desired outcomes by  discharge/transition of care.   Outcome: Outcome(s) achieved Date Met: 12/02/17 12/02/17 0954   Fall Risk (Adult)   Absence of Falls achieves outcome

## 2017-12-02 NOTE — PLAN OF CARE
12/02/17 0950   Final Note   Assessment Type Final Discharge Note   Discharge Disposition Home   What phone number can be called within the next 1-3 days to see how you are doing after discharge? ([phone] )   Hospital Follow Up  Appt(s) scheduled? Yes  (Established [facility] PCP-)   Discharge plans and expectations educations in teach back method with documentation complete? Yes   Right Care Referral Info   Post Acute Recommendation No Care

## 2017-12-02 NOTE — PROGRESS NOTES
PCP follow-up scheduled for patient with Dr. Gordo Canela on 12/15/17 at 1:20pm for follow-up and to establish pcp; provided patient with Written Discharge Instructions regarding pcp follow-up.

## 2017-12-02 NOTE — PLAN OF CARE
Problem: Patient Care Overview  Goal: Plan of Care Review  Outcome: Ongoing (interventions implemented as appropriate)   12/02/17 1086   Coping/Psychosocial   Plan Of Care Reviewed With patient   Continues with IV antibiotic. Complaints of headache being managed with tramadol. Up to the bathroom independently. Blood pressure well controlled. Remains afebrile throughout the shift. Still with decreased appetite. No further complaints of pain the rest of the shift. Will continue plan of care.

## 2017-12-02 NOTE — DISCHARGE SUMMARY
"[facility] Hospital Medicine  Discharge Summary      Patient Name: Fabienne Goncalves  MRN: [id number]  Admission Date: 11/27/2017  Hospital Length of Stay: 5 days  Discharge Date and Time:  12/02/2017 9:13 AM  Attending Physician: Tamiko Mendosa MD   Discharging Provider: Tamiko Mendosa MD  Primary Care Provider: Primary Doctor No      HPI:   Fabienne Goncalves is a 72 y.o. female that (in part)  has a past medical history of Arthritis; Hypertension; and Thyroid disease. Presents to [facility] Emergency Department complaining of nausea and abdominal pain.  Decreased appetite.  No bowel movement for 4 days.  Lethargic.  Short of breath with exertion.   reports she is not "acting herself".  She was evaluated for similar symptoms at [facility] earlier last week.  A head CT was performed which was negative.  She had progressively worsening generalized weakness without focal deficit.  Left flank pain.  Denies hematuria.  Constant duration.  This is a recurring problem for her over the last week.  No relieving factors.  No specific exacerbating factors.  No radiation of pain.    In the emergency department routine laboratory studies and urinalysis were obtained.  She had evidence of urinary tract infection and sepsis.  A CT of the abdomen and pelvis were performed which was concerning for possible perinephric abscess.  Urology has been consulted.  Cultures were obtained of blood in urine and appeared antibiotics were initiated.    Hospital medicine has been asked to admit for further evaluation and treatment.     * No surgery found *      Hospital Course:   Fabienne Goncalves is a 72 y.o. female that (in part)  has a past medical history of Arthritis; Hypertension; and Thyroid disease. Presents to [facility] Emergency Department complaining of nausea and abdominal pain.  Decreased appetite.  No bowel movement for 4 days.  " "Lethargic.  Short of breath with exertion.   reports she is not "acting herself".  She was evaluated for similar symptoms at [facility] earlier last week.  A head CT was performed which was negative.  She had progressively worsening generalized weakness without focal deficit.  Left flank pain.  Denies hematuria.  Constant duration.  This is a recurring problem for her over the last week.  No relieving factors.  No specific exacerbating factors.  No radiation of pain.  In the emergency department routine laboratory studies and urinalysis were obtained.  She had evidence of urinary tract infection and sepsis.  A CT of the abdomen and pelvis were performed which was concerning for possible perinephric abscess.  Urology has been consulted.  Cultures were obtained of blood in urine and IV  antibiotics were initiated.  US kidney showed no sign of abscess,so Abscess has been ruled out,..patient has Ecoli  on urine culture.  Patient has episode of tachycardia on 11.28,17 ,which resolved with labetalol,has been reported over night patient had cyanotic episode,which resolved with supportive care,Zosyn has been added to allergy list,she denies history of allergy to PEN. She say she was on PEN before.started on Ertanepem,ID did not think patient is allergic to PEN,so Rocephin has been started and patient had no allergic reaction,So PEN has been removed from allergic list.  she has also positive blood culture for Ecoli,source UTI,repeated blood culture,consulted ID for Abx rec.  Has normal TSH and Echo showed preserved EF with diastolic CHF.  Repeat blood culture was negative,she did well with PT,OT and was feeling much better.  Patient has been discharged home with PO Cefuroxime for 2 weeks and follow up with PCP in next few days.       Consults:   Consults         Status Ordering Provider     Inpatient consult to Infectious Diseases  Once     Provider:  Sol Sarah MD    Completed NATANAEL, " HARVEY     Inpatient consult to Urology  Once     Provider:  STEFAN Moyer MD    Completed RIAZ JAMA new Assessment & Plan notes have been filed under this hospital service since the last note was generated.  Service: Hospital Medicine    Final Active Diagnoses:    Diagnosis Date Noted POA    PRINCIPAL PROBLEM:  Sepsis secondary to UTI [A41.9, N39.0] 11/28/2017 Yes    Bacteremia due to Escherichia coli [R78.81] 11/30/2017 Yes    Sinus tachycardia [R00.0] 11/29/2017 No    Neutrophilic leukocytosis [D72.9] 11/28/2017 Yes    Essential hypertension [I10] 11/28/2017 Yes    Thyroid disease [E07.9] 11/28/2017 Yes    UTI (urinary tract infection) [N39.0] 11/28/2017 Yes    Pyelonephritis, acute [N10] 11/27/2017 Yes      Problems Resolved During this Admission:    Diagnosis Date Noted Date Resolved POA       Discharged Condition: stable    Disposition: Home or Self Care    Follow Up:  Follow-up Information     PCP  In 1 week.               Patient Instructions:     Diet general     Activity as tolerated         Significant Diagnostic Studies: Labs:   BMP:   Recent Labs  Lab 12/01/17  0444 12/02/17  0526   GLU 80 93    138   K 2.9* 3.2*    100   CO2 27 29   BUN 9 8   CREATININE 0.9 0.9   CALCIUM 8.4* 8.9    and CBC   Recent Labs  Lab 12/01/17  0444 12/02/17  0526   WBC 5.30 4.72   HGB 9.0* 9.7*   HCT 28.6* 29.2*   * 464*     Microbiology:   Blood Culture   Lab Results   Component Value Date    LABBLOO No Growth to date 11/30/2017    LABBLOO No Growth to date 11/30/2017    LABBLOO No Growth to date 11/30/2017    and Urine Culture    Lab Results   Component Value Date    LABURIN ESCHERICHIA COLI  >100,000 cfu/ml   11/27/2017     Radiology: X-Ray: CXR: X-Ray Chest 1 View (CXR):   Results for orders placed or performed during the hospital encounter of 11/27/17   X-Ray Chest 1 View    Narrative    AP Portable Chest    Comparison: 11/27/17     Findings: Mild diffuse  interstitial prominence in the lungs, noting limited evaluation with AP portable chest. The cardiomediastinal contour is within normal limits.  No gross pleural effusions or pneumothorax identified.    Impression     Mild diffuse interstitial prominence, possible mild interstitial edema or pneumonitis.      Electronically signed by: Betito Armijo MD  Date:     11/28/17  Time:    15:04      Cardiac Graphics: Echocardiogram:   2D echo with color flow doppler:   Results for orders placed or performed during the hospital encounter of 11/27/17   2D echo with color flow doppler   Result Value Ref Range    EF 55 55 - 65    Diastolic Dysfunction Yes (A)     Est. PA Systolic Pressure 26.62     Pericardial Effusion SMALL (A)     Mitral Valve Mobility NORMAL     Tricuspid Valve Regurgitation TRIVIAL TO MILD        Pending Diagnostic Studies:     None         Medications:  Reconciled Home Medications:   Current Discharge Medication List      START taking these medications    Details   cefUROXime (CEFTIN) 500 MG tablet Take 1 tablet (500 mg total) by mouth 2 (two) times daily.  Qty: 28 tablet, Refills: 0      lisinopril (PRINIVIL,ZESTRIL) 20 MG tablet Take 1 tablet (20 mg total) by mouth once daily.  Qty: 30 tablet, Refills: 0         CONTINUE these medications which have NOT CHANGED    Details   butalbital-acetaminophen-caffeine -40 mg (FIORICET, ESGIC) -40 mg per tablet Take 1 tablet by mouth every 4 (four) hours as needed for Pain.      fish oil-omega-3 fatty acids 300-1,000 mg capsule Take 2 g by mouth once daily.      fluticasone (FLONASE) 50 mcg/actuation nasal spray 1 spray by Each Nare route once daily.      hydroCHLOROthiazide (HYDRODIURIL) 25 MG tablet Take 25 mg by mouth once daily.      hydroxychloroquine (PLAQUENIL) 200 mg tablet Take by mouth once daily.      levothyroxine (SYNTHROID) 88 MCG tablet Take 88 mcg by mouth once daily.      metoprolol tartrate (LOPRESSOR) 50 MG tablet Take 50 mg by mouth 2  (two) times daily.      multivitamin capsule Take 1 capsule by mouth once daily.      ondansetron (ZOFRAN) 8 MG tablet Take by mouth every 8 (eight) hours as needed for Nausea.      pantoprazole (PROTONIX) 40 MG tablet Take 40 mg by mouth once daily.      rosuvastatin (CRESTOR) 40 MG Tab Take 10 mg by mouth every evening.      DULoxetine (CYMBALTA) 30 MG capsule Take 30 mg by mouth once daily.             Indwelling Lines/Drains at time of discharge:   Lines/Drains/Airways          No matching active lines, drains, or airways          Time spent on the discharge of patient: 30  minutes  Patient was seen and examined on the date of discharge and determined to be suitable for discharge.         Tamiko Mendosa MD  Department of Hospital Medicine  [facility]

## 2017-12-05 LAB
BACTERIA BLD CULT: NORMAL
BACTERIA BLD CULT: NORMAL

## 2017-12-07 NOTE — PROGRESS NOTES
This note was created by combination of typed  and Dragon dictation.  Transcription errors may be present.  If there are any questions, please contact me.    Assessment & Plan  Sepsis secondary to UTI with E coli-Escherichia coli sensitive to current treatment, repeat blood cultures no growth.  Notes that the antibody has some mild side effects but finds it tolerable.  Has another 8 days of Aleve.  Finish out the cefuroxime.    Gastroesophageal reflux disease without esophagitis - in light of recent concerns about long term SE of PPIs, recommended trial of H2 blockers as she seems to be asymptomatic most days and occasionally with symptoms; would try H2 blockers regularly and PPI for breakthrough symptoms.    Medications Discontinued During This Encounter   Medication Reason    DULoxetine (CYMBALTA) 30 MG capsule        Follow-up: No Follow-up on file.      =================================================================      Chief Complaint   Patient presents with    [facility] followup       HPI  Fabienne is a 72 y.o. female, last appointment with this clinic was Visit date not found.    No LMP recorded. Patient is postmenopausal.    New to clinic.  She has been seeing primary care in [place].  She lives here but had gone to [place] because her daughter lives out there, establish care at [facility], so has been getting her care out in [place].  However his planning to change her primary care to here in [place] that she plans to continue to see her specialists out in [place].    Recent hospitalization for urosepsis.  Blood cultures and urine cultures growing out Escherichia coli.  Repeat blood cultures negative.  Discharged on cefuroxime.  Has some side effect of the cefuroxime, makes her feel nauseous but finds it tolerable overall.  TTE nl.  CT abd/pelvis without abscess.  Head CT negative.    Feels residual weakness and some dyspnea with exertion which has been slowly  improving since her discharge.      She is followed by a number of different specialists up in [place].  Namely 1 for iron deficiency anemia that responded pretty well to iron infusion in the past.  She also has MGUS and is followed by hematology up there.  Colonoscopy 2016 had AVM which was coagulated. Patient had CT enterography with no GI problems  Hypertension, HCTZ, metoprolol   Hyperlipidemia on statin; last labs 2015 WNL.  Hypothyroid post surgical; on replacement therapy  Osteoporosis; side effects with the alendronate, so she has received Reclast  Reflux, Protonix, has had several EGDs done, first one showing gastritis and hernia.  She notes that her hiatal hernia may act up on occasion.   Depression, Cymbalta, though she notes she is not really been taking it.   RA vs. OA, reports that she was re-evaluated, felt not to be RA, but nonetheless feels better with the plaquenil.  Hands mainly.     Patient Care Team:  Gordo Canela MD as PCP - General (Internal Medicine)    Patient Active Problem List    Diagnosis Date Noted    AVM (arteriovenous malformation) of colon 12/11/2017     Colonoscopy 2016 had AVM which was coagulated.      Pure hypercholesterolemia 12/08/2017    Iron deficiency anemia 12/08/2017     iron deficiency anemia that dates back to her teenage years. She states that she has been anemic most of her adult life. She was followed by Dr. Kristy Ray for the final 3-4 years of Dr. Yu career and then she was followed by Dr. Angie Atkins for the past two years prior to establishing with Dr. Fung in February, 2016. Prior to her initial visits with Dr. Ray, the patient had been seen by a hematologist in [place] in the 1990s because at the time of a thyroidectomy, she was found to be so severely anemic that she required 14 months of injections of iron in order to get her to an acceptable level of hemoglobin and hematocrit prior to her thyroidectomy. During her timeframe of  follow-up with Dr. Ray, she was treated with only oral iron. Over the two years of follow-up with Dr. Atkins through December 2015, she was given approximately eight courses of IV iron believed to be Injectafer.   She was seen in clinic on 02/19/2016 for initial consultation regarding iron deficiency anemia and evaluation of underlying etiology. Laboratory analysis was ordered at that time for reevaluation of iron deficiency along with reevaluation of the presence of previously identified M-protein. She was also referred to Gastroenterology (Dr. Jamie Martinez) for evaluation of GI blood loss as potential underlying etiology of longstanding iron deficiency. Secondary to her identified iron deficiency she received IV iron replacement in the form of Injectafer 750 mg x 2 doses (3/21/2016 and 03/28/2016).   7/2017: Extraordinarily responsive to minimal doses of IV iron. No evidence of significant anemia today. Ferritin is at low limit of normal. Recheck blood work in 3 months and keep follow-up at 6 months.      Monoclonal gammopathy of undetermined significance 12/08/2017     Dr[place] Fung in [place]      Neutrophilic leukocytosis 11/28/2017    Essential hypertension 11/28/2017 12/2017 TTE showed preserved EF with diastolic CHF.      Sepsis secondary to UTI with E coli 11/28/2017     Hospitalization 12/2017  CT of the abdomen and pelvis were performed which was concerning for possible perinephric abscess.  US kidney showed no sign of abscess  Allergic rxn to zosyn.  Did not react to penicillin, so NOT a penicillin allergy.  positive blood culture for Ecoli,source UTI      Osteoporosis 03/14/2017    Asymptomatic cholelithiasis 04/20/2016    Postoperative hypothyroidism 02/12/2015    Gastroesophageal reflux disease without esophagitis 02/12/2015      Upper gastrointestinal endoscopy 04/27/2006 - gastritis, hernia    Upper gastrointestinal endoscopy 04/27/2006    Upper gastrointestinal endoscopy 03/29/2016           PAST MEDICAL HISTORY:  Past Medical History:   Diagnosis Date    Arthritis     Hypertension     Thyroid disease        PAST SURGICAL HISTORY:  Past Surgical History:   Procedure Laterality Date    HAND SURGERY      THYROIDECTOMY      TUBAL LIGATION         SOCIAL HISTORY:  Social History     Social History    Marital status:      Spouse name: N/A    Number of children: N/A    Years of education: N/A     Occupational History    Not on file.     Social History Main Topics    Smoking status: Current Some Day Smoker     Types: Cigarettes    Smokeless tobacco: Never Used    Alcohol use Yes    Drug use: No    Sexual activity: No     Other Topics Concern    Not on file     Social History Narrative    No narrative on file       ALLERGIES AND MEDICATIONS: updated and reviewed.  Review of patient's allergies indicates:   Allergen Reactions    Meperidine     Statins-hmg-coa reductase inhibitors      Flu like symptoms.     Current Outpatient Prescriptions   Medication Sig Dispense Refill    butalbital-acetaminophen-caffeine -40 mg (FIORICET, ESGIC) -40 mg per tablet Take 1 tablet by mouth every 4 (four) hours as needed for Pain.      cefUROXime (CEFTIN) 500 MG tablet Take 1 tablet (500 mg total) by mouth 2 (two) times daily. 28 tablet 0    fish oil-omega-3 fatty acids 300-1,000 mg capsule Take 2 g by mouth once daily.      fluticasone (FLONASE) 50 mcg/actuation nasal spray 1 spray by Each Nare route once daily.      hydroCHLOROthiazide (HYDRODIURIL) 25 MG tablet Take 25 mg by mouth once daily.      hydroxychloroquine (PLAQUENIL) 200 mg tablet Take by mouth once daily.      levothyroxine (SYNTHROID) 88 MCG tablet Take 88 mcg by mouth once daily.      lisinopril (PRINIVIL,ZESTRIL) 20 MG tablet Take 1 tablet (20 mg total) by mouth once daily. 30 tablet 0    metoprolol tartrate (LOPRESSOR) 50 MG tablet Take 50 mg by mouth 2 (two) times daily.      multivitamin capsule Take 1  "capsule by mouth once daily.      ondansetron (ZOFRAN) 8 MG tablet Take by mouth every 8 (eight) hours as needed for Nausea.      pantoprazole (PROTONIX) 40 MG tablet Take 40 mg by mouth once daily.      rosuvastatin (CRESTOR) 40 MG Tab Take 10 mg by mouth every evening.       No current facility-administered medications for this visit.        Review of Systems   Constitutional: Positive for malaise/fatigue. Negative for chills and fever.   Respiratory: Positive for cough and shortness of breath.    Cardiovascular: Negative for chest pain and palpitations.   Gastrointestinal: Positive for heartburn. Negative for abdominal pain, blood in stool, constipation and diarrhea.   Genitourinary: Negative for dysuria, frequency and urgency.       Physical Exam   Vitals:    12/08/17 1340   BP: 110/70   Pulse: 70   Temp: 98 °F (36.7 °C)   SpO2: 99%   Weight: 78.8 kg (173 lb 9.8 oz)   Height: 5' 6.5" (1.689 m)    Body mass index is 27.6 kg/m².  Weight: 78.8 kg (173 lb 9.8 oz)   Height: 5' 6.5" (168.9 cm)     Physical Exam   Constitutional: She is oriented to person, place, and time. She appears well-developed and well-nourished. No distress.   Eyes: EOM are normal.   Cardiovascular: Normal rate, regular rhythm and normal heart sounds.    No murmur heard.  Pulmonary/Chest: Effort normal and breath sounds normal.   Musculoskeletal: Normal range of motion.   Neurological: She is alert and oriented to person, place, and time. Coordination normal.   Skin: Skin is warm and dry.   Psychiatric: She has a normal mood and affect. Her behavior is normal. Thought content normal.     "

## 2017-12-08 ENCOUNTER — OFFICE VISIT (OUTPATIENT)
Dept: FAMILY MEDICINE | Facility: CLINIC | Age: 72
End: 2017-12-08
Payer: MEDICARE

## 2017-12-08 VITALS
BODY MASS INDEX: 27.25 KG/M2 | HEART RATE: 70 BPM | TEMPERATURE: 98 F | WEIGHT: 173.63 LBS | DIASTOLIC BLOOD PRESSURE: 70 MMHG | OXYGEN SATURATION: 99 % | HEIGHT: 67 IN | SYSTOLIC BLOOD PRESSURE: 110 MMHG

## 2017-12-08 DIAGNOSIS — N39.0 SEPSIS SECONDARY TO UTI: Primary | ICD-10-CM

## 2017-12-08 DIAGNOSIS — A41.9 SEPSIS SECONDARY TO UTI: Primary | ICD-10-CM

## 2017-12-08 DIAGNOSIS — K55.20 AVM (ARTERIOVENOUS MALFORMATION) OF COLON: ICD-10-CM

## 2017-12-08 DIAGNOSIS — K21.9 GASTROESOPHAGEAL REFLUX DISEASE WITHOUT ESOPHAGITIS: ICD-10-CM

## 2017-12-08 DIAGNOSIS — I10 ESSENTIAL HYPERTENSION: ICD-10-CM

## 2017-12-08 PROBLEM — D50.9 IRON DEFICIENCY ANEMIA: Status: ACTIVE | Noted: 2017-12-08

## 2017-12-08 PROBLEM — B96.20 BACTEREMIA DUE TO ESCHERICHIA COLI: Status: RESOLVED | Noted: 2017-11-30 | Resolved: 2017-12-08

## 2017-12-08 PROBLEM — D47.2 MONOCLONAL GAMMOPATHY OF UNDETERMINED SIGNIFICANCE: Status: ACTIVE | Noted: 2017-12-08

## 2017-12-08 PROBLEM — R78.81 BACTEREMIA DUE TO ESCHERICHIA COLI: Status: RESOLVED | Noted: 2017-11-30 | Resolved: 2017-12-08

## 2017-12-08 PROBLEM — E07.9 THYROID DISEASE: Status: RESOLVED | Noted: 2017-11-28 | Resolved: 2017-12-08

## 2017-12-08 PROBLEM — R00.0 SINUS TACHYCARDIA: Status: RESOLVED | Noted: 2017-11-29 | Resolved: 2017-12-08

## 2017-12-08 PROBLEM — E78.00 PURE HYPERCHOLESTEROLEMIA: Status: ACTIVE | Noted: 2017-12-08

## 2017-12-08 PROBLEM — N10 PYELONEPHRITIS, ACUTE: Status: RESOLVED | Noted: 2017-11-27 | Resolved: 2017-12-08

## 2017-12-08 PROBLEM — M81.0 OSTEOPOROSIS: Status: ACTIVE | Noted: 2017-03-14

## 2017-12-08 PROCEDURE — 99203 OFFICE O/P NEW LOW 30 MIN: CPT | Mod: S$PBB,,, | Performed by: INTERNAL MEDICINE

## 2017-12-08 PROCEDURE — 99999 PR PBB SHADOW E&M-EST. PATIENT-LVL III: CPT | Mod: PBBFAC,,, | Performed by: INTERNAL MEDICINE

## 2017-12-08 PROCEDURE — 99213 OFFICE O/P EST LOW 20 MIN: CPT | Mod: PBBFAC,PO | Performed by: INTERNAL MEDICINE

## 2017-12-08 NOTE — PATIENT INSTRUCTIONS
FOR YOUR STOMACH - TRY PEPCID INSTEAD OF PROTONIX.  WHEN IT GETS REALLY SEVERE, TAKE PROTONIX AT THAT POINT.  AS SHORT A PERIOD AS POSSIBLE.

## 2017-12-11 PROBLEM — K55.20 AVM (ARTERIOVENOUS MALFORMATION) OF COLON: Status: ACTIVE | Noted: 2017-12-11

## 2018-03-21 DIAGNOSIS — E89.0 POSTOPERATIVE HYPOTHYROIDISM: ICD-10-CM

## 2018-03-21 DIAGNOSIS — E78.00 PURE HYPERCHOLESTEROLEMIA: Primary | ICD-10-CM

## 2018-03-21 DIAGNOSIS — I10 ESSENTIAL HYPERTENSION: ICD-10-CM

## 2018-03-21 RX ORDER — HYDROCHLOROTHIAZIDE 25 MG/1
25 TABLET ORAL DAILY
Qty: 90 TABLET | Refills: 1 | Status: SHIPPED | OUTPATIENT
Start: 2018-03-21 | End: 2018-09-10 | Stop reason: SDUPTHER

## 2018-03-21 RX ORDER — ROSUVASTATIN CALCIUM 40 MG/1
40 TABLET, COATED ORAL NIGHTLY
Qty: 90 TABLET | Refills: 1 | Status: SHIPPED | OUTPATIENT
Start: 2018-03-21 | End: 2018-09-10 | Stop reason: SDUPTHER

## 2018-03-21 RX ORDER — METOPROLOL TARTRATE 50 MG/1
50 TABLET ORAL 2 TIMES DAILY
Qty: 90 TABLET | Refills: 1 | Status: SHIPPED | OUTPATIENT
Start: 2018-03-21 | End: 2018-06-17 | Stop reason: SDUPTHER

## 2018-03-21 RX ORDER — LEVOTHYROXINE SODIUM 88 UG/1
88 TABLET ORAL DAILY
Qty: 90 TABLET | Refills: 1 | Status: SHIPPED | OUTPATIENT
Start: 2018-03-21 | End: 2018-09-10 | Stop reason: SDUPTHER

## 2018-03-21 NOTE — TELEPHONE ENCOUNTER
----- Message from Palomo Tompkins sent at 3/21/2018  9:52 AM CDT -----  Contact: self  Refill: hydroCHLOROthiazide (HYDRODIURIL) 25 MG tablet            levothyroxine (SYNTHROID) 88 MCG tablet            metoprolol tartrate (LOPRESSOR) 50 MG tablet            rosuvastatin (CRESTOR) 40 MG Tab    Send to [place] [address] [address]    Contact pt at [phone].

## 2018-06-17 DIAGNOSIS — E78.00 PURE HYPERCHOLESTEROLEMIA: Primary | ICD-10-CM

## 2018-06-17 DIAGNOSIS — I10 ESSENTIAL HYPERTENSION: ICD-10-CM

## 2018-06-17 RX ORDER — METOPROLOL TARTRATE 50 MG/1
50 TABLET ORAL 2 TIMES DAILY
Qty: 90 TABLET | Refills: 0 | Status: SHIPPED | OUTPATIENT
Start: 2018-06-17 | End: 2018-07-15 | Stop reason: SDUPTHER

## 2018-06-22 DIAGNOSIS — Z11.59 NEED FOR HEPATITIS C SCREENING TEST: ICD-10-CM

## 2018-06-22 DIAGNOSIS — Z12.39 BREAST CANCER SCREENING: ICD-10-CM

## 2018-07-15 DIAGNOSIS — I10 ESSENTIAL HYPERTENSION: ICD-10-CM

## 2018-07-15 RX ORDER — METOPROLOL TARTRATE 50 MG/1
50 TABLET ORAL 2 TIMES DAILY
Qty: 90 TABLET | Refills: 1 | Status: SHIPPED | OUTPATIENT
Start: 2018-07-15 | End: 2018-09-10 | Stop reason: SDUPTHER

## 2018-09-05 ENCOUNTER — TELEPHONE (OUTPATIENT)
Dept: FAMILY MEDICINE | Facility: CLINIC | Age: 73
End: 2018-09-05

## 2018-09-10 ENCOUNTER — OFFICE VISIT (OUTPATIENT)
Dept: FAMILY MEDICINE | Facility: CLINIC | Age: 73
End: 2018-09-10
Payer: MEDICARE

## 2018-09-10 VITALS
HEART RATE: 114 BPM | DIASTOLIC BLOOD PRESSURE: 84 MMHG | WEIGHT: 177.5 LBS | OXYGEN SATURATION: 97 % | SYSTOLIC BLOOD PRESSURE: 126 MMHG | TEMPERATURE: 98 F | BODY MASS INDEX: 27.86 KG/M2 | HEIGHT: 67 IN

## 2018-09-10 DIAGNOSIS — E55.9 VITAMIN D DEFICIENCY: ICD-10-CM

## 2018-09-10 DIAGNOSIS — D50.0 IRON DEFICIENCY ANEMIA DUE TO CHRONIC BLOOD LOSS: ICD-10-CM

## 2018-09-10 DIAGNOSIS — Z78.0 ASYMPTOMATIC MENOPAUSAL STATE: ICD-10-CM

## 2018-09-10 DIAGNOSIS — Z11.59 NEED FOR HEPATITIS C SCREENING TEST: ICD-10-CM

## 2018-09-10 DIAGNOSIS — E78.00 PURE HYPERCHOLESTEROLEMIA: ICD-10-CM

## 2018-09-10 DIAGNOSIS — Z23 NEEDS FLU SHOT: ICD-10-CM

## 2018-09-10 DIAGNOSIS — Z23 NEED FOR HEPATITIS A VACCINATION: ICD-10-CM

## 2018-09-10 DIAGNOSIS — I10 ESSENTIAL HYPERTENSION: Primary | ICD-10-CM

## 2018-09-10 DIAGNOSIS — Z12.31 ENCOUNTER FOR SCREENING MAMMOGRAM FOR MALIGNANT NEOPLASM OF BREAST: ICD-10-CM

## 2018-09-10 DIAGNOSIS — E89.0 POSTOPERATIVE HYPOTHYROIDISM: ICD-10-CM

## 2018-09-10 PROBLEM — M15.4 EROSIVE OSTEOARTHRITIS: Status: ACTIVE | Noted: 2018-09-10

## 2018-09-10 PROCEDURE — 90662 IIV NO PRSV INCREASED AG IM: CPT | Mod: PBBFAC,PO

## 2018-09-10 PROCEDURE — 90632 HEPA VACCINE ADULT IM: CPT | Mod: PBBFAC,PO

## 2018-09-10 PROCEDURE — 99999 PR PBB SHADOW E&M-EST. PATIENT-LVL III: CPT | Mod: PBBFAC,,, | Performed by: INTERNAL MEDICINE

## 2018-09-10 PROCEDURE — 99213 OFFICE O/P EST LOW 20 MIN: CPT | Mod: PBBFAC,PO | Performed by: INTERNAL MEDICINE

## 2018-09-10 PROCEDURE — 99214 OFFICE O/P EST MOD 30 MIN: CPT | Mod: S$PBB,,, | Performed by: INTERNAL MEDICINE

## 2018-09-10 RX ORDER — HYDROXYCHLOROQUINE SULFATE 200 MG/1
200 TABLET, FILM COATED ORAL DAILY
Qty: 90 TABLET | Refills: 3 | Status: CANCELLED | OUTPATIENT
Start: 2018-09-10

## 2018-09-10 RX ORDER — PANTOPRAZOLE SODIUM 40 MG/1
40 TABLET, DELAYED RELEASE ORAL DAILY
Qty: 90 TABLET | Refills: 3 | Status: SHIPPED | OUTPATIENT
Start: 2018-09-10 | End: 2019-10-19 | Stop reason: SDUPTHER

## 2018-09-10 RX ORDER — HYDROCHLOROTHIAZIDE 25 MG/1
25 TABLET ORAL DAILY
Qty: 90 TABLET | Refills: 1 | Status: SHIPPED | OUTPATIENT
Start: 2018-09-10 | End: 2019-03-13 | Stop reason: SDUPTHER

## 2018-09-10 RX ORDER — ROSUVASTATIN CALCIUM 40 MG/1
40 TABLET, COATED ORAL NIGHTLY
Qty: 90 TABLET | Refills: 1 | Status: SHIPPED | OUTPATIENT
Start: 2018-09-10 | End: 2019-05-13 | Stop reason: SDUPTHER

## 2018-09-10 RX ORDER — METOPROLOL TARTRATE 50 MG/1
50 TABLET ORAL 2 TIMES DAILY
Qty: 90 TABLET | Refills: 1 | Status: SHIPPED | OUTPATIENT
Start: 2018-09-10 | End: 2018-10-08 | Stop reason: SDUPTHER

## 2018-09-10 RX ORDER — LEVOTHYROXINE SODIUM 88 UG/1
88 TABLET ORAL DAILY
Qty: 90 TABLET | Refills: 1 | Status: SHIPPED | OUTPATIENT
Start: 2018-09-10 | End: 2020-07-20 | Stop reason: SDUPTHER

## 2018-09-10 NOTE — PROGRESS NOTES
This note was created by combination of typed  and Dragon dictation.  Transcription errors may be present.  If there are any questions, please contact me.    Assessment / Plan:   Essential hypertension  -stable, refilled metoprolol, HCTZ, form filled out for driving  -     metoprolol tartrate (LOPRESSOR) 50 MG tablet; Take 1 tablet (50 mg total) by mouth 2 (two) times daily.  Dispense: 90 tablet; Refill: 1  -     hydroCHLOROthiazide (HYDRODIURIL) 25 MG tablet; Take 1 tablet (25 mg total) by mouth once daily.  Dispense: 90 tablet; Refill: 1  -     Comprehensive metabolic panel; Future; Expected date: 09/10/2018    Postoperative hypothyroidism  -refilled Synthroid 88, check TSH with upcoming labs  -     levothyroxine (SYNTHROID) 88 MCG tablet; Take 1 tablet (88 mcg total) by mouth once daily.  Dispense: 90 tablet; Refill: 1  -     TSH; Future; Expected date: 09/10/2018    Pure hypercholesterolemia  -check fasting lipid profile, on rosuvastatin 40  -     Lipid panel; Future; Expected date: 09/10/2018  -     rosuvastatin (CRESTOR) 40 MG Tab; Take 1 tablet (40 mg total) by mouth every evening.  Dispense: 90 tablet; Refill: 1    Need for hepatitis C screening test  -     Hepatitis C antibody; Future; Expected date: 09/10/2018    Vitamin D deficiency  -check vitamin-D level, on 400 units she thinks.  -     Vitamin D; Future; Expected date: 09/10/2018    Asymptomatic menopausal state  -ordered bone density scan, she would like to check this after her trip to [facility], perhaps in the new year 2019.  -     DXA Bone Density Spine And Hip; Future; Expected date: 09/10/2018    Iron deficiency anemia due to chronic blood loss  -has not tolerated oral iron, check CBC and iron profile, if necessary she will need to follow up with Hematology about this.  -     CBC auto differential; Future; Expected date: 09/10/2018  -     Iron and TIBC; Future; Expected date: 09/10/2018  -     Ferritin; Future; Expected date:  09/10/2018    Encounter for screening mammogram for malignant neoplasm of breast  -     Mammo Digital Screening Bilateral With CAD; Future; Expected date: 09/10/2018    Needs flu shot  -     Influenza - High Dose (65+) (PF) (IM)    Need for hepatitis A vaccination  -     (In Office Administered) Hepatitis A Vaccine (Adult) (IM)    Other orders  -     pantoprazole (PROTONIX) 40 MG tablet; Take 1 tablet (40 mg total) by mouth once daily.  Dispense: 90 tablet; Refill: 3  -     Cancel: hydroxychloroquine (PLAQUENIL) 200 mg tablet; Take 1 tablet (200 mg total) by mouth once daily.  Dispense: 90 tablet; Refill: 3      She will need to follow up with Rheumatology for her erosive osteoarthritis and refills on her Plaquenil.    Medications Discontinued During This Encounter   Medication Reason    butalbital-acetaminophen-caffeine -40 mg (FIORICET, ESGIC) -40 mg per tablet     ondansetron (ZOFRAN) 8 MG tablet     lisinopril (PRINIVIL,ZESTRIL) 20 MG tablet     metoprolol tartrate (LOPRESSOR) 50 MG tablet Reorder    hydroCHLOROthiazide (HYDRODIURIL) 25 MG tablet Reorder    levothyroxine (SYNTHROID) 88 MCG tablet Reorder    rosuvastatin (CRESTOR) 40 MG Tab Reorder    pantoprazole (PROTONIX) 40 MG tablet Reorder         Current Outpatient Medications:     fish oil-omega-3 fatty acids 300-1,000 mg capsule, Take 2 g by mouth once daily., Disp: , Rfl:     fluticasone (FLONASE) 50 mcg/actuation nasal spray, 1 spray by Each Nare route once daily., Disp: , Rfl:     hydroCHLOROthiazide (HYDRODIURIL) 25 MG tablet, Take 1 tablet (25 mg total) by mouth once daily., Disp: 90 tablet, Rfl: 1    hydroxychloroquine (PLAQUENIL) 200 mg tablet, Take by mouth once daily., Disp: , Rfl:     levothyroxine (SYNTHROID) 88 MCG tablet, Take 1 tablet (88 mcg total) by mouth once daily., Disp: 90 tablet, Rfl: 1    metoprolol tartrate (LOPRESSOR) 50 MG tablet, Take 1 tablet (50 mg total) by mouth 2 (two) times daily., Disp: 90  tablet, Rfl: 1    multivitamin capsule, Take 1 capsule by mouth once daily., Disp: , Rfl:     pantoprazole (PROTONIX) 40 MG tablet, Take 1 tablet (40 mg total) by mouth once daily., Disp: 90 tablet, Rfl: 3    rosuvastatin (CRESTOR) 40 MG Tab, Take 1 tablet (40 mg total) by mouth every evening., Disp: 90 tablet, Rfl: 1      Follow Up: No Follow-up on file. plan for follow up 6 months. Recall entered      Subjective:     Chief Complaint   Patient presents with    Medication Refill       HPI  Fabienne is a 73 y.o. female, last appointment with this clinic was Visit date not found.    No LMP recorded. Patient is postmenopausal.    iron deficiency anemia that responded pretty well to iron infusion in the past.  She also has MGUS and is followed by hematology up there.  Colonoscopy  had AVM which was coagulated. Patient had CT enterography with no GI problems  Hypertension, HCTZ, metoprolol   Hyperlipidemia on statin; last labs  WNL.  Hypothyroid post surgical; on replacement therapy  Osteoporosis; side effects with the alendronate, so she has received Reclast  Reflux, Protonix, has had several EGDs done, first one showing gastritis and hernia.  She notes that her hiatal hernia may act up on occasion.   Depression, Cymbalta, though she notes she is not really been taking it.   Erosive osteoarthritis, reports that she was re-evaluated, felt not to be RA, but nonetheless feels better with the plaquenil.  Hands mainly. Dr. Ugarte    Outside labs 2018, creatinine 1.29, about stable compared to previous  2018, iron sat 14%, CBC normal hemoglobin  Labs elevated kappa light chain    Needs HCV, lipid, TSH    Her grandson  about 2 weeks ago. Was on klonopin, took the medication and possibly took more than rx'd and or took it with EtOH.  Found .  Autopsy - she's unsure the results.  She feels like she has good support system. She declines reefrral to therapist/counseling. It was her son's  son.    Needs meds refilled. She takes a probiotic, manuel red and vitamin D supplement.  Possibly 400 IU    Travel to [place] in October and she would like to postpone DEXA until after the new year.     Due for follow up with rheum.  Currently off the plaquenil and I encouraged her to discuss restarting it with him.     Notes sinus allergies with cough, takes Flonase and claritin PRN.     Needs form filled out for driving - for insurance for work.  No contraindications.     Upcoming trip to [place], will administer hepatitis A #1 today.    Patient Care Team:  Gordo Canela MD as PCP - General (Internal Medicine)  Geronimo Ugarte MD (Rheumatology)  Romulo Fung MD as Consulting Physician (Hematology and Oncology)  Lori Mccarthy (Inactive) (Dermatology)    Patient Active Problem List    Diagnosis Date Noted    Erosive osteoarthritis 09/10/2018    AVM (arteriovenous malformation) of colon 12/11/2017     Colonoscopy 2016 had AVM which was coagulated.      Pure hypercholesterolemia 12/08/2017    Iron deficiency anemia 12/08/2017     iron deficiency anemia that dates back to her teenage years. She states that she has been anemic most of her adult life. She was followed by Dr. Kristy Ray for the final 3-4 years of Dr. Yu career and then she was followed by Dr. Angie Atkins for the past two years prior to establishing with Dr. Fung in February, 2016. Prior to her initial visits with Dr. Ray, the patient had been seen by a hematologist in [place] in the 1990s because at the time of a thyroidectomy, she was found to be so severely anemic that she required 14 months of injections of iron in order to get her to an acceptable level of hemoglobin and hematocrit prior to her thyroidectomy. During her timeframe of follow-up with Dr. Ray, she was treated with only oral iron. Over the two years of follow-up with Dr. Atkins through December 2015, she was given approximately eight courses of IV  iron believed to be Injectafer.   She was seen in clinic on 02/19/2016 for initial consultation regarding iron deficiency anemia and evaluation of underlying etiology. Laboratory analysis was ordered at that time for reevaluation of iron deficiency along with reevaluation of the presence of previously identified M-protein. She was also referred to Gastroenterology (Dr. Jamie Martinez) for evaluation of GI blood loss as potential underlying etiology of longstanding iron deficiency. Secondary to her identified iron deficiency she received IV iron replacement in the form of Injectafer 750 mg x 2 doses (3/21/2016 and 03/28/2016).   7/2017: Extraordinarily responsive to minimal doses of IV iron. No evidence of significant anemia today. Ferritin is at low limit of normal. Recheck blood work in 3 months and keep follow-up at 6 months.      Monoclonal gammopathy of undetermined significance 12/08/2017     Dr[place] Fung in [place]      Neutrophilic leukocytosis 11/28/2017    Essential hypertension 11/28/2017 12/2017 TTE showed preserved EF with diastolic CHF.      Sepsis secondary to UTI with E coli 11/28/2017     Hospitalization 12/2017  CT of the abdomen and pelvis were performed which was concerning for possible perinephric abscess.  US kidney showed no sign of abscess  Allergic rxn to zosyn.  Did not react to penicillin, so NOT a penicillin allergy.  positive blood culture for Ecoli,source UTI      Osteoporosis 03/14/2017    Asymptomatic cholelithiasis 04/20/2016    Postoperative hypothyroidism 02/12/2015    Gastroesophageal reflux disease without esophagitis 02/12/2015      Upper gastrointestinal endoscopy 04/27/2006 - gastritis, hernia    Upper gastrointestinal endoscopy 04/27/2006    Upper gastrointestinal endoscopy 03/29/2016          PAST MEDICAL HISTORY:  Past Medical History:   Diagnosis Date    Arthritis     Hypertension     Thyroid disease        PAST SURGICAL HISTORY:  Past Surgical History:   Procedure  Laterality Date    HAND SURGERY      THYROIDECTOMY      TUBAL LIGATION         SOCIAL HISTORY:  Social History     Socioeconomic History    Marital status:      Spouse name: Not on file    Number of children: Not on file    Years of education: Not on file    Highest education level: Not on file   Social Needs    Financial resource strain: Not on file    Food insecurity - worry: Not on file    Food insecurity - inability: Not on file    Transportation needs - medical: Not on file    Transportation needs - non-medical: Not on file   Occupational History    Not on file   Tobacco Use    Smoking status: Current Some Day Smoker     Types: Cigarettes    Smokeless tobacco: Never Used   Substance and Sexual Activity    Alcohol use: Yes    Drug use: No    Sexual activity: No   Other Topics Concern    Not on file   Social History Narrative    Not on file       ALLERGIES AND MEDICATIONS: updated and reviewed.  Review of patient's allergies indicates:   Allergen Reactions    Meperidine     Statins-hmg-coa reductase inhibitors      Flu like symptoms.     Current Outpatient Medications   Medication Sig Dispense Refill    fish oil-omega-3 fatty acids 300-1,000 mg capsule Take 2 g by mouth once daily.      fluticasone (FLONASE) 50 mcg/actuation nasal spray 1 spray by Each Nare route once daily.      hydroCHLOROthiazide (HYDRODIURIL) 25 MG tablet Take 1 tablet (25 mg total) by mouth once daily. 90 tablet 1    hydroxychloroquine (PLAQUENIL) 200 mg tablet Take by mouth once daily.      levothyroxine (SYNTHROID) 88 MCG tablet Take 1 tablet (88 mcg total) by mouth once daily. 90 tablet 1    metoprolol tartrate (LOPRESSOR) 50 MG tablet TAKE 1 TABLET (50 MG TOTAL) BY MOUTH 2 (TWO) TIMES DAILY. 90 tablet 1    multivitamin capsule Take 1 capsule by mouth once daily.      pantoprazole (PROTONIX) 40 MG tablet Take 40 mg by mouth once daily.      rosuvastatin (CRESTOR) 40 MG Tab Take 1 tablet (40 mg total)  "by mouth every evening. 90 tablet 1     No current facility-administered medications for this visit.        Review of Systems   All other systems reviewed and are negative.      Objective:   Physical Exam   Vitals:    09/10/18 1337   BP: 126/84   Pulse: (!) 114   Temp: 98.4 °F (36.9 °C)   SpO2: 97%   Weight: 80.5 kg (177 lb 7.5 oz)   Height: 5' 6.5" (1.689 m)    Body mass index is 28.22 kg/m².  Weight: 80.5 kg (177 lb 7.5 oz)   Height: 5' 6.5" (168.9 cm)     Physical Exam   Constitutional: She is oriented to person, place, and time. She appears well-developed and well-nourished. No distress.   Eyes: EOM are normal.   Cardiovascular: Normal rate, regular rhythm and normal heart sounds.   No murmur heard.  Pulmonary/Chest: Effort normal and breath sounds normal.   Musculoskeletal: Normal range of motion.   Neurological: She is alert and oriented to person, place, and time. Coordination normal.   Skin: Skin is warm and dry.   Psychiatric: She has a normal mood and affect. Her behavior is normal. Thought content normal.     "

## 2018-09-11 ENCOUNTER — LAB VISIT (OUTPATIENT)
Dept: LAB | Facility: HOSPITAL | Age: 73
End: 2018-09-11
Attending: INTERNAL MEDICINE
Payer: MEDICARE

## 2018-09-11 DIAGNOSIS — D50.0 IRON DEFICIENCY ANEMIA DUE TO CHRONIC BLOOD LOSS: ICD-10-CM

## 2018-09-11 DIAGNOSIS — E89.0 POSTOPERATIVE HYPOTHYROIDISM: ICD-10-CM

## 2018-09-11 DIAGNOSIS — Z11.59 NEED FOR HEPATITIS C SCREENING TEST: ICD-10-CM

## 2018-09-11 DIAGNOSIS — E55.9 VITAMIN D DEFICIENCY: ICD-10-CM

## 2018-09-11 DIAGNOSIS — E78.00 PURE HYPERCHOLESTEROLEMIA: ICD-10-CM

## 2018-09-11 DIAGNOSIS — I10 ESSENTIAL HYPERTENSION: ICD-10-CM

## 2018-09-11 LAB
25(OH)D3+25(OH)D2 SERPL-MCNC: 45 NG/ML
ALBUMIN SERPL BCP-MCNC: 3.8 G/DL
ALP SERPL-CCNC: 58 U/L
ALT SERPL W/O P-5'-P-CCNC: 10 U/L
ANION GAP SERPL CALC-SCNC: 12 MMOL/L
AST SERPL-CCNC: 19 U/L
BASOPHILS # BLD AUTO: 0.04 K/UL
BASOPHILS NFR BLD: 0.7 %
BILIRUB SERPL-MCNC: 0.4 MG/DL
BUN SERPL-MCNC: 16 MG/DL
CALCIUM SERPL-MCNC: 9.5 MG/DL
CHLORIDE SERPL-SCNC: 102 MMOL/L
CHOLEST SERPL-MCNC: 121 MG/DL
CHOLEST/HDLC SERPL: 2.4 {RATIO}
CO2 SERPL-SCNC: 26 MMOL/L
CREAT SERPL-MCNC: 1.2 MG/DL
DIFFERENTIAL METHOD: ABNORMAL
EOSINOPHIL # BLD AUTO: 0.1 K/UL
EOSINOPHIL NFR BLD: 2.2 %
ERYTHROCYTE [DISTWIDTH] IN BLOOD BY AUTOMATED COUNT: 18.3 %
EST. GFR  (AFRICAN AMERICAN): 51.8 ML/MIN/1.73 M^2
EST. GFR  (NON AFRICAN AMERICAN): 44.9 ML/MIN/1.73 M^2
FERRITIN SERPL-MCNC: 2 NG/ML
GLUCOSE SERPL-MCNC: 95 MG/DL
HCT VFR BLD AUTO: 31.1 %
HDLC SERPL-MCNC: 50 MG/DL
HDLC SERPL: 41.3 %
HGB BLD-MCNC: 8.5 G/DL
IMM GRANULOCYTES # BLD AUTO: 0.01 K/UL
IMM GRANULOCYTES NFR BLD AUTO: 0.2 %
IRON SERPL-MCNC: 20 UG/DL
LDLC SERPL CALC-MCNC: 44.2 MG/DL
LYMPHOCYTES # BLD AUTO: 0.4 K/UL
LYMPHOCYTES NFR BLD: 6.9 %
MCH RBC QN AUTO: 20.5 PG
MCHC RBC AUTO-ENTMCNC: 27.3 G/DL
MCV RBC AUTO: 75 FL
MONOCYTES # BLD AUTO: 0.5 K/UL
MONOCYTES NFR BLD: 8.3 %
NEUTROPHILS # BLD AUTO: 4.5 K/UL
NEUTROPHILS NFR BLD: 81.7 %
NONHDLC SERPL-MCNC: 71 MG/DL
NRBC BLD-RTO: 0 /100 WBC
PLATELET # BLD AUTO: 300 K/UL
PMV BLD AUTO: 9.9 FL
POTASSIUM SERPL-SCNC: 3.2 MMOL/L
PROT SERPL-MCNC: 6.7 G/DL
RBC # BLD AUTO: 4.14 M/UL
SATURATED IRON: 4 %
SODIUM SERPL-SCNC: 140 MMOL/L
TOTAL IRON BINDING CAPACITY: 460 UG/DL
TRANSFERRIN SERPL-MCNC: 311 MG/DL
TRIGL SERPL-MCNC: 134 MG/DL
TSH SERPL DL<=0.005 MIU/L-ACNC: 1.87 UIU/ML
WBC # BLD AUTO: 5.51 K/UL

## 2018-09-11 PROCEDURE — 86803 HEPATITIS C AB TEST: CPT

## 2018-09-11 PROCEDURE — 85025 COMPLETE CBC W/AUTO DIFF WBC: CPT

## 2018-09-11 PROCEDURE — 82728 ASSAY OF FERRITIN: CPT

## 2018-09-11 PROCEDURE — 84443 ASSAY THYROID STIM HORMONE: CPT

## 2018-09-11 PROCEDURE — 36415 COLL VENOUS BLD VENIPUNCTURE: CPT | Mod: PO

## 2018-09-11 PROCEDURE — 83540 ASSAY OF IRON: CPT

## 2018-09-11 PROCEDURE — 80061 LIPID PANEL: CPT

## 2018-09-11 PROCEDURE — 82306 VITAMIN D 25 HYDROXY: CPT

## 2018-09-11 PROCEDURE — 80053 COMPREHEN METABOLIC PANEL: CPT

## 2018-09-12 LAB — HCV AB SERPL QL IA: NEGATIVE

## 2018-09-13 NOTE — PROGRESS NOTES
CKD seen previously:  Outside labs 03/26/2018 creatinine 1.29  01/16/2018 creatinine 1.38  7/17/20 17 creatinine 1.25  01/17/2017 creatinine 1.04    Iron and ferritin low, blood count low  Lipid good on crestor 40  Vit D WNL not on supplement  HCV screening negative    Please call pt - her blood count and iron levels are low.  Is she going to go back to her hematologist or does she need one here in the area?  Otherwise no new medications.

## 2018-09-14 ENCOUNTER — TELEPHONE (OUTPATIENT)
Dept: FAMILY MEDICINE | Facility: CLINIC | Age: 73
End: 2018-09-14

## 2018-09-14 DIAGNOSIS — E87.6 HYPOKALEMIA DUE TO LOSS OF POTASSIUM: ICD-10-CM

## 2018-09-14 RX ORDER — POTASSIUM CHLORIDE 20 MEQ/1
20 TABLET, EXTENDED RELEASE ORAL 2 TIMES DAILY
Qty: 60 TABLET | Refills: 5 | Status: SHIPPED | OUTPATIENT
Start: 2018-09-14 | End: 2019-03-18 | Stop reason: SDUPTHER

## 2018-09-14 NOTE — TELEPHONE ENCOUNTER
CKD seen previously:  Outside labs 03/26/2018 creatinine 1.29  01/16/2018 creatinine 1.38  7/17/20 17 creatinine 1.25  01/17/2017 creatinine 1.04    Iron and ferritin low, blood count low  Lipid good on crestor 40  Vit D WNL not on supplement  HCV screening negative    Please call pt:  1. Potassium low - probably due to the HCTZ - start potassium supplement - 20 MEQ (1 tablet) twice a day.  2. Her iron level and blood counts were low - she needs to follow up with hematology - is she going to go back to the one in [facility] or does she need one here?    rx for K Dur sent. Have her set up nonfasting lab visit repeat BMP in 2 weeks.

## 2018-09-20 ENCOUNTER — TELEPHONE (OUTPATIENT)
Dept: FAMILY MEDICINE | Facility: CLINIC | Age: 73
End: 2018-09-20

## 2018-09-20 DIAGNOSIS — D47.2 MONOCLONAL GAMMOPATHY OF UNDETERMINED SIGNIFICANCE: ICD-10-CM

## 2018-09-20 DIAGNOSIS — D50.0 IRON DEFICIENCY ANEMIA DUE TO CHRONIC BLOOD LOSS: Primary | ICD-10-CM

## 2018-09-20 DIAGNOSIS — D72.9 NEUTROPHILIC LEUKOCYTOSIS: ICD-10-CM

## 2018-09-20 NOTE — TELEPHONE ENCOUNTER
----- Message from Sherry Mayer sent at 9/20/2018 10:32 AM CDT -----  Contact: pt            Name of Who is Calling: COURTNEY BISHOP [[id number]]      What is the request in detail: pt needs to speak with a nurse in regards to local Hematologist.. Please advise    Can the clinic reply by [facility]: no      What Number to Call Back if not [facility]: [phone]

## 2018-09-24 ENCOUNTER — TELEPHONE (OUTPATIENT)
Dept: FAMILY MEDICINE | Facility: CLINIC | Age: 73
End: 2018-09-24

## 2018-09-25 NOTE — TELEPHONE ENCOUNTER
Patient cancelled appointment that was previously scheduled with Dr. Coates, Hematology on 10/1/18. Patient states that she seen her hematology in [place] and also had a blood transfusion, she states that she will be scheduling within [facility] in the future.

## 2018-09-26 ENCOUNTER — TELEPHONE (OUTPATIENT)
Dept: FAMILY MEDICINE | Facility: CLINIC | Age: 73
End: 2018-09-26

## 2018-09-26 NOTE — TELEPHONE ENCOUNTER
----- Message from Samina Tamez sent at 9/26/2018  1:04 PM CDT -----  Contact: self  [phone]  Pt wanted top let you know that she does have a lab appt, but she wants you know that she did have a iron transfusion on 9-24-18. So she know her labs results will be totally different. Thanks......Shannon

## 2018-09-27 ENCOUNTER — LAB VISIT (OUTPATIENT)
Dept: LAB | Facility: HOSPITAL | Age: 73
End: 2018-09-27
Attending: INTERNAL MEDICINE
Payer: MEDICARE

## 2018-09-27 DIAGNOSIS — E87.6 HYPOKALEMIA DUE TO LOSS OF POTASSIUM: ICD-10-CM

## 2018-09-27 LAB
ANION GAP SERPL CALC-SCNC: 9 MMOL/L
BUN SERPL-MCNC: 16 MG/DL
CALCIUM SERPL-MCNC: 9.8 MG/DL
CHLORIDE SERPL-SCNC: 107 MMOL/L
CO2 SERPL-SCNC: 25 MMOL/L
CREAT SERPL-MCNC: 1.4 MG/DL
EST. GFR  (AFRICAN AMERICAN): 43 ML/MIN/1.73 M^2
EST. GFR  (NON AFRICAN AMERICAN): 37.3 ML/MIN/1.73 M^2
GLUCOSE SERPL-MCNC: 113 MG/DL
POTASSIUM SERPL-SCNC: 4 MMOL/L
SODIUM SERPL-SCNC: 141 MMOL/L

## 2018-09-27 PROCEDURE — 36415 COLL VENOUS BLD VENIPUNCTURE: CPT | Mod: PO

## 2018-09-27 PROCEDURE — 80048 BASIC METABOLIC PNL TOTAL CA: CPT

## 2018-10-05 NOTE — PROGRESS NOTES
K better on K dur 20 BID. Stay on current dose.   CKD stage 3 has been historically as high as 1.3, 1.4.   Results to pt. No changes.

## 2018-10-08 DIAGNOSIS — I10 ESSENTIAL HYPERTENSION: ICD-10-CM

## 2018-10-08 RX ORDER — METOPROLOL TARTRATE 50 MG/1
50 TABLET ORAL 2 TIMES DAILY
Qty: 90 TABLET | Refills: 1 | Status: SHIPPED | OUTPATIENT
Start: 2018-10-08 | End: 2019-10-20 | Stop reason: SDUPTHER

## 2018-10-08 RX ORDER — METOPROLOL TARTRATE 50 MG/1
50 TABLET ORAL 2 TIMES DAILY
Qty: 90 TABLET | Refills: 1 | Status: SHIPPED | OUTPATIENT
Start: 2018-10-08 | End: 2018-12-06

## 2018-10-08 NOTE — TELEPHONE ENCOUNTER
----- Message from Samina Tamez sent at 10/8/2018  9:59 AM CDT -----  Contact: self  [phone]  Pt is requesting a refill on      metoprolol tartrate (LOPRESSOR) 50 MG tablet    Pt will be going out [place] on [place]10[place]18.          Pls call ....      [id number] - [place] - [address]  [address] [address]  Phone: [phone] Fax: [phone]    Thanks.......Shannon

## 2018-12-05 ENCOUNTER — TELEPHONE (OUTPATIENT)
Dept: FAMILY MEDICINE | Facility: CLINIC | Age: 73
End: 2018-12-05

## 2018-12-05 NOTE — TELEPHONE ENCOUNTER
Attempt to contact patient on number listed in her message and it is asking me for an access code. I attempt to call on home number that is list is her work number. Co-worker gave me number [phone]. Left message on answering machine to return call to clinic.   [facility]  Discharge Note  Short Stay    Admit Date: 1/5/2018    Discharge Date and Time: 1/6/2018 10:58 AM     Attending Physician: No att. providers found     Discharge Provider: Sheba Whitten    Diagnoses:  Active Hospital Problems    Diagnosis  POA    *Status post device closure of ASD [Z98.890, Z87.74]  Not Applicable    ASD (atrial septal defect) [Q21.1]  Not Applicable      Resolved Hospital Problems    Diagnosis Date Resolved POA   No resolved problems to display.       Discharged Condition: good    Hospital Course: Patient was admitted for an outpatient procedure and tolerated the procedure well with no complications.    Final Diagnoses: Same as principal problem.    Disposition: Home or Self Care    Follow up/Patient Instructions:    Medications:  Reconciled Home Medications:   Discharge Medication List as of 1/6/2018 10:24 AM      CONTINUE these medications which have NOT CHANGED    Details   aspirin (ECOTRIN) 81 MG EC tablet Take 81 mg by mouth once daily., Historical Med             Discharge Procedure Orders  Diet Adult Regular     Call MD for:  temperature >100.4     Call MD for:  severe uncontrolled pain     Call MD for:  redness, tenderness, or signs of infection (pain, swelling, redness, odor or green/yellow discharge around incision site)     Call MD for:  persistent dizziness, light-headedness, or visual disturbances     Call MD for:  increased confusion or weakness     No dressing needed       Follow-up Information     Sal Adame MD In 1 month.    Specialty:  Pediatrics  Why:  For wound re-check  Contact information:  [address]  [address]  [facility] [address]  [phone]                   Discharge Procedure Orders (must include Diet, Follow-up, Activity):    Discharge Procedure Orders (must include Diet, Follow-up, Activity)  Diet Adult Regular     Call MD for:  temperature >100.4     Call MD for:  severe uncontrolled pain     Call MD for:   redness, tenderness, or signs of infection (pain, swelling, redness, odor or green/yellow discharge around incision site)     Call MD for:  persistent dizziness, light-headedness, or visual disturbances     Call MD for:  increased confusion or weakness     No dressing needed

## 2018-12-05 NOTE — TELEPHONE ENCOUNTER
----- Message from Frances Winston sent at 12/5/2018 12:27 PM CST -----  Contact: Self/ [phone]  Patient is requesting a surgery monster.  The surgery is scheduled for next Thursday 12/13/18.  Thank  You.

## 2018-12-06 ENCOUNTER — OFFICE VISIT (OUTPATIENT)
Dept: FAMILY MEDICINE | Facility: CLINIC | Age: 73
End: 2018-12-06
Payer: MEDICARE

## 2018-12-06 VITALS
BODY MASS INDEX: 27.56 KG/M2 | SYSTOLIC BLOOD PRESSURE: 122 MMHG | OXYGEN SATURATION: 98 % | WEIGHT: 175.63 LBS | DIASTOLIC BLOOD PRESSURE: 78 MMHG | HEART RATE: 70 BPM | TEMPERATURE: 98 F | HEIGHT: 67 IN

## 2018-12-06 DIAGNOSIS — H26.9 CATARACT OF LEFT EYE, UNSPECIFIED CATARACT TYPE: ICD-10-CM

## 2018-12-06 DIAGNOSIS — Z23 NEED FOR VACCINATION FOR STREP PNEUMONIAE: ICD-10-CM

## 2018-12-06 DIAGNOSIS — E87.6 HYPOKALEMIA DUE TO LOSS OF POTASSIUM: ICD-10-CM

## 2018-12-06 DIAGNOSIS — Z01.818 PREOP EXAMINATION: Primary | ICD-10-CM

## 2018-12-06 DIAGNOSIS — N18.30 CHRONIC KIDNEY DISEASE, STAGE 3: ICD-10-CM

## 2018-12-06 PROBLEM — Z86.19 HISTORY OF GRAM NEGATIVE SEPSIS: Status: ACTIVE | Noted: 2017-11-28

## 2018-12-06 PROCEDURE — 99213 OFFICE O/P EST LOW 20 MIN: CPT | Mod: PBBFAC,PO | Performed by: INTERNAL MEDICINE

## 2018-12-06 PROCEDURE — 90732 PPSV23 VACC 2 YRS+ SUBQ/IM: CPT | Mod: PBBFAC,PO

## 2018-12-06 PROCEDURE — 99999 PR PBB SHADOW E&M-EST. PATIENT-LVL III: CPT | Mod: PBBFAC,,, | Performed by: INTERNAL MEDICINE

## 2018-12-06 PROCEDURE — 99214 OFFICE O/P EST MOD 30 MIN: CPT | Mod: S$PBB,,, | Performed by: INTERNAL MEDICINE

## 2018-12-06 NOTE — PROGRESS NOTES
This note was created by combination of typed  and Dragon dictation.  Transcription errors may be present.  If there are any questions, please contact me.    Assessment / Plan:   Preop examination  Cataract of left eye, unspecified cataract type  -may proceed without further testing.     Chronic kidney disease, stage 3  -recommend avoiding NSAIDS. She finds that tylenol upsets her stomach.  The creatinine bumped after her hospitalization for urosepsis.    Need for vaccination for Strep pneumoniae  -     (In Office Administered) Pneumococcal Polysaccharide Vaccine (23 Valent) (SQ/IM)    Hypokalemia due to loss of potassium  -recommend she continue with BID K Dur    There are no discontinued medications.    meds sent this encounter:       Follow Up: No Follow-up on file.      Subjective:     Chief Complaint   Patient presents with    Pre-op Exam       HPI  Fabienne is a 73 y.o. female, last appointment with this clinic was 9/10/2018.    No LMP recorded. Patient is postmenopausal.    iron deficiency anemia that responded pretty well to iron infusion in the past.    She also has MGUS and is followed by hematology up there.    Colonoscopy 2016 had AVM which was coagulated. Patient had CT enterography with no GI problems  Hypertension, HCTZ, metoprolol   Hyperlipidemia on statin; last labs 2015 WNL.  Hypothyroid post surgical; on replacement therapy  Osteoporosis; side effects with the alendronate, so she has received Reclast  Reflux, Protonix, has had several EGDs done, first one showing gastritis and hernia.  She notes that her hiatal hernia may act up on occasion.   Depression, Cymbalta, though she notes she is not really been taking it.   Erosive osteoarthritis, reports that she was re-evaluated, felt not to be RA, but nonetheless feels better with the plaquenil.  Hands mainly. Dr. Ugarte  CKD stage 3.  Really after hospitalization for urosepsis.    Upcoming left eye cataract surgery.  Timbo Greene  12/13/2018. Left eye cataract.   Had iron infusions x 2 this fall.  Reduced potassium. To one a day. Her heme office instructed her to lower it.  K was 4.0 on labs with [facility].  I would continue the potassium at 2 BID.  Went to [place] after the infusion. Had a good trip.  She notes afternoon fatigue.  Snoring.  She does not want to pursue sleep testing.  Takes an MVI.    She'll follow up with gyn re: mammo and DXA. [facility]    Patient Care Team:  Gordo Canela MD as PCP - General (Internal Medicine)  Romulo Fung MD as Consulting Physician (Hematology and Oncology)    Patient Active Problem List    Diagnosis Date Noted    Hypokalemia due to loss of potassium 09/14/2018    Erosive osteoarthritis 09/10/2018    AVM (arteriovenous malformation) of colon 12/11/2017     Colonoscopy 2016 had AVM which was coagulated.      Pure hypercholesterolemia 12/08/2017    Iron deficiency anemia 12/08/2017     iron deficiency anemia that dates back to her teenage years. She states that she has been anemic most of her adult life. She was followed by Dr. Kristy Ray for the final 3-4 years of Dr. uY career and then she was followed by Dr. Angie Atkins for the past two years prior to establishing with Dr. Fung in February, 2016. Prior to her initial visits with Dr. Ray, the patient had been seen by a hematologist in [place] in the 1990s because at the time of a thyroidectomy, she was found to be so severely anemic that she required 14 months of injections of iron in order to get her to an acceptable level of hemoglobin and hematocrit prior to her thyroidectomy. During her timeframe of follow-up with Dr. Ray, she was treated with only oral iron. Over the two years of follow-up with Dr. Atkins through December 2015, she was given approximately eight courses of IV iron believed to be Injectafer.   She was seen in clinic on 02/19/2016 for initial consultation regarding iron deficiency  anemia and evaluation of underlying etiology. Laboratory analysis was ordered at that time for reevaluation of iron deficiency along with reevaluation of the presence of previously identified M-protein. She was also referred to Gastroenterology (Dr. Jamie Martinez) for evaluation of GI blood loss as potential underlying etiology of longstanding iron deficiency. Secondary to her identified iron deficiency she received IV iron replacement in the form of Injectafer 750 mg x 2 doses (3/21/2016 and 03/28/2016).   7/2017: Extraordinarily responsive to minimal doses of IV iron. No evidence of significant anemia today. Ferritin is at low limit of normal. Recheck blood work in 3 months and keep follow-up at 6 months.      Monoclonal gammopathy of undetermined significance 12/08/2017     Dr. Fung in [place]      Neutrophilic leukocytosis 11/28/2017    Essential hypertension 11/28/2017 12/2017 TTE showed preserved EF with diastolic CHF.      History of gram negative sepsis urosepsis 11/2017 11/28/2017     Hospitalization 12/2017  CT of the abdomen and pelvis were performed which was concerning for possible perinephric abscess.  US kidney showed no sign of abscess  Allergic rxn to zosyn.  Did not react to penicillin, so NOT a penicillin allergy.  positive blood culture for Ecoli,source UTI      Osteoporosis 03/14/2017    Asymptomatic cholelithiasis 04/20/2016    Postoperative hypothyroidism 02/12/2015    Gastroesophageal reflux disease without esophagitis 02/12/2015      Upper gastrointestinal endoscopy 04/27/2006 - gastritis, hernia    Upper gastrointestinal endoscopy 04/27/2006    Upper gastrointestinal endoscopy 03/29/2016          PAST MEDICAL HISTORY:  Past Medical History:   Diagnosis Date    Arthritis     Hypertension     Thyroid disease        PAST SURGICAL HISTORY:  Past Surgical History:   Procedure Laterality Date    HAND SURGERY      THYROIDECTOMY      TUBAL LIGATION         SOCIAL HISTORY:  Social  History     Socioeconomic History    Marital status:      Spouse name: Not on file    Number of children: Not on file    Years of education: Not on file    Highest education level: Not on file   Social Needs    Financial resource strain: Not on file    Food insecurity - worry: Not on file    Food insecurity - inability: Not on file    Transportation needs - medical: Not on file    Transportation needs - non-medical: Not on file   Occupational History    Not on file   Tobacco Use    Smoking status: Current Some Day Smoker     Types: Cigarettes    Smokeless tobacco: Never Used   Substance and Sexual Activity    Alcohol use: Yes    Drug use: No    Sexual activity: No   Other Topics Concern    Not on file   Social History Narrative    Not on file       ALLERGIES AND MEDICATIONS: updated and reviewed.  Review of patient's allergies indicates:   Allergen Reactions    Meperidine     Statins-hmg-coa reductase inhibitors      Flu like symptoms.     Current Outpatient Medications   Medication Sig Dispense Refill    fish oil-omega-3 fatty acids 300-1,000 mg capsule Take 2 g by mouth once daily.      fluticasone (FLONASE) 50 mcg/actuation nasal spray 1 spray by Each Nare route once daily.      hydroCHLOROthiazide (HYDRODIURIL) 25 MG tablet Take 1 tablet (25 mg total) by mouth once daily. 90 tablet 1    hydroxychloroquine (PLAQUENIL) 200 mg tablet Take by mouth once daily.      levothyroxine (SYNTHROID) 88 MCG tablet Take 1 tablet (88 mcg total) by mouth once daily. 90 tablet 1    metoprolol tartrate (LOPRESSOR) 50 MG tablet TAKE 1 TABLET (50 MG TOTAL) BY MOUTH 2 (TWO) TIMES DAILY. 90 tablet 1    multivitamin capsule Take 1 capsule by mouth once daily.      pantoprazole (PROTONIX) 40 MG tablet Take 1 tablet (40 mg total) by mouth once daily. 90 tablet 3    potassium chloride SA (K-DUR,KLOR-CON) 20 MEQ tablet Take 1 tablet (20 mEq total) by mouth 2 (two) times daily. 60 tablet 5    rosuvastatin  "(CRESTOR) 40 MG Tab Take 1 tablet (40 mg total) by mouth every evening. 90 tablet 1     No current facility-administered medications for this visit.        Review of Systems   Constitutional: Negative for chills and fever.   Respiratory: Negative for cough and shortness of breath.    Cardiovascular: Negative for chest pain and palpitations.       Objective:   Physical Exam   Vitals:    12/06/18 1435   BP: 122/78   Pulse: 70   Temp: 98 °F (36.7 °C)   SpO2: 98%   Weight: 79.6 kg (175 lb 9.5 oz)   Height: 5' 6.5" (1.689 m)    Body mass index is 27.92 kg/m².            Physical Exam   Constitutional: She is oriented to person, place, and time. She appears well-developed and well-nourished. No distress.   Eyes: EOM are normal.   Cardiovascular: Normal rate, regular rhythm and normal heart sounds.   No murmur heard.  Pulmonary/Chest: Effort normal and breath sounds normal.   Musculoskeletal: Normal range of motion. She exhibits no edema.   Neurological: She is alert and oriented to person, place, and time. Coordination normal.   Skin: Skin is warm and dry.   Psychiatric: She has a normal mood and affect. Her behavior is normal. Thought content normal.     "

## 2018-12-06 NOTE — TELEPHONE ENCOUNTER
Spoke with patient and schedule preop for cataract surgery to the left eye for surgery date 12/13/18.

## 2019-01-24 ENCOUNTER — TELEPHONE (OUTPATIENT)
Dept: FAMILY MEDICINE | Facility: CLINIC | Age: 74
End: 2019-01-24

## 2019-01-24 NOTE — PROGRESS NOTES
This note was created by combination of typed  and Dragon dictation.  Transcription errors may be present.  If there are any questions, please contact me.    Assessment / Plan:   Preop examination  Cataract of right eye, unspecified cataract type  -may proceed without further testing. Asked her to double check with ophtho to make sure he's aware that she is taking plaquenil.    Chronic kidney disease, stage 3    On plaquenil - she gets annual routine eye exams with [facility], asked her to remind him she's on plaquenil.    There are no discontinued medications.    meds sent this encounter:       Follow Up: No Follow-up on file.      Subjective:     Chief Complaint   Patient presents with    Pre-op Exam     cataract surgery       HPI  Fabienne is a 73 y.o. female, last appointment with this clinic was 12/6/2018.    No LMP recorded. Patient is postmenopausal.    iron deficiency anemia that responded pretty well to iron infusion in the past.    She also has MGUS and is followed by hematology up there.    Colonoscopy 2016 had AVM which was coagulated. Patient had CT enterography with no GI problems  Hypertension, HCTZ, metoprolol   Hyperlipidemia on statin; last labs 2015 WNL.  Hypothyroid post surgical; on replacement therapy  Osteoporosis; side effects with the alendronate, so she has received Reclast  Reflux, Protonix, has had several EGDs done, first one showing gastritis and hernia.  She notes that her hiatal hernia may act up on occasion.   Depression, Cymbalta, though she notes she is not really been taking it.   Erosive osteoarthritis, reports that she was re-evaluated, felt not to be RA, but nonetheless feels better with the plaquenil.  Hands mainly. Dr. Ugarte  CKD stage 3.  Really after hospitalization for urosepsis.    Getting the right eye cataract removed.  Needs a preop b/c it's been > 2 weeks since last preop. The left eye doing well but still needs glasses for near vision.     To be done 2/7/19,  Dr. Greene.    Looking at preop forms - [facility]/his staff filled out PMHx and meds, but does not include plaquenil.  Pt thinks he's aware of that but asked her to reinforce with him that she is taking plaquenil.    Patient Care Team:  Gordo Canela MD as PCP - General (Internal Medicine)  Geronimo Ugarte MD (Rheumatology)  Romulo Fung MD as Consulting Physician (Hematology and Oncology)  Lori Mccarthy (Inactive) (Dermatology)  Timbo Greene MD as Consulting Physician (Ophthalmology)  Jesús Berkowitz (Obstetrics and Gynecology)    Patient Active Problem List    Diagnosis Date Noted    Chronic kidney disease, stage 3 12/06/2018    Cataract of left eye 12/06/2018    Hypokalemia due to loss of potassium 09/14/2018    Erosive osteoarthritis 09/10/2018    AVM (arteriovenous malformation) of colon colonoscopy 2016 12/11/2017     Colonoscopy 2016 had AVM which was coagulated.      Pure hypercholesterolemia 12/08/2017    Iron deficiency anemia 12/08/2017     iron deficiency anemia that dates back to her teenage years. She states that she has been anemic most of her adult life. She was followed by Dr. Kristy Ray for the final 3-4 years of Dr. Yu career and then she was followed by Dr. Angie Atkins for the past two years prior to establishing with Dr. Fung in February, 2016. Prior to her initial visits with Dr. Ray, the patient had been seen by a hematologist in [place] in the 1990s because at the time of a thyroidectomy, she was found to be so severely anemic that she required 14 months of injections of iron in order to get her to an acceptable level of hemoglobin and hematocrit prior to her thyroidectomy. During her timeframe of follow-up with Dr. Ray, she was treated with only oral iron. Over the two years of follow-up with Dr. Atkins through December 2015, she was given approximately eight courses of IV iron believed to be Injectafer.   She was seen in clinic on  02/19/2016 for initial consultation regarding iron deficiency anemia and evaluation of underlying etiology. Laboratory analysis was ordered at that time for reevaluation of iron deficiency along with reevaluation of the presence of previously identified M-protein. She was also referred to Gastroenterology (Dr. Jamie Martinez) for evaluation of GI blood loss as potential underlying etiology of longstanding iron deficiency. Secondary to her identified iron deficiency she received IV iron replacement in the form of Injectafer 750 mg x 2 doses (3/21/2016 and 03/28/2016).   7/2017: Extraordinarily responsive to minimal doses of IV iron. No evidence of significant anemia today. Ferritin is at low limit of normal. Recheck blood work in 3 months and keep follow-up at 6 months.      Monoclonal gammopathy of undetermined significance 12/08/2017     Dr. Fung in [place]      Neutrophilic leukocytosis 11/28/2017    Essential hypertension 11/28/2017 12/2017 TTE showed preserved EF with diastolic CHF.      History of gram negative sepsis urosepsis 11/2017 11/28/2017     Hospitalization 12/2017  CT of the abdomen and pelvis were performed which was concerning for possible perinephric abscess.  US kidney showed no sign of abscess  Allergic rxn to zosyn.  Did not react to penicillin, so NOT a penicillin allergy.  positive blood culture for Ecoli,source UTI      Osteoporosis 03/14/2017    Asymptomatic cholelithiasis 04/20/2016    Postoperative hypothyroidism 02/12/2015    Gastroesophageal reflux disease without esophagitis 02/12/2015      Upper gastrointestinal endoscopy 04/27/2006 - gastritis, hernia    Upper gastrointestinal endoscopy 04/27/2006    Upper gastrointestinal endoscopy 03/29/2016          PAST MEDICAL HISTORY:  Past Medical History:   Diagnosis Date    Arthritis     Hypertension     Thyroid disease        PAST SURGICAL HISTORY:  Past Surgical History:   Procedure Laterality Date    HAND SURGERY       THYROIDECTOMY      TUBAL LIGATION         SOCIAL HISTORY:  Social History     Socioeconomic History    Marital status:      Spouse name: Not on file    Number of children: Not on file    Years of education: Not on file    Highest education level: Not on file   Social Needs    Financial resource strain: Not on file    Food insecurity - worry: Not on file    Food insecurity - inability: Not on file    Transportation needs - medical: Not on file    Transportation needs - non-medical: Not on file   Occupational History    Not on file   Tobacco Use    Smoking status: Current Some Day Smoker     Types: Cigarettes    Smokeless tobacco: Never Used   Substance and Sexual Activity    Alcohol use: Yes    Drug use: No    Sexual activity: No   Other Topics Concern    Not on file   Social History Narrative    Not on file       ALLERGIES AND MEDICATIONS: updated and reviewed.  Review of patient's allergies indicates:   Allergen Reactions    Meperidine     Statins-hmg-coa reductase inhibitors      Flu like symptoms.     Current Outpatient Medications   Medication Sig Dispense Refill    fish oil-omega-3 fatty acids 300-1,000 mg capsule Take 2 g by mouth once daily.      hydroCHLOROthiazide (HYDRODIURIL) 25 MG tablet Take 1 tablet (25 mg total) by mouth once daily. 90 tablet 1    levothyroxine (SYNTHROID) 88 MCG tablet Take 1 tablet (88 mcg total) by mouth once daily. 90 tablet 1    metoprolol tartrate (LOPRESSOR) 50 MG tablet TAKE 1 TABLET (50 MG TOTAL) BY MOUTH 2 (TWO) TIMES DAILY. 90 tablet 1    multivitamin capsule Take 1 capsule by mouth once daily.      pantoprazole (PROTONIX) 40 MG tablet Take 1 tablet (40 mg total) by mouth once daily. 90 tablet 3    potassium chloride SA (K-DUR,KLOR-CON) 20 MEQ tablet Take 1 tablet (20 mEq total) by mouth 2 (two) times daily. 60 tablet 5    rosuvastatin (CRESTOR) 40 MG Tab Take 1 tablet (40 mg total) by mouth every evening. 90 tablet 1    fluticasone  "(FLONASE) 50 mcg/actuation nasal spray 1 spray by Each Nare route once daily.      hydroxychloroquine (PLAQUENIL) 200 mg tablet Take by mouth once daily.       No current facility-administered medications for this visit.        Review of Systems   Constitutional: Negative for chills and fever.   Respiratory: Negative for shortness of breath.    Cardiovascular: Negative for chest pain and palpitations.   Neurological: Negative for dizziness, sensory change, speech change, focal weakness and headaches.       Objective:   Physical Exam   Vitals:    01/25/19 0756   BP: 114/70   Pulse: 63   Temp: 97.6 °F (36.4 °C)   TempSrc: Oral   SpO2: 96%   Weight: 80.3 kg (176 lb 14.7 oz)   Height: 5' 6.5" (1.689 m)    Body mass index is 28.13 kg/m².  Weight: 80.3 kg (176 lb 14.7 oz)   Height: 5' 6.5" (168.9 cm)     Physical Exam   Constitutional: She is oriented to person, place, and time. She appears well-developed and well-nourished. No distress.   Eyes: EOM are normal.   Cardiovascular: Normal rate, regular rhythm and normal heart sounds.   No murmur heard.  Pulmonary/Chest: Effort normal and breath sounds normal.   Musculoskeletal: Normal range of motion. She exhibits no edema.   Neurological: She is alert and oriented to person, place, and time. Coordination normal.   Skin: Skin is warm and dry.   Psychiatric: She has a normal mood and affect. Her behavior is normal. Thought content normal.     "

## 2019-01-24 NOTE — TELEPHONE ENCOUNTER
----- Message from Samina Tamez sent at 1/24/2019  9:50 AM CST -----  Contact: self  [phone]  Pt is requesting a clearance for surgery appt. Pls call pt [phone]. Thanks........Shannon

## 2019-01-25 ENCOUNTER — OFFICE VISIT (OUTPATIENT)
Dept: FAMILY MEDICINE | Facility: CLINIC | Age: 74
End: 2019-01-25
Payer: MEDICARE

## 2019-01-25 VITALS
TEMPERATURE: 98 F | HEIGHT: 67 IN | DIASTOLIC BLOOD PRESSURE: 70 MMHG | BODY MASS INDEX: 27.77 KG/M2 | WEIGHT: 176.94 LBS | OXYGEN SATURATION: 96 % | SYSTOLIC BLOOD PRESSURE: 114 MMHG | HEART RATE: 63 BPM

## 2019-01-25 DIAGNOSIS — N18.30 CHRONIC KIDNEY DISEASE, STAGE 3: ICD-10-CM

## 2019-01-25 DIAGNOSIS — Z01.818 PREOP EXAMINATION: Primary | ICD-10-CM

## 2019-01-25 DIAGNOSIS — H26.9 CATARACT OF RIGHT EYE, UNSPECIFIED CATARACT TYPE: ICD-10-CM

## 2019-01-25 PROCEDURE — 99999 PR PBB SHADOW E&M-EST. PATIENT-LVL III: CPT | Mod: PBBFAC,,, | Performed by: INTERNAL MEDICINE

## 2019-01-25 PROCEDURE — 99213 OFFICE O/P EST LOW 20 MIN: CPT | Mod: PBBFAC,PO | Performed by: INTERNAL MEDICINE

## 2019-01-25 PROCEDURE — 99213 PR OFFICE/OUTPT VISIT, EST, LEVL III, 20-29 MIN: ICD-10-PCS | Mod: S$PBB,,, | Performed by: INTERNAL MEDICINE

## 2019-01-25 PROCEDURE — 99999 PR PBB SHADOW E&M-EST. PATIENT-LVL III: ICD-10-PCS | Mod: PBBFAC,,, | Performed by: INTERNAL MEDICINE

## 2019-01-25 PROCEDURE — 99213 OFFICE O/P EST LOW 20 MIN: CPT | Mod: S$PBB,,, | Performed by: INTERNAL MEDICINE

## 2019-03-13 DIAGNOSIS — I10 ESSENTIAL HYPERTENSION: ICD-10-CM

## 2019-03-13 RX ORDER — HYDROCHLOROTHIAZIDE 25 MG/1
TABLET ORAL
Qty: 90 TABLET | Refills: 3 | Status: SHIPPED | OUTPATIENT
Start: 2019-03-13 | End: 2020-03-11

## 2019-03-18 DIAGNOSIS — E87.6 HYPOKALEMIA DUE TO LOSS OF POTASSIUM: ICD-10-CM

## 2019-03-18 RX ORDER — POTASSIUM CHLORIDE 20 MEQ/1
TABLET, EXTENDED RELEASE ORAL
Qty: 60 TABLET | Refills: 5 | Status: SHIPPED | OUTPATIENT
Start: 2019-03-18 | End: 2019-07-29 | Stop reason: SDUPTHER

## 2019-04-10 DIAGNOSIS — I10 ESSENTIAL HYPERTENSION: ICD-10-CM

## 2019-04-10 RX ORDER — METOPROLOL TARTRATE 50 MG/1
TABLET ORAL
Qty: 90 TABLET | Refills: 1 | Status: SHIPPED | OUTPATIENT
Start: 2019-04-10 | End: 2019-07-21 | Stop reason: SDUPTHER

## 2019-05-13 DIAGNOSIS — E78.00 PURE HYPERCHOLESTEROLEMIA: ICD-10-CM

## 2019-05-13 RX ORDER — ROSUVASTATIN CALCIUM 40 MG/1
TABLET, COATED ORAL
Qty: 90 TABLET | Refills: 1 | Status: SHIPPED | OUTPATIENT
Start: 2019-05-13 | End: 2019-11-17 | Stop reason: SDUPTHER

## 2019-06-13 DIAGNOSIS — E89.0 POSTOPERATIVE HYPOTHYROIDISM: ICD-10-CM

## 2019-06-13 RX ORDER — LEVOTHYROXINE SODIUM 88 UG/1
88 TABLET ORAL DAILY
Qty: 90 TABLET | Refills: 2 | Status: SHIPPED | OUTPATIENT
Start: 2019-06-13 | End: 2020-03-11

## 2019-07-19 DIAGNOSIS — Z12.11 COLON CANCER SCREENING: ICD-10-CM

## 2019-07-21 DIAGNOSIS — I10 ESSENTIAL HYPERTENSION: ICD-10-CM

## 2019-07-21 RX ORDER — METOPROLOL TARTRATE 50 MG/1
TABLET ORAL
Qty: 90 TABLET | Refills: 1 | Status: SHIPPED | OUTPATIENT
Start: 2019-07-21 | End: 2019-08-14 | Stop reason: SDUPTHER

## 2019-07-22 ENCOUNTER — TELEPHONE (OUTPATIENT)
Dept: ADMINISTRATIVE | Facility: HOSPITAL | Age: 74
End: 2019-07-22

## 2019-07-22 NOTE — TELEPHONE ENCOUNTER
----- Message from Gordo Canela MD sent at 7/21/2019  9:17 AM CDT -----  Regarding: can we update DXA and colonoscopy?  In care everywhere - she had colonoscopy and DXA done 2016 at our [facility]    Thanks!

## 2019-07-29 DIAGNOSIS — E87.6 HYPOKALEMIA DUE TO LOSS OF POTASSIUM: ICD-10-CM

## 2019-07-29 RX ORDER — POTASSIUM CHLORIDE 20 MEQ/1
TABLET, EXTENDED RELEASE ORAL
Qty: 180 TABLET | Refills: 1 | Status: SHIPPED | OUTPATIENT
Start: 2019-07-29 | End: 2020-02-06

## 2019-08-13 ENCOUNTER — TELEPHONE (OUTPATIENT)
Dept: FAMILY MEDICINE | Facility: CLINIC | Age: 74
End: 2019-08-13

## 2019-08-13 NOTE — TELEPHONE ENCOUNTER
Patient reports that she has been having frequent urination for almost an week now with back pain and pressure when she urinates. Haven't tried taking any medications to help with the symptoms. Patient was scheduled with Dr. Canela's JAIRO Crawley on tomorrow.

## 2019-08-13 NOTE — TELEPHONE ENCOUNTER
----- Message from Radha Cook sent at 8/13/2019  1:35 PM CDT -----  Contact: Patient  Type:  Sooner Appointment Request    Patient is requesting a sooner appointment.  Patient declined first available appointment listed as well as another facility and provider .  Patient will not accept being placed on the waitlist and is requesting a message be sent to doctor.    Name of Caller: Patient     When is the first available appointment? 9/18/2019    Symptoms: Medication refill (possible UTI)    Would the patient rather a call back or a response via [facility] [employer]? Call back     Best Call Back Number: [phone]    Additional Information:

## 2019-08-14 ENCOUNTER — OFFICE VISIT (OUTPATIENT)
Dept: FAMILY MEDICINE | Facility: CLINIC | Age: 74
End: 2019-08-14
Payer: MEDICARE

## 2019-08-14 VITALS
HEART RATE: 82 BPM | OXYGEN SATURATION: 96 % | HEIGHT: 66 IN | WEIGHT: 175 LBS | TEMPERATURE: 99 F | DIASTOLIC BLOOD PRESSURE: 72 MMHG | SYSTOLIC BLOOD PRESSURE: 122 MMHG | BODY MASS INDEX: 28.12 KG/M2

## 2019-08-14 DIAGNOSIS — N18.30 CHRONIC KIDNEY DISEASE, STAGE 3: ICD-10-CM

## 2019-08-14 DIAGNOSIS — R10.2 SUPRAPUBIC PRESSURE: ICD-10-CM

## 2019-08-14 DIAGNOSIS — D47.2 MONOCLONAL GAMMOPATHY OF UNDETERMINED SIGNIFICANCE: ICD-10-CM

## 2019-08-14 DIAGNOSIS — D50.0 IRON DEFICIENCY ANEMIA DUE TO CHRONIC BLOOD LOSS: ICD-10-CM

## 2019-08-14 DIAGNOSIS — E78.00 PURE HYPERCHOLESTEROLEMIA: ICD-10-CM

## 2019-08-14 DIAGNOSIS — I10 ESSENTIAL HYPERTENSION: ICD-10-CM

## 2019-08-14 DIAGNOSIS — E89.0 POSTOPERATIVE HYPOTHYROIDISM: ICD-10-CM

## 2019-08-14 DIAGNOSIS — R35.0 URINARY FREQUENCY: Primary | ICD-10-CM

## 2019-08-14 DIAGNOSIS — Z86.19 HISTORY OF GRAM NEGATIVE SEPSIS: ICD-10-CM

## 2019-08-14 LAB
BILIRUB SERPL-MCNC: ABNORMAL MG/DL
BLOOD URINE, POC: ABNORMAL
COLOR, POC UA: YELLOW
GLUCOSE UR QL STRIP: NORMAL
KETONES UR QL STRIP: ABNORMAL
LEUKOCYTE ESTERASE URINE, POC: ABNORMAL
NITRITE, POC UA: ABNORMAL
PH, POC UA: 6
PROTEIN, POC: ABNORMAL
SPECIFIC GRAVITY, POC UA: 1.01
UROBILINOGEN, POC UA: NORMAL

## 2019-08-14 PROCEDURE — 87086 URINE CULTURE/COLONY COUNT: CPT

## 2019-08-14 PROCEDURE — 99213 PR OFFICE/OUTPT VISIT, EST, LEVL III, 20-29 MIN: ICD-10-PCS | Mod: S$PBB,,, | Performed by: NURSE PRACTITIONER

## 2019-08-14 PROCEDURE — 99214 OFFICE O/P EST MOD 30 MIN: CPT | Mod: PBBFAC,PO | Performed by: NURSE PRACTITIONER

## 2019-08-14 PROCEDURE — 99999 PR PBB SHADOW E&M-EST. PATIENT-LVL IV: ICD-10-PCS | Mod: PBBFAC,,, | Performed by: NURSE PRACTITIONER

## 2019-08-14 PROCEDURE — 99999 PR PBB SHADOW E&M-EST. PATIENT-LVL IV: CPT | Mod: PBBFAC,,, | Performed by: NURSE PRACTITIONER

## 2019-08-14 PROCEDURE — 81002 URINALYSIS NONAUTO W/O SCOPE: CPT | Mod: PBBFAC,PO | Performed by: NURSE PRACTITIONER

## 2019-08-14 PROCEDURE — 99213 OFFICE O/P EST LOW 20 MIN: CPT | Mod: S$PBB,,, | Performed by: NURSE PRACTITIONER

## 2019-08-14 NOTE — PROGRESS NOTES
History of Present Illness   Fabienne Gocnalves is a 74 y.o. woman with medical history as listed below who presents today for evaluation of UTI symptoms x1 week. She reports suprapubic pressure and urinary frequency. She states that she is urinating small amounts of urine with sensation of incomplete emptying. She also reports a low back ache that is not usual for her. She denies dysuria, hematuria, urgency, flank pain, nausea, vomiting, fevers, abdominal pain, dark color to urine, or malodorous urine. She was concerned because she was admitted with urosepsis in 2017, and at that time, her only symptoms were fatigue and altered mental status. She has no confusion, weakness, or fatigue today. She does drink plenty of water. She has not tried OTC medication with symptoms. She has no additional complaints and is otherwise healthy on today's visit.    Past Medical History:   Diagnosis Date    Arthritis     Hypertension     Thyroid disease        Past Surgical History:   Procedure Laterality Date    HAND SURGERY      THYROIDECTOMY      TUBAL LIGATION         Social History     Socioeconomic History    Marital status:      Spouse name: Not on file    Number of children: Not on file    Years of education: Not on file    Highest education level: Not on file   Occupational History    Not on file   Social Needs    Financial resource strain: Not on file    Food insecurity:     Worry: Not on file     Inability: Not on file    Transportation needs:     Medical: Not on file     Non-medical: Not on file   Tobacco Use    Smoking status: Current Some Day Smoker     Types: Cigarettes    Smokeless tobacco: Never Used   Substance and Sexual Activity    Alcohol use: Yes    Drug use: No    Sexual activity: Never   Lifestyle    Physical activity:     Days per week: Not on file     Minutes per session: Not on file    Stress: Not on file   Relationships    Social connections:     Talks on phone: Not on file      "Gets together: Not on file     Attends [employer] service: Not on file     Active member of club or organization: Not on file     Attends meetings of clubs or organizations: Not on file     Relationship status: Not on file   Other Topics Concern    Not on file   Social History Narrative    Not on file       History reviewed. No pertinent family history.    Review of Systems  Review of Systems   Constitutional: Negative for chills and fever.   Gastrointestinal: Negative for abdominal pain, nausea and vomiting.   Genitourinary: Positive for frequency and urgency. Negative for dysuria, flank pain and hematuria.     A complete review of systems was otherwise negative.    Physical Exam  /72   Pulse 82   Temp 98.5 °F (36.9 °C) (Oral)   Ht 5' 6" (1.676 m)   Wt 79.4 kg (175 lb)   SpO2 96%   BMI 28.25 kg/m²   General appearance: alert, appears stated age, cooperative and no distress  Back: symmetric, no curvature. ROM normal. No CVA tenderness.  Lungs: clear to auscultation bilaterally  Heart: regular rate and rhythm, S1, S2 normal, no murmur, click, rub or gallop  Abdomen: soft, non-tender; bowel sounds normal; no masses,  no organomegaly  Extremities: extremities normal, atraumatic, no cyanosis or edema  Pulses: 2+ and symmetric  Skin: Skin color, texture, turgor normal. No rashes or lesions  Neurologic: Grossly normal    Assessment/Plan  Urinary frequency  Urine dipstick positive for trace leukocyte, otherwise unremarkable.  We will send for urine culture and treat if necessary.  Continue with increase in water intake.  Monitor for new or worsening S&S and RTC PRN.   ER precautions discussed, no red flags on exam.  -     POCT urine dipstick without microscope  -     Urine culture    Suprapubic pressure  As above.    History of gram negative sepsis urosepsis 11/2017  As above. No altered mental status noted.    Essential hypertension  The current medical regimen is effective;  continue present plan and " medications.    Pure hypercholesterolemia  The current medical regimen is effective;  continue present plan and medications.    Chronic kidney disease, stage 3  The current medical regimen is effective;  continue present plan and medications.  Stable. Avoid nephrotoxic agents.    Iron deficiency anemia due to chronic blood loss  The current medical regimen is effective;  continue present plan and medications.    Monoclonal gammopathy of undetermined significance  The current medical regimen is effective;  continue present plan and medications.    Postoperative hypothyroidism  The current medical regimen is effective;  continue present plan and medications.    Patient has verbalized understanding and is in agreement with plan of care.    Follow up if symptoms worsen or fail to improve.

## 2019-08-18 LAB
BACTERIA UR CULT: NORMAL
BACTERIA UR CULT: NORMAL

## 2019-08-19 ENCOUNTER — TELEPHONE (OUTPATIENT)
Dept: FAMILY MEDICINE | Facility: CLINIC | Age: 74
End: 2019-08-19

## 2019-10-20 DIAGNOSIS — I10 ESSENTIAL HYPERTENSION: ICD-10-CM

## 2019-10-20 RX ORDER — METOPROLOL TARTRATE 50 MG/1
TABLET ORAL
Qty: 90 TABLET | Refills: 3 | Status: SHIPPED | OUTPATIENT
Start: 2019-10-20 | End: 2020-04-19

## 2019-10-20 RX ORDER — PANTOPRAZOLE SODIUM 40 MG/1
TABLET, DELAYED RELEASE ORAL
Qty: 90 TABLET | Refills: 3 | Status: SHIPPED | OUTPATIENT
Start: 2019-10-20 | End: 2020-07-20 | Stop reason: SDUPTHER

## 2019-11-17 DIAGNOSIS — E78.00 PURE HYPERCHOLESTEROLEMIA: ICD-10-CM

## 2019-11-18 RX ORDER — ROSUVASTATIN CALCIUM 40 MG/1
TABLET, COATED ORAL
Qty: 90 TABLET | Refills: 0 | Status: SHIPPED | OUTPATIENT
Start: 2019-11-18 | End: 2020-03-08

## 2020-01-06 DIAGNOSIS — I10 ESSENTIAL HYPERTENSION: ICD-10-CM

## 2020-02-06 DIAGNOSIS — E87.6 HYPOKALEMIA DUE TO LOSS OF POTASSIUM: ICD-10-CM

## 2020-02-06 RX ORDER — POTASSIUM CHLORIDE 20 MEQ/1
TABLET, EXTENDED RELEASE ORAL
Qty: 180 TABLET | Refills: 1 | Status: SHIPPED | OUTPATIENT
Start: 2020-02-06 | End: 2020-07-20 | Stop reason: SDUPTHER

## 2020-03-08 DIAGNOSIS — E87.6 HYPOKALEMIA DUE TO LOSS OF POTASSIUM: ICD-10-CM

## 2020-03-08 DIAGNOSIS — E78.00 PURE HYPERCHOLESTEROLEMIA: ICD-10-CM

## 2020-03-08 DIAGNOSIS — N18.30 CHRONIC KIDNEY DISEASE, STAGE 3: Primary | ICD-10-CM

## 2020-03-08 DIAGNOSIS — D50.0 IRON DEFICIENCY ANEMIA DUE TO CHRONIC BLOOD LOSS: ICD-10-CM

## 2020-03-08 RX ORDER — ROSUVASTATIN CALCIUM 40 MG/1
TABLET, COATED ORAL
Qty: 90 TABLET | Refills: 0 | Status: SHIPPED | OUTPATIENT
Start: 2020-03-08 | End: 2020-05-18

## 2020-03-09 NOTE — TELEPHONE ENCOUNTER
Left message with  patients family member to call office. Pta. Needs office visit and fasting labs for future refills.

## 2020-03-10 ENCOUNTER — TELEPHONE (OUTPATIENT)
Dept: FAMILY MEDICINE | Facility: CLINIC | Age: 75
End: 2020-03-10

## 2020-03-10 NOTE — TELEPHONE ENCOUNTER
Spoke to patient and she stated good understanding. Patient stated she will call after her iron infusions in two weeks to set up appt.

## 2020-03-10 NOTE — TELEPHONE ENCOUNTER
----- Message from Maria Guadalupe Brice sent at 3/9/2020 11:21 AM CDT -----  Contact: Self  Type:  Patient Returning Call    Who Called:  Self    Who Left Message for Patient: Estephania    Does the patient know what this is regarding?: no    Would the patient rather a call back or a response via [facility]? call  Best Call Back Number: [phone]

## 2020-03-11 DIAGNOSIS — I10 ESSENTIAL HYPERTENSION: ICD-10-CM

## 2020-03-11 DIAGNOSIS — E89.0 POSTOPERATIVE HYPOTHYROIDISM: ICD-10-CM

## 2020-03-11 RX ORDER — LEVOTHYROXINE SODIUM 88 UG/1
88 TABLET ORAL DAILY
Qty: 90 TABLET | Refills: 0 | Status: SHIPPED | OUTPATIENT
Start: 2020-03-11 | End: 2020-06-08

## 2020-03-11 RX ORDER — HYDROCHLOROTHIAZIDE 25 MG/1
TABLET ORAL
Qty: 90 TABLET | Refills: 0 | Status: SHIPPED | OUTPATIENT
Start: 2020-03-11 | End: 2020-06-08

## 2020-04-18 DIAGNOSIS — I10 ESSENTIAL HYPERTENSION: ICD-10-CM

## 2020-04-19 RX ORDER — METOPROLOL TARTRATE 50 MG/1
TABLET ORAL
Qty: 90 TABLET | Refills: 0 | Status: SHIPPED | OUTPATIENT
Start: 2020-04-19 | End: 2020-05-31

## 2020-05-18 DIAGNOSIS — E78.00 PURE HYPERCHOLESTEROLEMIA: ICD-10-CM

## 2020-05-18 RX ORDER — ROSUVASTATIN CALCIUM 40 MG/1
TABLET, COATED ORAL
Qty: 90 TABLET | Refills: 0 | Status: SHIPPED | OUTPATIENT
Start: 2020-05-18 | End: 2020-07-20 | Stop reason: SDUPTHER

## 2020-05-18 NOTE — TELEPHONE ENCOUNTER
Care Due:                  Date            Visit Type   Department     Provider  --------------------------------------------------------------------------------    Last Visit: None Found      None         None Found  Next Visit: None Scheduled  None         None Found                                                            Last  Test          Frequency    Reason                     Performed    Due Date  --------------------------------------------------------------------------------    Office Visit  12 months..  levothyroxine............  Not Found    Overdue    TSH.........  12 months..  levothyroxine............  09- 09-    Powered by [employer]. Reference number: [id number]. 5/18/2020 4:40:20 PM CDT

## 2020-05-30 DIAGNOSIS — I10 ESSENTIAL HYPERTENSION: ICD-10-CM

## 2020-05-31 RX ORDER — METOPROLOL TARTRATE 50 MG/1
TABLET ORAL
Qty: 90 TABLET | Refills: 0 | Status: SHIPPED | OUTPATIENT
Start: 2020-05-31 | End: 2020-07-17

## 2020-07-15 DIAGNOSIS — Z71.89 COMPLEX CARE COORDINATION: ICD-10-CM

## 2020-07-17 ENCOUNTER — LAB VISIT (OUTPATIENT)
Dept: LAB | Facility: HOSPITAL | Age: 75
End: 2020-07-17
Attending: INTERNAL MEDICINE
Payer: MEDICARE

## 2020-07-17 DIAGNOSIS — E87.6 HYPOKALEMIA DUE TO LOSS OF POTASSIUM: ICD-10-CM

## 2020-07-17 DIAGNOSIS — E78.00 PURE HYPERCHOLESTEROLEMIA: ICD-10-CM

## 2020-07-17 DIAGNOSIS — E89.0 POSTOPERATIVE HYPOTHYROIDISM: ICD-10-CM

## 2020-07-17 DIAGNOSIS — D50.0 IRON DEFICIENCY ANEMIA DUE TO CHRONIC BLOOD LOSS: ICD-10-CM

## 2020-07-17 LAB
ALBUMIN SERPL BCP-MCNC: 4.1 G/DL (ref 3.5–5.2)
ALP SERPL-CCNC: 58 U/L (ref 55–135)
ALT SERPL W/O P-5'-P-CCNC: 16 U/L (ref 10–44)
ANION GAP SERPL CALC-SCNC: 7 MMOL/L (ref 8–16)
AST SERPL-CCNC: 17 U/L (ref 10–40)
BASOPHILS # BLD AUTO: 0.05 K/UL (ref 0–0.2)
BASOPHILS NFR BLD: 1.1 % (ref 0–1.9)
BILIRUB SERPL-MCNC: 0.4 MG/DL (ref 0.1–1)
BUN SERPL-MCNC: 23 MG/DL (ref 8–23)
CALCIUM SERPL-MCNC: 9.7 MG/DL (ref 8.7–10.5)
CHLORIDE SERPL-SCNC: 107 MMOL/L (ref 95–110)
CHOLEST SERPL-MCNC: 144 MG/DL (ref 120–199)
CHOLEST/HDLC SERPL: 3.1 {RATIO} (ref 2–5)
CO2 SERPL-SCNC: 28 MMOL/L (ref 23–29)
CREAT SERPL-MCNC: 1.3 MG/DL (ref 0.5–1.4)
DIFFERENTIAL METHOD: ABNORMAL
EOSINOPHIL # BLD AUTO: 0.2 K/UL (ref 0–0.5)
EOSINOPHIL NFR BLD: 3.9 % (ref 0–8)
ERYTHROCYTE [DISTWIDTH] IN BLOOD BY AUTOMATED COUNT: 14.4 % (ref 11.5–14.5)
EST. GFR  (AFRICAN AMERICAN): 46.4 ML/MIN/1.73 M^2
EST. GFR  (NON AFRICAN AMERICAN): 40.2 ML/MIN/1.73 M^2
FERRITIN SERPL-MCNC: 88 NG/ML (ref 20–300)
GLUCOSE SERPL-MCNC: 91 MG/DL (ref 70–110)
HCT VFR BLD AUTO: 42.3 % (ref 37–48.5)
HDLC SERPL-MCNC: 46 MG/DL (ref 40–75)
HDLC SERPL: 31.9 % (ref 20–50)
HGB BLD-MCNC: 13.4 G/DL (ref 12–16)
IMM GRANULOCYTES # BLD AUTO: 0.02 K/UL (ref 0–0.04)
IMM GRANULOCYTES NFR BLD AUTO: 0.4 % (ref 0–0.5)
IRON SERPL-MCNC: 74 UG/DL (ref 30–160)
LDLC SERPL CALC-MCNC: 65.4 MG/DL (ref 63–159)
LYMPHOCYTES # BLD AUTO: 1.6 K/UL (ref 1–4.8)
LYMPHOCYTES NFR BLD: 34.7 % (ref 18–48)
MCH RBC QN AUTO: 29.8 PG (ref 27–31)
MCHC RBC AUTO-ENTMCNC: 31.7 G/DL (ref 32–36)
MCV RBC AUTO: 94 FL (ref 82–98)
MONOCYTES # BLD AUTO: 0.4 K/UL (ref 0.3–1)
MONOCYTES NFR BLD: 8.8 % (ref 4–15)
NEUTROPHILS # BLD AUTO: 2.4 K/UL (ref 1.8–7.7)
NEUTROPHILS NFR BLD: 51.1 % (ref 38–73)
NONHDLC SERPL-MCNC: 98 MG/DL
NRBC BLD-RTO: 0 /100 WBC
PLATELET # BLD AUTO: 254 K/UL (ref 150–350)
PMV BLD AUTO: 11 FL (ref 9.2–12.9)
POTASSIUM SERPL-SCNC: 4 MMOL/L (ref 3.5–5.1)
PROT SERPL-MCNC: 6.8 G/DL (ref 6–8.4)
RBC # BLD AUTO: 4.49 M/UL (ref 4–5.4)
SATURATED IRON: 22 % (ref 20–50)
SODIUM SERPL-SCNC: 142 MMOL/L (ref 136–145)
TOTAL IRON BINDING CAPACITY: 333 UG/DL (ref 250–450)
TRANSFERRIN SERPL-MCNC: 225 MG/DL (ref 200–375)
TRIGL SERPL-MCNC: 163 MG/DL (ref 30–150)
TSH SERPL DL<=0.005 MIU/L-ACNC: 1.09 UIU/ML (ref 0.4–4)
WBC # BLD AUTO: 4.64 K/UL (ref 3.9–12.7)

## 2020-07-17 PROCEDURE — 80061 LIPID PANEL: CPT

## 2020-07-17 PROCEDURE — 84443 ASSAY THYROID STIM HORMONE: CPT

## 2020-07-17 PROCEDURE — 36415 COLL VENOUS BLD VENIPUNCTURE: CPT | Mod: PO

## 2020-07-17 PROCEDURE — 85025 COMPLETE CBC W/AUTO DIFF WBC: CPT

## 2020-07-17 PROCEDURE — 80053 COMPREHEN METABOLIC PANEL: CPT

## 2020-07-17 PROCEDURE — 83540 ASSAY OF IRON: CPT

## 2020-07-17 PROCEDURE — 82728 ASSAY OF FERRITIN: CPT

## 2020-07-19 NOTE — PROGRESS NOTES
This note was created by combination of typed  and M-Modal dictation.  Transcription errors may be present.  If there are any questions, please contact me.    Assessment:     1. Essential hypertension  hydroCHLOROthiazide (HYDRODIURIL) 25 MG tablet    metoprolol tartrate (LOPRESSOR) 50 MG tablet   2. Chronic kidney disease, stage 3  CBC auto differential    Comprehensive metabolic panel   3. Hypokalemia due to loss of potassium  potassium chloride SA (K-DUR,KLOR-CON) 20 MEQ tablet    Comprehensive metabolic panel   4. Pure hypercholesterolemia  rosuvastatin (CRESTOR) 40 MG Tab    Lipid Panel   5. Postoperative hypothyroidism  levothyroxine (SYNTHROID) 88 MCG tablet    TSH   6. Iron deficiency anemia due to chronic blood loss     7. AVM (arteriovenous malformation) of colon colonoscopy 2016     8. Gastroesophageal reflux disease without esophagitis  pantoprazole (PROTONIX) 40 MG tablet   9. Osteoporosis, unspecified osteoporosis type, unspecified pathological fracture presence         Plan:   1, 2, 3. Stable, on HCTZ and metoprolol, refill to pharmacy.  Potassium was good on current regimen, continue potassium 20 mEq BID  4. Lipid OK, TG higher than ideal but overall OK. No change on statin, crestor 40  5. TSH WNl on dose, no change  6, 7, 8. Continue protonix. Stable, followed by GI, last seen 6/2020, rec's at that time stay on PPI  9. Needs to schedule DEXA. On reclast.    Was remotely taking HCQ, not taking currently.  Overall not really complaining of joint pain.        Medications Discontinued During This Encounter   Medication Reason    levothyroxine (SYNTHROID) 88 MCG tablet Duplicate Order    levothyroxine (SYNTHROID) 88 MCG tablet Reorder    pantoprazole (PROTONIX) 40 MG tablet Reorder    potassium chloride SA (K-DUR,KLOR-CON) 20 MEQ tablet Reorder    rosuvastatin (CRESTOR) 40 MG Tab Reorder    hydroCHLOROthiazide (HYDRODIURIL) 25 MG tablet Reorder    metoprolol tartrate (LOPRESSOR) 50 MG  tablet Reorder       meds sent this encounter:  Medications Ordered This Encounter   Medications    hydroCHLOROthiazide (HYDRODIURIL) 25 MG tablet     Sig: Take 1 tablet (25 mg total) by mouth once daily.     Dispense:  90 tablet     Refill:  3     .    levothyroxine (SYNTHROID) 88 MCG tablet     Sig: Take 1 tablet (88 mcg total) by mouth once daily.     Dispense:  90 tablet     Refill:  3    metoprolol tartrate (LOPRESSOR) 50 MG tablet     Sig: Take 1 tablet (50 mg total) by mouth 2 (two) times daily.     Dispense:  90 tablet     Refill:  3     .    pantoprazole (PROTONIX) 40 MG tablet     Sig: Take 1 tablet (40 mg total) by mouth once daily.     Dispense:  90 tablet     Refill:  3    potassium chloride SA (K-DUR,KLOR-CON) 20 MEQ tablet     Sig: Take 1 tablet (20 mEq total) by mouth 2 (two) times daily.     Dispense:  180 tablet     Refill:  3    rosuvastatin (CRESTOR) 40 MG Tab     Sig: Take 1 tablet (40 mg total) by mouth every evening.     Dispense:  90 tablet     Refill:  3       Follow Up: No follow-ups on file. plan for PEx 1 year. previsit labs ordered.      Subjective:     Chief Complaint   Patient presents with    Medication Refill    Hypertension       HPI  Fabienne is a 75 y.o. female, last appointment with this clinic was Visit date not found.    No LMP recorded. Patient is postmenopausal.    She saw rheum 8/2019.  Osteoporosis, reclast. plan was repeat DEXA and re-evaluate - stay on reclast vs alternative agent. erosive OA with hx of HCQ    She saw heme 2/2020.  Iron deficiency anemia with AVM of colon, gets IV iron. MGUS, stable.    She saw GI 6/2020. Stable. c scope repeat 2021. PPI for GERD    previsit labs iron profile good.    Chronic left knee OA.  Reportedly the last resort is knee replacement but she is not ready for that. [facility].     Has not gotten the repeat DEXA yet.     Mother is 100 YO, in [place], nursing home, she is not allowed to go visit her. Had been living with her 80 year  old sister until fracture. So then transferred to NH.    Still working. She and her  in the same business together - offshore oil.    She stopped the HCQ. She took it maybe 6 months she thinks. Has not taken that x years.   Overall her joints are much better over the years.      Occasional smoker. A few cigarettes with alcohol maybe monthly.     Patient Care Team:  Gordo Canela MD as PCP - General (Internal Medicine)  Gordo Canela MD as PCP - MSSP Attributed  Geronimo Ugarte MD (Rheumatology)  Romulo Fung MD as Consulting Physician (Hematology and Oncology)  Lori Mccarthy (Inactive) (Dermatology)  Timbo Greene MD as Consulting Physician (Ophthalmology)  Jesús Berkowitz (Obstetrics and Gynecology)  Keara Hernandez MA as Care Coordinator    Patient Active Problem List    Diagnosis Date Noted    Chronic kidney disease, stage 3 12/06/2018    Cataract of left eye 12/06/2018    Hypokalemia due to loss of potassium 09/14/2018    Erosive osteoarthritis 09/10/2018    AVM (arteriovenous malformation) of colon colonoscopy 2016 12/11/2017     Colonoscopy 2016 had AVM which was coagulated.      Pure hypercholesterolemia 12/08/2017    Iron deficiency anemia 12/08/2017     iron deficiency anemia that dates back to her teenage years. She states that she has been anemic most of her adult life. She was followed by Dr. Kristy Ray for the final 3-4 years of Dr. Yu career and then she was followed by Dr. Angie Atkins for the past two years prior to establishing with Dr. Fung in February, 2016. Prior to her initial visits with Dr. Ray, the patient had been seen by a hematologist in [place] in the 1990s because at the time of a thyroidectomy, she was found to be so severely anemic that she required 14 months of injections of iron in order to get her to an acceptable level of hemoglobin and hematocrit prior to her thyroidectomy. During her timeframe of follow-up with Dr. Ray,  she was treated with only oral iron. Over the two years of follow-up with Dr. Atkins through December 2015, she was given approximately eight courses of IV iron believed to be Injectafer.   She was seen in clinic on 02/19/2016 for initial consultation regarding iron deficiency anemia and evaluation of underlying etiology. Laboratory analysis was ordered at that time for reevaluation of iron deficiency along with reevaluation of the presence of previously identified M-protein. She was also referred to Gastroenterology (Dr. Jamie Martinez) for evaluation of GI blood loss as potential underlying etiology of longstanding iron deficiency. Secondary to her identified iron deficiency she received IV iron replacement in the form of Injectafer 750 mg x 2 doses (3/21/2016 and 03/28/2016).   7/2017: Extraordinarily responsive to minimal doses of IV iron. No evidence of significant anemia today. Ferritin is at low limit of normal. Recheck blood work in 3 months and keep follow-up at 6 months.      Monoclonal gammopathy of undetermined significance 12/08/2017     Dr. Fung in [place]      Neutrophilic leukocytosis 11/28/2017    Essential hypertension 11/28/2017 12/2017 TTE showed preserved EF with diastolic CHF.      History of gram negative sepsis urosepsis 11/2017 11/28/2017     Hospitalization 12/2017  CT of the abdomen and pelvis were performed which was concerning for possible perinephric abscess.  US kidney showed no sign of abscess  Allergic rxn to zosyn.  Did not react to penicillin, so NOT a penicillin allergy.  positive blood culture for Ecoli,source UTI      Osteoporosis 03/14/2017    Asymptomatic cholelithiasis 04/20/2016    Postoperative hypothyroidism 02/12/2015    Gastroesophageal reflux disease without esophagitis 02/12/2015      Upper gastrointestinal endoscopy 04/27/2006 - gastritis, hernia    Upper gastrointestinal endoscopy 04/27/2006    Upper gastrointestinal endoscopy 03/29/2016          PAST  MEDICAL HISTORY:  Past Medical History:   Diagnosis Date    Arthritis     Hypertension     Thyroid disease        PAST SURGICAL HISTORY:  Past Surgical History:   Procedure Laterality Date    HAND SURGERY      THYROIDECTOMY      TUBAL LIGATION         SOCIAL HISTORY:  Social History     Socioeconomic History    Marital status:      Spouse name: Not on file    Number of children: Not on file    Years of education: Not on file    Highest education level: Not on file   Occupational History    Not on file   Social Needs    Financial resource strain: Not on file    Food insecurity     Worry: Not on file     Inability: Not on file    Transportation needs     Medical: Not on file     Non-medical: Not on file   Tobacco Use    Smoking status: Current Some Day Smoker     Types: Cigarettes    Smokeless tobacco: Never Used   Substance and Sexual Activity    Alcohol use: Yes    Drug use: No    Sexual activity: Never   Lifestyle    Physical activity     Days per week: Not on file     Minutes per session: Not on file    Stress: Not on file   Relationships    Social connections     Talks on phone: Not on file     Gets together: Not on file     Attends Episcopalian service: Not on file     Active member of club or organization: Not on file     Attends meetings of clubs or organizations: Not on file     Relationship status: Not on file   Other Topics Concern    Not on file   Social History Narrative    Not on file        ALLERGIES AND MEDICATIONS: updated and reviewed.  Review of patient's allergies indicates:   Allergen Reactions    Meperidine     Statins-hmg-coa reductase inhibitors      Flu like symptoms.     Current Outpatient Medications   Medication Sig Dispense Refill    fish oil-omega-3 fatty acids 300-1,000 mg capsule Take 2 g by mouth once daily.      fluticasone (FLONASE) 50 mcg/actuation nasal spray 1 spray by Each Nare route once daily.      hydroCHLOROthiazide (HYDRODIURIL) 25 MG tablet  "TAKE 1 TABLET BY MOUTH EVERY DAY 90 tablet 0    levothyroxine (SYNTHROID) 88 MCG tablet Take 1 tablet (88 mcg total) by mouth once daily. 90 tablet 1    levothyroxine (SYNTHROID) 88 MCG tablet TAKE 1 TABLET BY MOUTH EVERY DAY 90 tablet 0    metoprolol tartrate (LOPRESSOR) 50 MG tablet TAKE 1 TABLET BY MOUTH TWICE A DAY 90 tablet 0    multivitamin capsule Take 1 capsule by mouth once daily.      pantoprazole (PROTONIX) 40 MG tablet TAKE 1 TABLET BY MOUTH EVERY DAY 90 tablet 3    potassium chloride SA (K-DUR,KLOR-CON) 20 MEQ tablet TAKE 1 TABLET BY MOUTH TWICE A  tablet 1    rosuvastatin (CRESTOR) 40 MG Tab TAKE 1 TABLET BY MOUTH EVERY DAY IN THE EVENING 90 tablet 0     No current facility-administered medications for this visit.        Review of Systems   Constitutional: Negative for fever, malaise/fatigue and weight loss.   HENT: Negative for congestion.    Eyes: Negative for blurred vision and pain.   Respiratory: Negative for shortness of breath and wheezing.    Cardiovascular: Negative for chest pain, palpitations and leg swelling.   Gastrointestinal: Negative for abdominal pain, blood in stool, constipation, diarrhea and heartburn.   Genitourinary: Negative for dysuria, hematuria and urgency.   Musculoskeletal: Positive for joint pain.        Knee pain chronic; followed by [facility].   Neurological: Negative for tingling, focal weakness, weakness and headaches.       Objective:   Physical Exam   Vitals:    07/20/20 1307   Pulse: 72   Temp: 97.7 °F (36.5 °C)   TempSrc: Oral   SpO2: 96%   Weight: 78 kg (172 lb)   Height: 5' 6" (1.676 m)    Body mass index is 27.76 kg/m².  Weight: 78 kg (172 lb)   Height: 5' 6" (167.6 cm)     Physical Exam  Constitutional:       Appearance: She is well-developed.   HENT:      Right Ear: Tympanic membrane, ear canal and external ear normal.      Left Ear: Tympanic membrane, ear canal and external ear normal.   Eyes:      General: No scleral icterus.     " Pupils: Pupils are equal, round, and reactive to light.   Neck:      Musculoskeletal: Neck supple.      Thyroid: No thyromegaly.   Cardiovascular:      Rate and Rhythm: Normal rate and regular rhythm.      Heart sounds: Normal heart sounds. No murmur.   Pulmonary:      Effort: Pulmonary effort is normal.      Breath sounds: Normal breath sounds. No wheezing.   Abdominal:      Palpations: Abdomen is soft. There is no hepatomegaly, splenomegaly or mass.      Tenderness: There is no abdominal tenderness.   Musculoskeletal: Normal range of motion.         General: No deformity.      Right lower leg: No edema.      Left lower leg: No edema.   Lymphadenopathy:      Cervical: No cervical adenopathy.   Skin:     General: Skin is warm and dry.      Findings: No rash.      Comments: On exposed skin   Neurological:      Mental Status: She is alert and oriented to person, place, and time.      Deep Tendon Reflexes: Reflexes are normal and symmetric.   Psychiatric:         Behavior: Behavior normal.         Thought Content: Thought content normal.         Judgment: Judgment normal.         Component      Latest Ref Rng & Units 7/17/2020   WBC      3.90 - 12.70 K/uL 4.64   RBC      4.00 - 5.40 M/uL 4.49   Hemoglobin      12.0 - 16.0 g/dL 13.4   Hematocrit      37.0 - 48.5 % 42.3   MCV      82 - 98 fL 94   MCH      27.0 - 31.0 pg 29.8   MCHC      32.0 - 36.0 g/dL 31.7 (L)   RDW      11.5 - 14.5 % 14.4   Platelets      150 - 350 K/uL 254   MPV      9.2 - 12.9 fL 11.0   Immature Granulocytes      0.0 - 0.5 % 0.4   Gran # (ANC)      1.8 - 7.7 K/uL 2.4   Immature Grans (Abs)      0.00 - 0.04 K/uL 0.02   Lymph #      1.0 - 4.8 K/uL 1.6   Mono #      0.3 - 1.0 K/uL 0.4   Eos #      0.0 - 0.5 K/uL 0.2   Baso #      0.00 - 0.20 K/uL 0.05   nRBC      0 /100 WBC 0   Gran%      38.0 - 73.0 % 51.1   Lymph%      18.0 - 48.0 % 34.7   Mono%      4.0 - 15.0 % 8.8   Eosinophil%      0.0 - 8.0 % 3.9   Basophil%      0.0 - 1.9 % 1.1   Differential  Method       Automated   Sodium      136 - 145 mmol/L 142   Potassium      3.5 - 5.1 mmol/L 4.0   Chloride      95 - 110 mmol/L 107   CO2      23 - 29 mmol/L 28   Glucose      70 - 110 mg/dL 91   BUN, Bld      8 - 23 mg/dL 23   Creatinine      0.5 - 1.4 mg/dL 1.3   Calcium      8.7 - 10.5 mg/dL 9.7   PROTEIN TOTAL      6.0 - 8.4 g/dL 6.8   Albumin      3.5 - 5.2 g/dL 4.1   BILIRUBIN TOTAL      0.1 - 1.0 mg/dL 0.4   Alkaline Phosphatase      55 - 135 U/L 58   AST      10 - 40 U/L 17   ALT      10 - 44 U/L 16   Anion Gap      8 - 16 mmol/L 7 (L)   eGFR if African American      >60 mL/min/1.73 m:2 46.4 (A)   eGFR if non African American      >60 mL/min/1.73 m:2 40.2 (A)   Cholesterol      120 - 199 mg/dL 144   Triglycerides      30 - 150 mg/dL 163 (H)   HDL      40 - 75 mg/dL 46   LDL Cholesterol External      63.0 - 159.0 mg/dL 65.4   Hdl/Cholesterol Ratio      20.0 - 50.0 % 31.9   Total Cholesterol/HDL Ratio      2.0 - 5.0 3.1   Non-HDL Cholesterol      mg/dL 98   Iron      30 - 160 ug/dL 74   Transferrin      200 - 375 mg/dL 225   TIBC      250 - 450 ug/dL 333   Saturated Iron      20 - 50 % 22   Ferritin      20.0 - 300.0 ng/mL 88   TSH      0.400 - 4.000 uIU/mL 1.089

## 2020-07-20 ENCOUNTER — OFFICE VISIT (OUTPATIENT)
Dept: FAMILY MEDICINE | Facility: CLINIC | Age: 75
End: 2020-07-20
Payer: MEDICARE

## 2020-07-20 VITALS
HEART RATE: 72 BPM | OXYGEN SATURATION: 96 % | WEIGHT: 172 LBS | TEMPERATURE: 98 F | BODY MASS INDEX: 27.64 KG/M2 | HEIGHT: 66 IN | DIASTOLIC BLOOD PRESSURE: 74 MMHG | SYSTOLIC BLOOD PRESSURE: 116 MMHG

## 2020-07-20 DIAGNOSIS — E78.00 PURE HYPERCHOLESTEROLEMIA: ICD-10-CM

## 2020-07-20 DIAGNOSIS — E89.0 POSTOPERATIVE HYPOTHYROIDISM: ICD-10-CM

## 2020-07-20 DIAGNOSIS — K55.20 AVM (ARTERIOVENOUS MALFORMATION) OF COLON: ICD-10-CM

## 2020-07-20 DIAGNOSIS — D50.0 IRON DEFICIENCY ANEMIA DUE TO CHRONIC BLOOD LOSS: ICD-10-CM

## 2020-07-20 DIAGNOSIS — I10 ESSENTIAL HYPERTENSION: Primary | ICD-10-CM

## 2020-07-20 DIAGNOSIS — K21.9 GASTROESOPHAGEAL REFLUX DISEASE WITHOUT ESOPHAGITIS: ICD-10-CM

## 2020-07-20 DIAGNOSIS — M81.0 OSTEOPOROSIS, UNSPECIFIED OSTEOPOROSIS TYPE, UNSPECIFIED PATHOLOGICAL FRACTURE PRESENCE: ICD-10-CM

## 2020-07-20 DIAGNOSIS — E87.6 HYPOKALEMIA DUE TO LOSS OF POTASSIUM: ICD-10-CM

## 2020-07-20 DIAGNOSIS — N18.30 CHRONIC KIDNEY DISEASE, STAGE 3: ICD-10-CM

## 2020-07-20 PROCEDURE — 99214 PR OFFICE/OUTPT VISIT, EST, LEVL IV, 30-39 MIN: ICD-10-PCS | Mod: S$PBB,,, | Performed by: INTERNAL MEDICINE

## 2020-07-20 PROCEDURE — 99213 OFFICE O/P EST LOW 20 MIN: CPT | Mod: PBBFAC,PO | Performed by: INTERNAL MEDICINE

## 2020-07-20 PROCEDURE — 99999 PR PBB SHADOW E&M-EST. PATIENT-LVL III: ICD-10-PCS | Mod: PBBFAC,,, | Performed by: INTERNAL MEDICINE

## 2020-07-20 PROCEDURE — 99214 OFFICE O/P EST MOD 30 MIN: CPT | Mod: S$PBB,,, | Performed by: INTERNAL MEDICINE

## 2020-07-20 PROCEDURE — 99999 PR PBB SHADOW E&M-EST. PATIENT-LVL III: CPT | Mod: PBBFAC,,, | Performed by: INTERNAL MEDICINE

## 2020-07-20 RX ORDER — PANTOPRAZOLE SODIUM 40 MG/1
40 TABLET, DELAYED RELEASE ORAL DAILY
Qty: 90 TABLET | Refills: 3 | Status: SHIPPED | OUTPATIENT
Start: 2020-07-20 | End: 2021-01-13 | Stop reason: SDUPTHER

## 2020-07-20 RX ORDER — ROSUVASTATIN CALCIUM 40 MG/1
40 TABLET, COATED ORAL NIGHTLY
Qty: 90 TABLET | Refills: 3 | Status: SHIPPED | OUTPATIENT
Start: 2020-07-20 | End: 2021-01-13 | Stop reason: SDUPTHER

## 2020-07-20 RX ORDER — LEVOTHYROXINE SODIUM 88 UG/1
88 TABLET ORAL DAILY
Qty: 90 TABLET | Refills: 3 | Status: SHIPPED | OUTPATIENT
Start: 2020-07-20 | End: 2021-01-13 | Stop reason: SDUPTHER

## 2020-07-20 RX ORDER — POTASSIUM CHLORIDE 20 MEQ/1
20 TABLET, EXTENDED RELEASE ORAL 2 TIMES DAILY
Qty: 180 TABLET | Refills: 3 | Status: SHIPPED | OUTPATIENT
Start: 2020-07-20 | End: 2021-01-13 | Stop reason: SDUPTHER

## 2020-07-20 RX ORDER — HYDROCHLOROTHIAZIDE 25 MG/1
25 TABLET ORAL DAILY
Qty: 90 TABLET | Refills: 3 | Status: SHIPPED | OUTPATIENT
Start: 2020-07-20 | End: 2021-01-13 | Stop reason: SDUPTHER

## 2020-07-20 RX ORDER — METOPROLOL TARTRATE 50 MG/1
50 TABLET ORAL 2 TIMES DAILY
Qty: 90 TABLET | Refills: 3 | Status: SHIPPED | OUTPATIENT
Start: 2020-07-20 | End: 2020-12-16

## 2021-01-13 ENCOUNTER — OFFICE VISIT (OUTPATIENT)
Dept: FAMILY MEDICINE | Facility: CLINIC | Age: 76
End: 2021-01-13
Payer: MEDICARE

## 2021-01-13 VITALS
HEART RATE: 81 BPM | HEIGHT: 66 IN | OXYGEN SATURATION: 97 % | WEIGHT: 176 LBS | TEMPERATURE: 98 F | SYSTOLIC BLOOD PRESSURE: 126 MMHG | BODY MASS INDEX: 28.28 KG/M2 | DIASTOLIC BLOOD PRESSURE: 80 MMHG

## 2021-01-13 DIAGNOSIS — I10 ESSENTIAL HYPERTENSION: ICD-10-CM

## 2021-01-13 DIAGNOSIS — N18.30 STAGE 3 CHRONIC KIDNEY DISEASE, UNSPECIFIED WHETHER STAGE 3A OR 3B CKD: ICD-10-CM

## 2021-01-13 DIAGNOSIS — K21.9 GASTROESOPHAGEAL REFLUX DISEASE WITHOUT ESOPHAGITIS: ICD-10-CM

## 2021-01-13 DIAGNOSIS — M81.0 OSTEOPOROSIS, UNSPECIFIED OSTEOPOROSIS TYPE, UNSPECIFIED PATHOLOGICAL FRACTURE PRESENCE: ICD-10-CM

## 2021-01-13 DIAGNOSIS — D47.2 MONOCLONAL GAMMOPATHY OF UNDETERMINED SIGNIFICANCE: ICD-10-CM

## 2021-01-13 DIAGNOSIS — E78.00 PURE HYPERCHOLESTEROLEMIA: ICD-10-CM

## 2021-01-13 DIAGNOSIS — Z01.810 PREOP CARDIOVASCULAR EXAM: Primary | ICD-10-CM

## 2021-01-13 DIAGNOSIS — E89.0 POSTOPERATIVE HYPOTHYROIDISM: ICD-10-CM

## 2021-01-13 DIAGNOSIS — M17.12 PRIMARY OSTEOARTHRITIS OF LEFT KNEE: ICD-10-CM

## 2021-01-13 DIAGNOSIS — Z23 NEEDS FLU SHOT: ICD-10-CM

## 2021-01-13 DIAGNOSIS — E87.6 HYPOKALEMIA DUE TO LOSS OF POTASSIUM: ICD-10-CM

## 2021-01-13 DIAGNOSIS — K55.20 AVM (ARTERIOVENOUS MALFORMATION) OF COLON: ICD-10-CM

## 2021-01-13 DIAGNOSIS — D50.0 IRON DEFICIENCY ANEMIA DUE TO CHRONIC BLOOD LOSS: ICD-10-CM

## 2021-01-13 PROCEDURE — 99214 PR OFFICE/OUTPT VISIT, EST, LEVL IV, 30-39 MIN: ICD-10-PCS | Mod: S$PBB,,, | Performed by: INTERNAL MEDICINE

## 2021-01-13 PROCEDURE — 93010 EKG 12-LEAD: ICD-10-PCS | Mod: S$PBB,,, | Performed by: INTERNAL MEDICINE

## 2021-01-13 PROCEDURE — 93005 ELECTROCARDIOGRAM TRACING: CPT | Mod: PBBFAC,PO | Performed by: INTERNAL MEDICINE

## 2021-01-13 PROCEDURE — 99999 PR PBB SHADOW E&M-EST. PATIENT-LVL IV: CPT | Mod: PBBFAC,,, | Performed by: INTERNAL MEDICINE

## 2021-01-13 PROCEDURE — 93010 ELECTROCARDIOGRAM REPORT: CPT | Mod: S$PBB,,, | Performed by: INTERNAL MEDICINE

## 2021-01-13 PROCEDURE — G0008 ADMIN INFLUENZA VIRUS VAC: HCPCS | Mod: PBBFAC,PO

## 2021-01-13 PROCEDURE — 99214 OFFICE O/P EST MOD 30 MIN: CPT | Mod: PBBFAC,PO | Performed by: INTERNAL MEDICINE

## 2021-01-13 PROCEDURE — 99214 OFFICE O/P EST MOD 30 MIN: CPT | Mod: S$PBB,,, | Performed by: INTERNAL MEDICINE

## 2021-01-13 PROCEDURE — 90694 VACC AIIV4 NO PRSRV 0.5ML IM: CPT | Mod: PBBFAC,PO

## 2021-01-13 PROCEDURE — 99999 PR PBB SHADOW E&M-EST. PATIENT-LVL IV: ICD-10-PCS | Mod: PBBFAC,,, | Performed by: INTERNAL MEDICINE

## 2021-01-13 RX ORDER — METOPROLOL TARTRATE 50 MG/1
50 TABLET ORAL 2 TIMES DAILY
Qty: 180 TABLET | Refills: 1 | Status: SHIPPED | OUTPATIENT
Start: 2021-01-13 | End: 2022-01-19

## 2021-01-13 RX ORDER — ROSUVASTATIN CALCIUM 40 MG/1
40 TABLET, COATED ORAL NIGHTLY
Qty: 90 TABLET | Refills: 3 | Status: SHIPPED | OUTPATIENT
Start: 2021-01-13 | End: 2022-01-19

## 2021-01-13 RX ORDER — POTASSIUM CHLORIDE 20 MEQ/1
20 TABLET, EXTENDED RELEASE ORAL 2 TIMES DAILY
Qty: 180 TABLET | Refills: 3 | Status: SHIPPED | OUTPATIENT
Start: 2021-01-13 | End: 2022-01-19

## 2021-01-13 RX ORDER — HYDROCHLOROTHIAZIDE 25 MG/1
25 TABLET ORAL DAILY
Qty: 90 TABLET | Refills: 3 | Status: SHIPPED | OUTPATIENT
Start: 2021-01-13 | End: 2022-01-19

## 2021-01-13 RX ORDER — PANTOPRAZOLE SODIUM 40 MG/1
40 TABLET, DELAYED RELEASE ORAL DAILY
Qty: 90 TABLET | Refills: 3 | Status: SHIPPED | OUTPATIENT
Start: 2021-01-13 | End: 2022-01-19

## 2021-01-13 RX ORDER — LEVOTHYROXINE SODIUM 88 UG/1
88 TABLET ORAL DAILY
Qty: 90 TABLET | Refills: 3 | Status: SHIPPED | OUTPATIENT
Start: 2021-01-13 | End: 2022-01-19

## 2021-01-14 ENCOUNTER — HOSPITAL ENCOUNTER (OUTPATIENT)
Dept: RADIOLOGY | Facility: HOSPITAL | Age: 76
Discharge: HOME | End: 2021-01-14
Attending: INTERNAL MEDICINE
Payer: MEDICARE

## 2021-01-14 DIAGNOSIS — Z01.810 PREOP CARDIOVASCULAR EXAM: ICD-10-CM

## 2021-01-14 PROCEDURE — 71046 X-RAY EXAM CHEST 2 VIEWS: CPT | Mod: 26,,, | Performed by: RADIOLOGY

## 2021-01-14 PROCEDURE — 71046 X-RAY EXAM CHEST 2 VIEWS: CPT | Mod: TC,FY,PO

## 2021-01-14 PROCEDURE — 71046 XR CHEST PA AND LATERAL: ICD-10-PCS | Mod: 26,,, | Performed by: RADIOLOGY

## 2021-01-19 DIAGNOSIS — E87.6 HYPOKALEMIA DUE TO LOSS OF POTASSIUM: Primary | ICD-10-CM

## 2021-01-19 DIAGNOSIS — E78.00 PURE HYPERCHOLESTEROLEMIA: ICD-10-CM

## 2021-01-19 DIAGNOSIS — E89.0 POSTOPERATIVE HYPOTHYROIDISM: ICD-10-CM

## 2021-01-19 DIAGNOSIS — D50.0 IRON DEFICIENCY ANEMIA DUE TO CHRONIC BLOOD LOSS: ICD-10-CM

## 2021-02-11 ENCOUNTER — TELEPHONE (OUTPATIENT)
Dept: FAMILY MEDICINE | Facility: CLINIC | Age: 76
End: 2021-02-11

## 2021-09-23 ENCOUNTER — PES CALL (OUTPATIENT)
Dept: ADMINISTRATIVE | Facility: CLINIC | Age: 76
End: 2021-09-23

## 2021-12-09 ENCOUNTER — PES CALL (OUTPATIENT)
Dept: ADMINISTRATIVE | Facility: CLINIC | Age: 76
End: 2021-12-09
Payer: MEDICARE

## 2022-01-18 DIAGNOSIS — E78.00 PURE HYPERCHOLESTEROLEMIA: ICD-10-CM

## 2022-01-18 DIAGNOSIS — E87.6 HYPOKALEMIA DUE TO LOSS OF POTASSIUM: ICD-10-CM

## 2022-01-18 DIAGNOSIS — I10 ESSENTIAL HYPERTENSION: ICD-10-CM

## 2022-01-18 DIAGNOSIS — K21.9 GASTROESOPHAGEAL REFLUX DISEASE WITHOUT ESOPHAGITIS: ICD-10-CM

## 2022-01-18 DIAGNOSIS — E89.0 POSTOPERATIVE HYPOTHYROIDISM: ICD-10-CM

## 2022-01-19 RX ORDER — PANTOPRAZOLE SODIUM 40 MG/1
TABLET, DELAYED RELEASE ORAL
Qty: 90 TABLET | Refills: 3 | Status: SHIPPED | OUTPATIENT
Start: 2022-01-19 | End: 2022-02-04 | Stop reason: SDUPTHER

## 2022-01-19 RX ORDER — METOPROLOL TARTRATE 50 MG/1
TABLET ORAL
Qty: 180 TABLET | Refills: 3 | Status: SHIPPED | OUTPATIENT
Start: 2022-01-19 | End: 2022-02-04 | Stop reason: SDUPTHER

## 2022-01-19 RX ORDER — ROSUVASTATIN CALCIUM 40 MG/1
TABLET, COATED ORAL
Qty: 90 TABLET | Refills: 3 | Status: SHIPPED | OUTPATIENT
Start: 2022-01-19 | End: 2022-02-04 | Stop reason: SDUPTHER

## 2022-01-19 RX ORDER — HYDROCHLOROTHIAZIDE 25 MG/1
TABLET ORAL
Qty: 90 TABLET | Refills: 3 | Status: SHIPPED | OUTPATIENT
Start: 2022-01-19 | End: 2022-02-04 | Stop reason: SDUPTHER

## 2022-01-19 RX ORDER — LEVOTHYROXINE SODIUM 88 UG/1
TABLET ORAL
Qty: 90 TABLET | Refills: 3 | Status: SHIPPED | OUTPATIENT
Start: 2022-01-19 | End: 2022-02-04 | Stop reason: SDUPTHER

## 2022-01-19 RX ORDER — POTASSIUM CHLORIDE 20 MEQ/1
TABLET, EXTENDED RELEASE ORAL
Qty: 180 TABLET | Refills: 3 | Status: SHIPPED | OUTPATIENT
Start: 2022-01-19 | End: 2023-02-06 | Stop reason: SDUPTHER

## 2022-01-19 NOTE — TELEPHONE ENCOUNTER
Care Due:                  Date            Visit Type   Department     Provider  --------------------------------------------------------------------------------                                             [facility]                                           MED/ INTERNAL  Last Visit: 01-      None         MED/ RUTHANN Canela                                           [facility]                                           MED/ INTERNAL  Next Visit: 02-      None         MED/ RUTHANN Canela                                                            Last  Test          Frequency    Reason                     Performed    Due Date  --------------------------------------------------------------------------------    CMP.........  12 months..  hydroCHLOROthiazide,       Not Found    Overdue                             potassium, rosuvastatin..    Lipid Panel.  12 months..  rosuvastatin.............  Not Found    Overdue    TSH.........  12 months..  levothyroxine............  01- 01-    Powered by [employer]. Reference number: [id number].   1/18/2022 7:10:55 PM CST

## 2022-02-03 PROBLEM — N18.32 STAGE 3B CHRONIC KIDNEY DISEASE: Status: ACTIVE | Noted: 2018-12-06

## 2022-02-04 ENCOUNTER — OFFICE VISIT (OUTPATIENT)
Dept: FAMILY MEDICINE | Facility: CLINIC | Age: 77
End: 2022-02-04
Payer: MEDICARE

## 2022-02-04 ENCOUNTER — LAB VISIT (OUTPATIENT)
Dept: LAB | Facility: HOSPITAL | Age: 77
End: 2022-02-04
Attending: INTERNAL MEDICINE
Payer: MEDICARE

## 2022-02-04 VITALS
HEART RATE: 73 BPM | OXYGEN SATURATION: 97 % | DIASTOLIC BLOOD PRESSURE: 80 MMHG | TEMPERATURE: 98 F | SYSTOLIC BLOOD PRESSURE: 116 MMHG | HEIGHT: 66 IN | BODY MASS INDEX: 28.41 KG/M2

## 2022-02-04 DIAGNOSIS — E87.6 HYPOKALEMIA DUE TO LOSS OF POTASSIUM: ICD-10-CM

## 2022-02-04 DIAGNOSIS — E89.0 POSTOPERATIVE HYPOTHYROIDISM: ICD-10-CM

## 2022-02-04 DIAGNOSIS — D47.2 MONOCLONAL GAMMOPATHY OF UNDETERMINED SIGNIFICANCE: ICD-10-CM

## 2022-02-04 DIAGNOSIS — E78.00 PURE HYPERCHOLESTEROLEMIA: ICD-10-CM

## 2022-02-04 DIAGNOSIS — Z00.00 NORMAL PHYSICAL EXAM: Primary | ICD-10-CM

## 2022-02-04 DIAGNOSIS — N18.32 STAGE 3B CHRONIC KIDNEY DISEASE: ICD-10-CM

## 2022-02-04 DIAGNOSIS — I10 ESSENTIAL HYPERTENSION: ICD-10-CM

## 2022-02-04 DIAGNOSIS — D50.0 IRON DEFICIENCY ANEMIA DUE TO CHRONIC BLOOD LOSS: ICD-10-CM

## 2022-02-04 DIAGNOSIS — K21.9 GASTROESOPHAGEAL REFLUX DISEASE WITHOUT ESOPHAGITIS: ICD-10-CM

## 2022-02-04 DIAGNOSIS — Z23 NEED FOR SHINGLES VACCINE: ICD-10-CM

## 2022-02-04 DIAGNOSIS — Z23 NEEDS FLU SHOT: ICD-10-CM

## 2022-02-04 DIAGNOSIS — Z12.11 SCREENING FOR COLON CANCER: ICD-10-CM

## 2022-02-04 DIAGNOSIS — M81.0 OSTEOPOROSIS, UNSPECIFIED OSTEOPOROSIS TYPE, UNSPECIFIED PATHOLOGICAL FRACTURE PRESENCE: ICD-10-CM

## 2022-02-04 DIAGNOSIS — Z23 FLU VACCINE NEED: ICD-10-CM

## 2022-02-04 LAB
ALBUMIN SERPL BCP-MCNC: 4 G/DL (ref 3.5–5.2)
ALBUMIN SERPL BCP-MCNC: 4 G/DL (ref 3.5–5.2)
ALP SERPL-CCNC: 71 U/L (ref 55–135)
ALT SERPL W/O P-5'-P-CCNC: 26 U/L (ref 10–44)
ANION GAP SERPL CALC-SCNC: 10 MMOL/L (ref 8–16)
AST SERPL-CCNC: 22 U/L (ref 10–40)
BASOPHILS # BLD AUTO: 0.07 K/UL (ref 0–0.2)
BASOPHILS NFR BLD: 1.3 % (ref 0–1.9)
BILIRUB DIRECT SERPL-MCNC: 0.2 MG/DL (ref 0.1–0.3)
BILIRUB SERPL-MCNC: 0.4 MG/DL (ref 0.1–1)
BUN SERPL-MCNC: 21 MG/DL (ref 8–23)
CALCIUM SERPL-MCNC: 10.7 MG/DL (ref 8.7–10.5)
CHLORIDE SERPL-SCNC: 103 MMOL/L (ref 95–110)
CHOLEST SERPL-MCNC: 137 MG/DL (ref 120–199)
CHOLEST/HDLC SERPL: 2.9 {RATIO} (ref 2–5)
CO2 SERPL-SCNC: 27 MMOL/L (ref 23–29)
CREAT SERPL-MCNC: 1.2 MG/DL (ref 0.5–1.4)
DIFFERENTIAL METHOD: ABNORMAL
EOSINOPHIL # BLD AUTO: 0.2 K/UL (ref 0–0.5)
EOSINOPHIL NFR BLD: 3.2 % (ref 0–8)
ERYTHROCYTE [DISTWIDTH] IN BLOOD BY AUTOMATED COUNT: 13.2 % (ref 11.5–14.5)
EST. GFR  (AFRICAN AMERICAN): 50.7 ML/MIN/1.73 M^2
EST. GFR  (NON AFRICAN AMERICAN): 44 ML/MIN/1.73 M^2
GLUCOSE SERPL-MCNC: 83 MG/DL (ref 70–110)
HCT VFR BLD AUTO: 45.7 % (ref 37–48.5)
HDLC SERPL-MCNC: 47 MG/DL (ref 40–75)
HDLC SERPL: 34.3 % (ref 20–50)
HGB BLD-MCNC: 14.6 G/DL (ref 12–16)
IMM GRANULOCYTES # BLD AUTO: 0.01 K/UL (ref 0–0.04)
IMM GRANULOCYTES NFR BLD AUTO: 0.2 % (ref 0–0.5)
LDLC SERPL CALC-MCNC: 52.2 MG/DL (ref 63–159)
LYMPHOCYTES # BLD AUTO: 1.7 K/UL (ref 1–4.8)
LYMPHOCYTES NFR BLD: 31.8 % (ref 18–48)
MCH RBC QN AUTO: 30.2 PG (ref 27–31)
MCHC RBC AUTO-ENTMCNC: 31.9 G/DL (ref 32–36)
MCV RBC AUTO: 95 FL (ref 82–98)
MONOCYTES # BLD AUTO: 0.5 K/UL (ref 0.3–1)
MONOCYTES NFR BLD: 8.7 % (ref 4–15)
NEUTROPHILS # BLD AUTO: 2.9 K/UL (ref 1.8–7.7)
NEUTROPHILS NFR BLD: 54.8 % (ref 38–73)
NONHDLC SERPL-MCNC: 90 MG/DL
NRBC BLD-RTO: 0 /100 WBC
PHOSPHATE SERPL-MCNC: 3.3 MG/DL (ref 2.7–4.5)
PLATELET # BLD AUTO: 257 K/UL (ref 150–450)
PMV BLD AUTO: 10.9 FL (ref 9.2–12.9)
POTASSIUM SERPL-SCNC: 4.2 MMOL/L (ref 3.5–5.1)
PROT SERPL-MCNC: 7 G/DL (ref 6–8.4)
PTH-INTACT SERPL-MCNC: 89.9 PG/ML (ref 9–77)
RBC # BLD AUTO: 4.83 M/UL (ref 4–5.4)
SODIUM SERPL-SCNC: 140 MMOL/L (ref 136–145)
T4 FREE SERPL-MCNC: 1.42 NG/DL (ref 0.71–1.51)
TRIGL SERPL-MCNC: 189 MG/DL (ref 30–150)
TSH SERPL DL<=0.005 MIU/L-ACNC: 0.2 UIU/ML (ref 0.4–4)
WBC # BLD AUTO: 5.28 K/UL (ref 3.9–12.7)

## 2022-02-04 PROCEDURE — G0008 ADMIN INFLUENZA VIRUS VAC: HCPCS | Mod: PBBFAC,PO | Performed by: INTERNAL MEDICINE

## 2022-02-04 PROCEDURE — 84075 ASSAY ALKALINE PHOSPHATASE: CPT | Performed by: INTERNAL MEDICINE

## 2022-02-04 PROCEDURE — 99397 PER PM REEVAL EST PAT 65+ YR: CPT | Mod: S$PBB,,, | Performed by: INTERNAL MEDICINE

## 2022-02-04 PROCEDURE — 99397 PR PREVENTIVE VISIT,EST,65 & OVER: ICD-10-PCS | Mod: S$PBB,,, | Performed by: INTERNAL MEDICINE

## 2022-02-04 PROCEDURE — 99214 OFFICE O/P EST MOD 30 MIN: CPT | Mod: PBBFAC,PO,25 | Performed by: INTERNAL MEDICINE

## 2022-02-04 PROCEDURE — 99999 PR PBB SHADOW E&M-EST. PATIENT-LVL IV: ICD-10-PCS | Mod: PBBFAC,,, | Performed by: INTERNAL MEDICINE

## 2022-02-04 PROCEDURE — 99999 PR PBB SHADOW E&M-EST. PATIENT-LVL IV: CPT | Mod: PBBFAC,,, | Performed by: INTERNAL MEDICINE

## 2022-02-04 PROCEDURE — 80069 RENAL FUNCTION PANEL: CPT | Performed by: INTERNAL MEDICINE

## 2022-02-04 PROCEDURE — 83970 ASSAY OF PARATHORMONE: CPT | Performed by: INTERNAL MEDICINE

## 2022-02-04 PROCEDURE — 84439 ASSAY OF FREE THYROXINE: CPT | Performed by: INTERNAL MEDICINE

## 2022-02-04 PROCEDURE — 84443 ASSAY THYROID STIM HORMONE: CPT | Performed by: INTERNAL MEDICINE

## 2022-02-04 PROCEDURE — 80061 LIPID PANEL: CPT | Performed by: INTERNAL MEDICINE

## 2022-02-04 PROCEDURE — 85025 COMPLETE CBC W/AUTO DIFF WBC: CPT | Performed by: INTERNAL MEDICINE

## 2022-02-04 PROCEDURE — 36415 COLL VENOUS BLD VENIPUNCTURE: CPT | Mod: PO | Performed by: INTERNAL MEDICINE

## 2022-02-04 RX ORDER — GABAPENTIN 300 MG/1
300 CAPSULE ORAL 2 TIMES DAILY
COMMUNITY
Start: 2022-01-19

## 2022-02-04 RX ORDER — HYDROCHLOROTHIAZIDE 25 MG/1
25 TABLET ORAL DAILY
Qty: 90 TABLET | Refills: 3 | Status: SHIPPED | OUTPATIENT
Start: 2022-02-04 | End: 2022-06-08

## 2022-02-04 RX ORDER — LEVOTHYROXINE SODIUM 88 UG/1
88 TABLET ORAL DAILY
Qty: 90 TABLET | Refills: 3 | Status: SHIPPED | OUTPATIENT
Start: 2022-02-04 | End: 2022-02-07 | Stop reason: DRUGHIGH

## 2022-02-04 RX ORDER — ROSUVASTATIN CALCIUM 40 MG/1
40 TABLET, COATED ORAL NIGHTLY
Qty: 90 TABLET | Refills: 3 | Status: SHIPPED | OUTPATIENT
Start: 2022-02-04 | End: 2023-02-06

## 2022-02-04 RX ORDER — PANTOPRAZOLE SODIUM 40 MG/1
40 TABLET, DELAYED RELEASE ORAL DAILY
Qty: 90 TABLET | Refills: 3 | Status: SHIPPED | OUTPATIENT
Start: 2022-02-04 | End: 2023-02-06 | Stop reason: DRUGHIGH

## 2022-02-04 RX ORDER — METOPROLOL TARTRATE 50 MG/1
50 TABLET ORAL 2 TIMES DAILY
Qty: 180 TABLET | Refills: 3 | Status: SHIPPED | OUTPATIENT
Start: 2022-02-04 | End: 2023-02-06 | Stop reason: SDUPTHER

## 2022-02-04 RX ORDER — ZOSTER VACCINE RECOMBINANT, ADJUVANTED 50 MCG/0.5
0.5 KIT INTRAMUSCULAR ONCE
Qty: 1 EACH | Refills: 1 | Status: SHIPPED | OUTPATIENT
Start: 2022-02-04 | End: 2022-02-04

## 2022-02-04 NOTE — PATIENT INSTRUCTIONS
Need total of calcium 1200 mg daily either through diet or supplement    A serving of dairy has about 300-400 mg of calcium.    Increase the potassium to 2 a day.

## 2022-02-04 NOTE — PROGRESS NOTES
This note was created by combination of typed  and M-Modal dictation.  Transcription errors may be present.  If there are any questions, please contact me.    Assessment and Plan:   Normal physical exam  Screening for colon cancer  -Check fasting labs  Colonoscopy ordered.  Reports a history of colon polyps.  She also has a known history of AVM.  Outside GI had recommended repeat colonoscopy.    Recommended total of 1200 mg calcium daily.  She drinks 1 serving of milk a day.  She also takes a multivitamin.  Asked her to verify dosage of the multivitamin  -     Comprehensive Metabolic Panel; Future; Expected date: 02/04/2023  -     Lipid Panel; Future; Expected date: 02/04/2023  -     CBC Auto Differential; Future; Expected date: 02/04/2023  -     Phosphorus; Future; Expected date: 02/04/2023  -     PTH, Intact; Future; Expected date: 02/04/2023  -     TSH; Future; Expected date: 02/04/2023  -     Case Request Endoscopy: COLONOSCOPY    Iron deficiency anemia due to chronic blood loss  - followed by outside Hematology.  [place] to be due to AVM.  Last iron infusion was in August.  Plan with Hematology as follow-up in May.  Check colonoscopy for AVM  -     Case Request Endoscopy: COLONOSCOPY  -     CBC Auto Differential; Future; Expected date: 02/04/2022    Stage 3b chronic kidney disease  Essential hypertension  Hypokalemia due to loss of potassium  - has been taking potassium 20 not 40.  Last potassium low normal.  I would go ahead and take 40. Check labs today, will be on 20   she is aware of the need to avoid NSAIDs.  Some days when she gets breakthrough pain the only thing that helps is Aleve.  Advised to take as sparingly as possible.  -     Renal Function Panel; Future; Expected date: 02/04/2022  -     Hepatic Function Panel; Future; Expected date: 02/04/2022  -     PTH, Intact; Future; Expected date: 02/04/2022  -     Comprehensive Metabolic Panel; Future; Expected date: 02/04/2023  -     CBC Auto  Differential; Future; Expected date: 02/04/2023  -     Phosphorus; Future; Expected date: 02/04/2023  -     PTH, Intact; Future; Expected date: 02/04/2023  -     hydroCHLOROthiazide (HYDRODIURIL) 25 MG tablet; Take 1 tablet (25 mg total) by mouth once daily.  Dispense: 90 tablet; Refill: 3  -     metoprolol tartrate (LOPRESSOR) 50 MG tablet; Take 1 tablet (50 mg total) by mouth 2 (two) times daily.  Dispense: 180 tablet; Refill: 3    Pure hypercholesterolemia  -  Check labs on rosuvastatin.  -     Lipid Panel; Future; Expected date: 02/04/2022  -     rosuvastatin (CRESTOR) 40 MG Tab; Take 1 tablet (40 mg total) by mouth every evening.  Dispense: 90 tablet; Refill: 3  -     Lipid Panel; Future; Expected date: 02/04/2023    Postoperative hypothyroidism  -  Check labs on levothyroxine  -     TSH; Future; Expected date: 02/04/2022  -     levothyroxine (SYNTHROID) 88 MCG tablet; Take 1 tablet (88 mcg total) by mouth once daily.  Dispense: 90 tablet; Refill: 3  -     TSH; Future; Expected date: 02/04/2023    Monoclonal gammopathy of undetermined significance  -  Followed by outside Hematology-Oncology    Gastroesophageal reflux disease without esophagitis  - refilled Protonix  -     pantoprazole (PROTONIX) 40 MG tablet; Take 1 tablet (40 mg total) by mouth once daily.  Dispense: 90 tablet; Refill: 3    Osteoporosis, unspecified osteoporosis type, unspecified pathological fracture presence  - check DEXA scan.  Hx of reclast but if osteoporosis still present will refer to endo to discuss options given CKD stage 3a vs stage 3b  -     DXA Bone Density Spine And Hip; Future; Expected date: 02/04/2022    Needs flu shot  -     Influenza - Quadrivalent (Adjuvanted)    Need for shingles vaccine  -rx to pharmacy  -     varicella-zoster gE-AS01B, PF, (SHINGRIX, PF,) 50 mcg/0.5 mL injection; Inject 0.5 mLs into the muscle once. Repeat in 2 months for 1 dose  Dispense: 1 each; Refill: 1          Medications Discontinued During This  Encounter   Medication Reason    metoprolol tartrate (LOPRESSOR) 50 MG tablet Reorder    levothyroxine (SYNTHROID) 88 MCG tablet Reorder    pantoprazole (PROTONIX) 40 MG tablet Reorder    rosuvastatin (CRESTOR) 40 MG Tab Reorder    hydroCHLOROthiazide (HYDRODIURIL) 25 MG tablet Reorder       meds sent this encounter:  Medications Ordered This Encounter   Medications    hydroCHLOROthiazide (HYDRODIURIL) 25 MG tablet     Sig: Take 1 tablet (25 mg total) by mouth once daily.     Dispense:  90 tablet     Refill:  3     .    levothyroxine (SYNTHROID) 88 MCG tablet     Sig: Take 1 tablet (88 mcg total) by mouth once daily.     Dispense:  90 tablet     Refill:  3    metoprolol tartrate (LOPRESSOR) 50 MG tablet     Sig: Take 1 tablet (50 mg total) by mouth 2 (two) times daily.     Dispense:  180 tablet     Refill:  3     .    pantoprazole (PROTONIX) 40 MG tablet     Sig: Take 1 tablet (40 mg total) by mouth once daily.     Dispense:  90 tablet     Refill:  3    rosuvastatin (CRESTOR) 40 MG Tab     Sig: Take 1 tablet (40 mg total) by mouth every evening.     Dispense:  90 tablet     Refill:  3    varicella-zoster gE-AS01B, PF, (SHINGRIX, PF,) 50 mcg/0.5 mL injection     Sig: Inject 0.5 mLs into the muscle once. Repeat in 2 months for 1 dose     Dispense:  1 each     Refill:  1       Follow Up: No follow-ups on file. PEx 1 year with labs    Subjective:     Chief Complaint   Patient presents with    Annual Exam       HPI  Fabienne is a 76 y.o. female, last appointment with this clinic was Visit date not found.  Social History     Tobacco Use    Smoking status: Current Some Day Smoker     Types: Cigarettes    Smokeless tobacco: Never Used   Substance Use Topics    Alcohol use: Yes          Social History     Social History Narrative    Not on file       No LMP recorded. Patient is postmenopausal.    Last visit with me January last year  Preop for knee surgery  Hypertension, CKD stage IIIB, hypokalemia stable at  that time  Lipid/statin  MGUS followed by outside heme.  Iron deficiency anemia with history of AVM on colonoscopy.  Follow-up with heme.  Hypothyroid on replacement  GERD ppi, else, symptomatic  Osteoporosis needed repeat DEXA scan  HFpEF 12/2017 TTE    TSH mildly low, free T4 normal.  On levothyroxine  CMP with CKD stage IIIA seen previously.  Potassium good on supplement  Lipid triglycerides high, non HDL less than 130.  On statin.  Ferritin low.  CBC normal.  Previously with anemia from GI AV malformation.  History of iron infusion has not had any recently. has been followed by outside heme.  PTT and INR normal  Chest x-ray normal  Urinalysis normal    Saw hematology in follow-up in November.  Most recent IV iron replacement August 2021. Stable.  Follow-up 6 months.    Outside labs  11/24/2021 creatinine 1.56  Hemoglobin 13.6  Ferritin 183  Iron sats 28    High stress at home.  Business is dissolving.    stays at home all the time.    Is a little frustrated by , he's not practicing self care.    Did 36 therapy sessions for her knee. Was very painful. But overall feels it was worth it.     Taking K once daily not 2 daily. Has been taking once daily since summer.   11/2021 K 3.7  Change to 2 daily.     Glass of milk daily.     Hx of colon polyps  Hx of hiatal hernia    Outside GI note 6/10/2020  History of GERD/hiatal hernia  Symptoms controlled with pantoprazole for most part  Patient does have some bloating and pressure in the upper abdomen if she eats a large fatty meal.  No gross GI bleeding.  Most recent H&H as above is normal.  History of iron deficiency anemia-has been receiving iron infusions through Dr. Fung 2-3 times per year  History of colon polyps-due for repeat colonoscopy in April 2021    Planning to visit son in [place] - hx of recurrence of cancer in the legs. Sarcoma?      Patient Care Team:  Gordo Canela MD as PCP - General (Internal Medicine)  Geronimo Ugarte MD  (Rheumatology)  Romulo Fung MD as Consulting Physician (Hematology and Oncology)  Lori Mccarthy MD (Dermatology)  Timbo Greene MD as Consulting Physician (Ophthalmology)  Jesús Berkowitz (Obstetrics and Gynecology)  Keara Hernandez MA as Care Coordinator  Geronimo Bueno MD (Gastroenterology)  Ger Barclay MD as Consulting Physician (Orthopedic Surgery)    Patient Active Problem List    Diagnosis Date Noted    Stage 3b chronic kidney disease 12/06/2018    Cataract of left eye 12/06/2018    Hypokalemia due to loss of potassium 09/14/2018    Erosive osteoarthritis hx of HCQ; rheumatology. 09/10/2018    AVM (arteriovenous malformation) of colon colonoscopy 2016 12/11/2017     Colonoscopy 2016 had AVM which was coagulated.      Pure hypercholesterolemia 12/08/2017    Iron deficiency anemia 12/08/2017     iron deficiency anemia that dates back to her teenage years. She states that she has been anemic most of her adult life. She was followed by Dr. Kristy Ray for the final 3-4 years of Dr. uY career and then she was followed by Dr. Angie Atkins for the past two years prior to establishing with Dr. Fung in February, 2016. Prior to her initial visits with Dr. Ray, the patient had been seen by a hematologist in [place] in the 1990s because at the time of a thyroidectomy, she was found to be so severely anemic that she required 14 months of injections of iron in order to get her to an acceptable level of hemoglobin and hematocrit prior to her thyroidectomy. During her timeframe of follow-up with Dr. Ray, she was treated with only oral iron. Over the two years of follow-up with Dr. Atkins through December 2015, she was given approximately eight courses of IV iron believed to be Injectafer.   She was seen in clinic on 02/19/2016 for initial consultation regarding iron deficiency anemia and evaluation of underlying etiology. Laboratory analysis was ordered at that time  for reevaluation of iron deficiency along with reevaluation of the presence of previously identified M-protein. She was also referred to Gastroenterology (Dr. Jamie Martinez) for evaluation of GI blood loss as potential underlying etiology of longstanding iron deficiency. Secondary to her identified iron deficiency she received IV iron replacement in the form of Injectafer 750 mg x 2 doses (3/21/2016 and 03/28/2016).   7/2017: Extraordinarily responsive to minimal doses of IV iron. No evidence of significant anemia today. Ferritin is at low limit of normal. Recheck blood work in 3 months and keep follow-up at 6 months.      Monoclonal gammopathy of undetermined significance 12/08/2017     Dr. Fung in [place]      Neutrophilic leukocytosis 11/28/2017    Essential hypertension 11/28/2017 12/2017 TTE showed preserved EF with diastolic CHF.      History of gram negative sepsis urosepsis 11/2017 11/28/2017     Hospitalization 12/2017  CT of the abdomen and pelvis were performed which was concerning for possible perinephric abscess.  US kidney showed no sign of abscess  Allergic rxn to zosyn.  Did not react to penicillin, so NOT a penicillin allergy.  positive blood culture for Ecoli,source UTI      Osteoporosis 03/14/2017    Asymptomatic cholelithiasis 04/20/2016    Postoperative hypothyroidism 02/12/2015    Gastroesophageal reflux disease without esophagitis 02/12/2015      Upper gastrointestinal endoscopy 04/27/2006 - gastritis, hernia    Upper gastrointestinal endoscopy 04/27/2006    Upper gastrointestinal endoscopy 03/29/2016          PAST MEDICAL PROBLEMS, PAST SURGICAL HISTORY: please see relevant portions of the electronic medical record    ALLERGIES AND MEDICATIONS: updated and reviewed.  Review of patient's allergies indicates:   Allergen Reactions    Meperidine     Statins-hmg-coa reductase inhibitors      Flu like symptoms.       Medication List with Changes/Refills   Current Medications    FISH  "OIL-OMEGA-3 FATTY ACIDS 300-1,000 MG CAPSULE    Take 2 g by mouth once daily.    FLUTICASONE (FLONASE) 50 MCG/ACTUATION NASAL SPRAY    1 spray by Each Nare route once daily.    HYDROCHLOROTHIAZIDE (HYDRODIURIL) 25 MG TABLET    TAKE 1 TABLET BY MOUTH EVERY DAY    LEVOTHYROXINE (SYNTHROID) 88 MCG TABLET    TAKE 1 TABLET BY MOUTH EVERY DAY    METOPROLOL TARTRATE (LOPRESSOR) 50 MG TABLET    TAKE 1 TABLET BY MOUTH TWICE A DAY    MULTIVITAMIN CAPSULE    Take 1 capsule by mouth once daily.    PANTOPRAZOLE (PROTONIX) 40 MG TABLET    TAKE 1 TABLET BY MOUTH EVERY DAY    POTASSIUM CHLORIDE SA (K-DUR,KLOR-CON) 20 MEQ TABLET    TAKE 1 TABLET BY MOUTH TWICE A DAY    ROSUVASTATIN (CRESTOR) 40 MG TAB    TAKE 1 TABLET BY MOUTH EVERY DAY IN THE EVENING      Review of Systems   Constitutional: Negative for fever, malaise/fatigue and weight loss.   HENT: Negative for congestion.    Eyes: Negative for blurred vision and pain.   Respiratory: Negative for shortness of breath and wheezing.    Cardiovascular: Negative for chest pain, palpitations and leg swelling.   Gastrointestinal: Negative for abdominal pain, blood in stool, constipation, diarrhea and heartburn.   Genitourinary: Negative for dysuria.   Neurological: Negative for tingling, focal weakness, weakness and headaches.       Objective:   Physical Exam   Vitals:    02/04/22 1013   BP: 116/80   BP Location: Right arm   Patient Position: Sitting   BP Method: Medium (Manual)   Pulse: 73   Temp: 98.1 °F (36.7 °C)   TempSrc: Oral   SpO2: 97%   Height: 5' 6" (1.676 m)    Body mass index is 28.41 kg/m².            Physical Exam  Constitutional:       Appearance: She is well-developed and well-nourished.   HENT:      Right Ear: Tympanic membrane, ear canal and external ear normal.      Left Ear: Tympanic membrane, ear canal and external ear normal.      Mouth/Throat:      Mouth: Oropharynx is clear and moist.   Eyes:      General: No scleral icterus.     Extraocular Movements: EOM " normal.      Pupils: Pupils are equal, round, and reactive to light.   Neck:      Thyroid: No thyromegaly.   Cardiovascular:      Rate and Rhythm: Normal rate and regular rhythm.      Heart sounds: Normal heart sounds. No murmur heard.      Pulmonary:      Effort: Pulmonary effort is normal.      Breath sounds: Normal breath sounds. No wheezing.   Abdominal:      Palpations: Abdomen is soft. There is no hepatomegaly, splenomegaly or mass.      Tenderness: There is no abdominal tenderness.   Musculoskeletal:         General: No deformity or edema. Normal range of motion.      Cervical back: Neck supple.      Right lower leg: No edema.      Left lower leg: No edema.   Lymphadenopathy:      Cervical: No cervical adenopathy.   Skin:     General: Skin is warm and dry.      Findings: No rash.      Comments: On exposed skin   Neurological:      Mental Status: She is alert and oriented to person, place, and time.      Deep Tendon Reflexes: Reflexes are normal and symmetric.   Psychiatric:         Mood and Affect: Mood and affect normal.         Behavior: Behavior normal.         Thought Content: Thought content normal.         Judgment: Judgment normal.

## 2022-02-07 ENCOUNTER — TELEPHONE (OUTPATIENT)
Dept: FAMILY MEDICINE | Facility: CLINIC | Age: 77
End: 2022-02-07
Payer: MEDICARE

## 2022-02-07 DIAGNOSIS — E89.0 POSTOPERATIVE HYPOTHYROIDISM: Primary | ICD-10-CM

## 2022-02-07 RX ORDER — LEVOTHYROXINE SODIUM 75 UG/1
75 TABLET ORAL
Qty: 60 TABLET | Refills: 0 | Status: SHIPPED | OUTPATIENT
Start: 2022-02-07 | End: 2022-03-17

## 2022-02-07 NOTE — TELEPHONE ENCOUNTER
Please call pt - thyroid test is off a little. It was also a little off back in January 2021  I am going to lower her dose of levothyroxine - she is currently taking 88; will decrease to 75  Repeat thyroid test in 2 months

## 2022-02-07 NOTE — PROGRESS NOTES
TSH low FT4 WNL. On LT 88  Renal panel with CKD stage 3a seen previous  Calcium mild elevated, new  PTH mild elevated, no previous for comparison  LFTs normal  Lipid TG high seen previous; on statin  CBC WNL    Last check 1/2021 also low    Would decrease to 75

## 2022-02-11 ENCOUNTER — PES CALL (OUTPATIENT)
Dept: ADMINISTRATIVE | Facility: CLINIC | Age: 77
End: 2022-02-11
Payer: MEDICARE

## 2022-03-15 DIAGNOSIS — E89.0 POSTOPERATIVE HYPOTHYROIDISM: ICD-10-CM

## 2022-03-15 NOTE — TELEPHONE ENCOUNTER
No new care gaps identified.  Powered by [id number]. Reference number: [id number].   3/15/2022 11:05:17 AM CDT

## 2022-03-17 NOTE — TELEPHONE ENCOUNTER
Refill Routing Note   Medication(s) are not appropriate for processing by [facility] for the following reason(s):      - Medication is a new start (<3 months)    [facility] action(s):  Defer       Medication Therapy Plan: New start; defer  --->Care Gap information included in message below if applicable.   Medication reconciliation completed: No   Automatic Epic Generated Protocol Data:        Requested Prescriptions   Pending Prescriptions Disp Refills    levothyroxine (SYNTHROID) 75 MCG tablet [Pharmacy Med Name: LEVOTHYROXINE 75 MCG TABLET] 90 tablet 3     Sig: Take 1 tablet (75 mcg total) by mouth before breakfast. Repeat TSH 2 months       Endocrinology:  Hypothyroid Agents Failed - 3/15/2022 11:04 AM        Failed - TSH in normal range and within 360 days     TSH   Date Value Ref Range Status   02/04/2022 0.196 (L) 0.400 - 4.000 uIU/mL Final   01/14/2021 0.393 (L) 0.400 - 4.000 uIU/mL Final   07/17/2020 1.089 0.400 - 4.000 uIU/mL Final              Passed - Patient is at least 18 years old        Passed - Valid encounter within last 15 months     Recent Visits  Date Type Provider Dept   02/04/22 Office Visit [facility]   01/13/21 Office Visit [facility]   07/20/20 Office Visit [facility]   Showing recent visits within past 720 days and meeting all other requirements  Future Appointments  No visits were found meeting these conditions.  Showing future appointments within next 150 days and meeting all other requirements      Future Appointments              In 10 months [facility] [place]    [facility] 10 months [facility]                Passed - Manual Review: FT4 is not required if last TSH is WNL.        Passed - T4 free within 1080 days     Free T4   Date Value Ref Range Status   02/04/2022 1.42 0.71 - 1.51 ng/dL Final    01/14/2021 1.35 0.71 - 1.51 ng/dL Final   11/27/2017 1.47 0.71 - 1.51 ng/dL Final                    Appointments  past 12m or future 3m with PCP    Date Provider   Last Visit   2/4/2022 Gordo Canela MD   Next Visit   Visit date not found Gordo Canela MD   ED visits in past 90 days: 0        Note composed:3:24 PM 03/17/2022

## 2022-03-18 RX ORDER — LEVOTHYROXINE SODIUM 75 UG/1
75 TABLET ORAL
Qty: 30 TABLET | Refills: 0 | Status: SHIPPED | OUTPATIENT
Start: 2022-03-18 | End: 2022-05-24 | Stop reason: DRUGHIGH

## 2022-04-28 ENCOUNTER — TELEPHONE (OUTPATIENT)
Dept: FAMILY MEDICINE | Facility: CLINIC | Age: 77
End: 2022-04-28
Payer: MEDICARE

## 2022-04-28 NOTE — TELEPHONE ENCOUNTER
Spoke with the Patient and scheduled an EAWV appointment for 05/10/22 @ 8:30am.  Patient verbally understands.

## 2022-05-09 ENCOUNTER — TELEPHONE (OUTPATIENT)
Dept: ADMINISTRATIVE | Facility: CLINIC | Age: 77
End: 2022-05-09
Payer: MEDICARE

## 2022-05-10 ENCOUNTER — OFFICE VISIT (OUTPATIENT)
Dept: FAMILY MEDICINE | Facility: CLINIC | Age: 77
End: 2022-05-10
Payer: MEDICARE

## 2022-05-10 VITALS
BODY MASS INDEX: 27.56 KG/M2 | TEMPERATURE: 98 F | HEIGHT: 66 IN | DIASTOLIC BLOOD PRESSURE: 78 MMHG | HEART RATE: 69 BPM | WEIGHT: 171.5 LBS | SYSTOLIC BLOOD PRESSURE: 114 MMHG | OXYGEN SATURATION: 97 %

## 2022-05-10 DIAGNOSIS — D47.2 MONOCLONAL GAMMOPATHY OF UNDETERMINED SIGNIFICANCE: ICD-10-CM

## 2022-05-10 DIAGNOSIS — E21.3 HYPERPARATHYROIDISM: ICD-10-CM

## 2022-05-10 DIAGNOSIS — E89.0 POSTOPERATIVE HYPOTHYROIDISM: ICD-10-CM

## 2022-05-10 DIAGNOSIS — I10 ESSENTIAL HYPERTENSION: ICD-10-CM

## 2022-05-10 DIAGNOSIS — N18.32 STAGE 3B CHRONIC KIDNEY DISEASE: ICD-10-CM

## 2022-05-10 DIAGNOSIS — K21.9 GASTROESOPHAGEAL REFLUX DISEASE WITHOUT ESOPHAGITIS: ICD-10-CM

## 2022-05-10 DIAGNOSIS — Z00.00 ENCOUNTER FOR PREVENTIVE HEALTH EXAMINATION: Primary | ICD-10-CM

## 2022-05-10 DIAGNOSIS — M81.0 OSTEOPOROSIS, UNSPECIFIED OSTEOPOROSIS TYPE, UNSPECIFIED PATHOLOGICAL FRACTURE PRESENCE: ICD-10-CM

## 2022-05-10 DIAGNOSIS — E87.6 HYPOKALEMIA DUE TO LOSS OF POTASSIUM: ICD-10-CM

## 2022-05-10 DIAGNOSIS — E78.00 PURE HYPERCHOLESTEROLEMIA: ICD-10-CM

## 2022-05-10 PROCEDURE — 99999 PR PBB SHADOW E&M-EST. PATIENT-LVL IV: CPT | Mod: PBBFAC,,, | Performed by: NURSE PRACTITIONER

## 2022-05-10 PROCEDURE — 99999 PR PBB SHADOW E&M-EST. PATIENT-LVL IV: ICD-10-PCS | Mod: PBBFAC,,, | Performed by: NURSE PRACTITIONER

## 2022-05-10 PROCEDURE — G0439 PPPS, SUBSEQ VISIT: HCPCS | Mod: ,,, | Performed by: NURSE PRACTITIONER

## 2022-05-10 PROCEDURE — 99214 OFFICE O/P EST MOD 30 MIN: CPT | Mod: PBBFAC,PO | Performed by: NURSE PRACTITIONER

## 2022-05-10 PROCEDURE — G0439 PR MEDICARE ANNUAL WELLNESS SUBSEQUENT VISIT: ICD-10-PCS | Mod: ,,, | Performed by: NURSE PRACTITIONER

## 2022-05-10 RX ORDER — LEVOTHYROXINE SODIUM 88 UG/1
88 TABLET ORAL DAILY
COMMUNITY
Start: 2022-02-12 | End: 2022-05-24 | Stop reason: DRUGHIGH

## 2022-05-10 NOTE — PATIENT INSTRUCTIONS
Counseling and Referral of Other Preventative  (Italic type indicates deductible and co-insurance are waived)    Patient Name: Fabienne Goncalves  Today's Date: 5/10/2022    Health Maintenance       Date Due Completion Date    TETANUS VACCINE Never done ---    Shingles Vaccine (1 of 2) 11/26/2014 10/1/2014    DEXA Scan 11/26/2019 11/26/2016    COVID-19 Vaccine (4 - Booster for Pfizer series) 03/11/2022 11/11/2021    Lipid Panel 02/04/2027 2/4/2022        No orders of the defined types were placed in this encounter.    The following information is provided to all patients.  This information is to help you find resources for any of the problems found today that may be affecting your health:                Living healthy guide: www.[facility].[place].gov      Understanding Diabetes: www.diabetes.org      Eating healthy: www.cdc.gov/healthyweight      [facility] home safety checklist: www.cdc.gov/steadi/patient.html      Agency on Aging: www.goea.[place].gov      Alcoholics anonymous (AA): www.aa.org      Physical Activity: www.[place]ne.nih.gov/pj3ajzj      Tobacco use: www.quitwithusla.org

## 2022-05-10 NOTE — PROGRESS NOTES
"  Fabienne Goncalves presented for a  Medicare AWV and comprehensive Health Risk Assessment today. The following components were reviewed and updated:    · Medical history  · Family History  · Social history  · Allergies and Current Medications  · Health Risk Assessment  · Health Maintenance  · Care Team       ** See Completed Assessments for Annual Wellness Visit within the encounter summary.**       The following assessments were completed:  · Living Situation  · CAGE  · Depression Screening  · Timed Get Up and Go  · Whisper Test  · Cognitive Function Screening  · Nutrition Screening  · ADL Screening  · PAQ Screening          Vitals:    05/10/22 0826   BP: 114/78   BP Location: Left arm   Patient Position: Sitting   BP Method: Medium (Manual)   Pulse: 69   Temp: 97.9 °F (36.6 °C)   TempSrc: Oral   SpO2: 97%   Weight: 77.8 kg (171 lb 8.3 oz)   Height: 5' 6" (1.676 m)     Body mass index is 27.68 kg/m².  Physical Exam  Vitals and nursing note reviewed.   Constitutional:       Appearance: Normal appearance.   Cardiovascular:      Rate and Rhythm: Normal rate.      Pulses: Normal pulses.      Heart sounds: Normal heart sounds.   Pulmonary:      Effort: Pulmonary effort is normal.      Breath sounds: Normal breath sounds.   Abdominal:      General: Bowel sounds are normal.      Palpations: Abdomen is soft.   Musculoskeletal:         General: Normal range of motion.   Neurological:      Mental Status: She is alert and oriented to person, place, and time.   Psychiatric:         Mood and Affect: Mood normal.         Behavior: Behavior normal.             Diagnoses and health risks identified today and associated recommendations/orders:    1. Encounter for preventive health examination  Pt was seen today for an Annual Wellness visit. Healthcare maintenance and screening recommendations were discussed and updated as indicated. Return in one year for AWV.    Review current opioid prescriptions: n/a  Screen for potential " Substance Use Disorders: n/a    2. Stage 3b chronic kidney disease  The current medical regimen is effective;  continue present plan and medications.    3. Hyperparathyroidism  The current medical regimen is effective;  continue present plan and medications.    4. Essential hypertension  Stable. The current medical regimen is effective;  continue present plan and medications.    5. Pure hypercholesterolemia  Pt reports being off her statin medication for over 2 months d/t cost of medication. Discussed diet, activity, medication. Will inform provider.     6. Hypokalemia due to loss of potassium  The current medical regimen is effective;  continue present plan and medications.    7. Postoperative hypothyroidism  The current medical regimen is effective;  continue present plan and medications.    8. Gastroesophageal reflux disease without esophagitis  The current medical regimen is effective;  continue present plan and medications.    9. Osteoporosis, unspecified osteoporosis type, unspecified pathological fracture presence  The current medical regimen is effective;  continue present plan and medications.    10. Monoclonal gammopathy of undetermined significance  The current medical regimen is effective;  continue present plan and medications.        Provided Fabienne with a 5-10 year written screening schedule and personal prevention plan. Recommendations were developed using the USPSTF age appropriate recommendations. Education, counseling, and referrals were provided as needed. After Visit Summary printed and given to patient which includes a list of additional screenings\tests needed.    Follow up in about 9 months (around 2/6/2023) with provider.    DAXA Oliver  I offered to discuss advanced care planning, including how to pick a person who would make decisions for you if you were unable to make them for yourself, called a health care power of , and what kind of decisions you might make such as use  of life sustaining treatments such as ventilators and tube feeding when faced with a life limiting illness recorded on a living will that they will need to know. (How you want to be cared for as you near the end of your natural life)     X Patient is interested in learning more about how to make advanced directives.  I provided them paperwork and offered to discuss this with them.

## 2022-05-12 ENCOUNTER — TELEPHONE (OUTPATIENT)
Dept: FAMILY MEDICINE | Facility: CLINIC | Age: 77
End: 2022-05-12
Payer: MEDICARE

## 2022-05-12 NOTE — TELEPHONE ENCOUNTER
Pt was recently in for AWV and noted that the rosuvastatin (crestor) was expensive  With goodrx.com coupon it is $12 at [facility].  At [facility] it is $62.   Can she download the goodrx.com coupon from the internet, or the cheri?  If she is interested in that, I can send in rx to [facility]    Let me know what she says

## 2022-05-12 NOTE — TELEPHONE ENCOUNTER
Attempted to contact patient , spoke with patient's spouse, stated patient was unavailable at this time. Message left to call clinic back.

## 2022-05-16 ENCOUNTER — HOSPITAL ENCOUNTER (OUTPATIENT)
Dept: RADIOLOGY | Facility: CLINIC | Age: 77
Discharge: HOME | End: 2022-05-16
Attending: INTERNAL MEDICINE
Payer: MEDICARE

## 2022-05-16 DIAGNOSIS — M81.0 OSTEOPOROSIS, UNSPECIFIED OSTEOPOROSIS TYPE, UNSPECIFIED PATHOLOGICAL FRACTURE PRESENCE: ICD-10-CM

## 2022-05-16 PROCEDURE — 77080 DEXA BONE DENSITY SPINE HIP: ICD-10-PCS | Mod: 26,,, | Performed by: INTERNAL MEDICINE

## 2022-05-16 PROCEDURE — 77080 DXA BONE DENSITY AXIAL: CPT | Mod: TC,PO

## 2022-05-16 PROCEDURE — 77080 DXA BONE DENSITY AXIAL: CPT | Mod: 26,,, | Performed by: INTERNAL MEDICINE

## 2022-05-23 NOTE — PROGRESS NOTES
05/21/2022 DEXA scan L-spine-3.6, total hip-2.7, femoral neck -2.4.  FRAX score 11/25%.osteoporosis  9/2018 vit D good  Last TSH was low, FT4 WNL, on LT  CKD stage 3a  PTH mild elevated no previous for comparison    Previous DEXA scan 2016 with osteoporosis but no parameters provided

## 2022-05-24 ENCOUNTER — TELEPHONE (OUTPATIENT)
Dept: FAMILY MEDICINE | Facility: CLINIC | Age: 77
End: 2022-05-24
Payer: MEDICARE

## 2022-05-24 DIAGNOSIS — E89.0 POSTOPERATIVE HYPOTHYROIDISM: ICD-10-CM

## 2022-05-24 DIAGNOSIS — M81.0 AGE-RELATED OSTEOPOROSIS WITHOUT CURRENT PATHOLOGICAL FRACTURE: Primary | ICD-10-CM

## 2022-05-24 RX ORDER — LEVOTHYROXINE SODIUM 75 UG/1
75 TABLET ORAL
Qty: 30 TABLET | Refills: 0 | Status: SHIPPED | OUTPATIENT
Start: 2022-05-24 | End: 2022-06-18

## 2022-06-08 ENCOUNTER — OFFICE VISIT (OUTPATIENT)
Dept: ENDOCRINOLOGY | Facility: CLINIC | Age: 77
End: 2022-06-08
Payer: MEDICARE

## 2022-06-08 VITALS
DIASTOLIC BLOOD PRESSURE: 67 MMHG | HEART RATE: 65 BPM | WEIGHT: 171.19 LBS | BODY MASS INDEX: 26.87 KG/M2 | SYSTOLIC BLOOD PRESSURE: 120 MMHG | HEIGHT: 67 IN | TEMPERATURE: 98 F

## 2022-06-08 DIAGNOSIS — E83.52 HYPERCALCEMIA: ICD-10-CM

## 2022-06-08 DIAGNOSIS — M81.0 AGE-RELATED OSTEOPOROSIS WITHOUT CURRENT PATHOLOGICAL FRACTURE: ICD-10-CM

## 2022-06-08 DIAGNOSIS — Z72.0 TOBACCO USER: ICD-10-CM

## 2022-06-08 DIAGNOSIS — I10 ESSENTIAL HYPERTENSION: ICD-10-CM

## 2022-06-08 DIAGNOSIS — E21.3 HYPERPARATHYROIDISM: Primary | ICD-10-CM

## 2022-06-08 DIAGNOSIS — E78.00 PURE HYPERCHOLESTEROLEMIA: ICD-10-CM

## 2022-06-08 DIAGNOSIS — N18.32 STAGE 3B CHRONIC KIDNEY DISEASE: ICD-10-CM

## 2022-06-08 DIAGNOSIS — E89.0 POSTOPERATIVE HYPOTHYROIDISM: ICD-10-CM

## 2022-06-08 PROCEDURE — 99204 PR OFFICE/OUTPT VISIT, NEW, LEVL IV, 45-59 MIN: ICD-10-PCS | Mod: S$PBB,,, | Performed by: HOSPITALIST

## 2022-06-08 PROCEDURE — 99999 PR PBB SHADOW E&M-EST. PATIENT-LVL IV: ICD-10-PCS | Mod: PBBFAC,,, | Performed by: HOSPITALIST

## 2022-06-08 PROCEDURE — 99999 PR PBB SHADOW E&M-EST. PATIENT-LVL IV: CPT | Mod: PBBFAC,,, | Performed by: HOSPITALIST

## 2022-06-08 PROCEDURE — 99214 OFFICE O/P EST MOD 30 MIN: CPT | Mod: PBBFAC | Performed by: HOSPITALIST

## 2022-06-08 PROCEDURE — 99204 OFFICE O/P NEW MOD 45 MIN: CPT | Mod: S$PBB,,, | Performed by: HOSPITALIST

## 2022-06-08 RX ORDER — AMLODIPINE BESYLATE 5 MG/1
5 TABLET ORAL DAILY
Qty: 90 TABLET | Refills: 0 | Status: SHIPPED | OUTPATIENT
Start: 2022-06-08 | End: 2023-02-06 | Stop reason: SDUPTHER

## 2022-06-08 NOTE — ASSESSMENT & PLAN NOTE
- Patient with reported longstanding history of Osteoporosis:  Previous medications including oral bisphosphonate for 2 years, along with Forteo daily injection for 2 years  - most recent DXA scan personal reviewed by me and discussed with patient in clinic.  unable to load up previous DEXA from outside facility for review  - history of CKD stage 3b  - treatment option today:  Discussed Prolia every 6 months injection, patient is comfortable with this plan>> ordered placed  - elevated PTH noted, hypercalcemia noted, see workup below  - Next DXA:  2 years from  most current  - continue Calcium/Vit D supplements  - Fall precautions/Exercise regimen: advised

## 2022-06-08 NOTE — ASSESSMENT & PLAN NOTE
- patient with noted hyperparathyroidism in mild hypercalcemia, on lab work done by PCP this year  - no previous history of PTH or hypercalcemia  - osteoporosis as above  - suspect secondary to CKD stage 3B  - no history of renal stone  - suspect hydrochlorothiazide as cause, or leading to worsening hypercalcemia  - we will stop hydrochlorothiazide, will repeat lab work in 6 months to re-evaluate PTH and calcium level, spot calcium/creatinine urine  - reassurance given, does not fit criteria for parathyroidectomy

## 2022-06-08 NOTE — ASSESSMENT & PLAN NOTE
- Renal function reviewed on lab work today, stable   - continue routine monitoring  - hyperparathyroidism can be secondary to CKD

## 2022-06-08 NOTE — ASSESSMENT & PLAN NOTE
- history of total thyroidectomy, reportedly for suspicious lesions, reportedly had PLATA  - denies thyroid cancer diagnosis  -  suspect iatrogenic hyperthyroidism given TFTs  - Will repeat TFTs, medication recently decreased by PCP to 75 mcg daily ((previously 88 mcg)

## 2022-06-08 NOTE — ASSESSMENT & PLAN NOTE
"- longstanding tobacco use history, still smokes off and on"  - discussed cessation  - can lead to worsening bone loss  "

## 2022-06-08 NOTE — PROGRESS NOTES
"Subjective:      Patient ID: Fabienne Goncalves is a 77 y.o. female presented to [facility] on 6/8/2022.  Chief Complaint:  Osteoporosis      History of Present Illness: Fabienne Goncalves is a 77 y.o. female here for  osteoporosis  Other significant past medical history:  Hypothyroidism, Hypertension, tobacco use       1) In regards to Osteoporosis  Diagnosis on DXA in >18 years ago  Once a week pill >> 2 years  Then Forteo>> daily for 2 years    MVN daily  Take vit D 1000ui     Osteoporosis Risk Factor Assessment:  History of falls over the last 2 years: no  History of fractures to wrist, hip or spine:no,  fractured right shoulder at 72 y/o  History of loss of height of more than 1 1/2 to 2 inches: yes, 5'9" in teenage years>> 5'6.5" now  Family history of osteoporosis: yes, mother, sisters  Family history of fractures of hip: yes, mother with broke hip  Age of menopause: 52 yo,   No: use of HRT  Tobacco use, including use in the past:  Yes, former smoker>> 25 years. Still off and on now  EtOH use? no     Yes: history of CKD3   yes: history of thyroid disease    no: history of kidney stones    Denies active malignancy, history of malignancy the involved the bone, prior radiation treatment, or unexplained elevations of alk phos on labs.  No current:  Gastrointestinal issues including:  Chronic diarrhea, celiac disease, ulcerative colitis/Crohn or h/o malabsorption or gastric surgery  Diet: Does eat regularly Cheese/Dairy/Milk   Weight bearing exercise?   yes (walking, 25 mins daily)  Dental work planned?  No, full set dentures  Taking care of sick     Recent DXA:   5/16/2022  Lumbar spine (L1-L4):  BMD is 0.656 g/cm2, T-score is -3.6, and Z-score is -1.0.  Total hip: BMD is 0.616 g/cm2, T-score is -2.7, and Z-score is -0.8.  Femoral neck: BMD is 0.585 g/cm2, T-score is -2.4, and Z-score is -0.2.     FRAX:  25% risk of a major osteoporotic fracture in the next 10 years.  11% risk of hip " "fracture in the next 10 years.     Impression:  *Osteoporosis based on T-score below -2.5  *Fracture risk is very high based on previous fracture and due to T-score below -3.0    Lab work reviewed  Lab Results   Component Value Date    PTH 89.9 (H) 02/04/2022    [id number] 45 09/11/2018    CALCIUM 10.7 (H) 02/04/2022    CALCIUM 9.9 01/14/2021    CALCIUM 9.7 07/17/2020    PHOS 3.3 02/04/2022    PHOS 1.6 (L) 11/29/2017    ALKPHOS 71 02/04/2022    ALKPHOS 70 01/14/2021    ALKPHOS 58 07/17/2020    TSH 0.196 (L) 02/04/2022       2) Postsurgical hypothyroidism  - suspicious lesion, reportedly had PLATA after  - at age 52 yo  - currently on 75mcg daily>> PCP changed it from 88mcg daily  - taking it as directed    Lab Results   Component Value Date    TSH 0.196 (L) 02/04/2022    TSH 0.393 (L) 01/14/2021    TSH 1.089 07/17/2020    TSH 1.871 09/11/2018    FREET4 1.42 02/04/2022    FREET4 1.35 01/14/2021    FREET4 1.47 11/27/2017         3) Hypercalcemia/hyperparathyroidism  - incidental finding with recent lab work  - History of osteoporosis as above, no excessive vitamin-D or calcium supplement  - No previous history of hyperparathyroidism or hypercalcemia  - No renal stone  - On hydrochlorothiazide for many years       Reviewed past surgical, medical, family, social history and updated as appropriate.    Review of Systems: see HPI above    Objective:   /67 (BP Location: Left arm)   Pulse 65   Temp 98.1 °F (36.7 °C) (Oral)   Ht 5' 6.5" (1.689 m)   Wt 77.7 kg (171 lb 3.2 oz)   BMI 27.22 kg/m²     Body mass index is 27.22 kg/m².  Vital signs reviewed    Physical Exam  Vitals and nursing note reviewed.   Constitutional:       General: She is not in acute distress.     Appearance: Normal appearance. She is well-developed. She is not toxic-appearing.   Neck:      Thyroid: No thyromegaly.      Comments: Remote thyroidectomy scar noted  Cardiovascular:      Heart sounds: Normal heart sounds.   Pulmonary:      Effort: " Pulmonary effort is normal. No respiratory distress.   Musculoskeletal:         General: No deformity. Normal range of motion.      Cervical back: Normal range of motion.   Skin:     Findings: No bruising.   Neurological:      Mental Status: She is alert and oriented to person, place, and time.   Psychiatric:         Mood and Affect: Mood normal.       Lab Reviewed:   No results found for: HGBA1C    Lab Results   Component Value Date    CHOL 137 02/04/2022    HDL 47 02/04/2022    LDLCALC 52.2 (L) 02/04/2022    TRIG 189 (H) 02/04/2022    CHOLHDL 34.3 02/04/2022       Lab Results   Component Value Date     02/04/2022    K 4.2 02/04/2022     02/04/2022    CO2 27 02/04/2022    GLU 83 02/04/2022    BUN 21 02/04/2022    CREATININE 1.2 02/04/2022    CALCIUM 10.7 (H) 02/04/2022    PROT 7.0 02/04/2022    ALBUMIN 4.0 02/04/2022    ALBUMIN 4.0 02/04/2022    BILITOT 0.4 02/04/2022    ALKPHOS 71 02/04/2022    AST 22 02/04/2022    ALT 26 02/04/2022    ANIONGAP 10 02/04/2022    ESTGFRAFRICA 50.7 (A) 02/04/2022    EGFRNONAA 44.0 (A) 02/04/2022    TSH 0.196 (L) 02/04/2022        Lab Results   Component Value Date    PTH 89.9 (H) 02/04/2022    [id number] 45 09/11/2018    CALCIUM 10.7 (H) 02/04/2022    CALCIUM 9.9 01/14/2021    CALCIUM 9.7 07/17/2020    PHOS 3.3 02/04/2022    PHOS 1.6 (L) 11/29/2017    ALKPHOS 71 02/04/2022    ALKPHOS 70 01/14/2021    ALKPHOS 58 07/17/2020    TSH 0.196 (L) 02/04/2022       Assessment     1. Hyperparathyroidism  Vitamin D    PTH, Intact    Calcium/Creatinine Ratio, Urine    PTH, Intact   2. Age-related osteoporosis without current pathological fracture  Ambulatory referral/consult to Endocrinology    Comprehensive Metabolic Panel    Vitamin D    PTH, Intact    Comprehensive Metabolic Panel    PTH, Intact   3. Stage 3b chronic kidney disease     4. Postoperative hypothyroidism  TSH    T4, Free    TSH   5. Essential hypertension  amLODIPine (NORVASC) 5 MG tablet   6. Hypercalcemia   Calcium/Creatinine Ratio, Urine    Comprehensive Metabolic Panel   7. Tobacco user  Ambulatory referral/consult to Smoking Cessation Program   8. Pure hypercholesterolemia          Plan     Age-related osteoporosis without current pathological fracture  - Patient with reported longstanding history of Osteoporosis:  Previous medications including oral bisphosphonate for 2 years, along with Forteo daily injection for 2 years  - most recent DXA scan personal reviewed by me and discussed with patient in clinic.  unable to load up previous DEXA from outside facility for review  - history of CKD stage 3b  - treatment option today:  Discussed Prolia every 6 months injection, patient is comfortable with this plan>> ordered placed  - elevated PTH noted, hypercalcemia noted, see workup below  - Next DXA:  2 years from  most current  - continue Calcium/Vit D supplements  - Fall precautions/Exercise regimen: advised    Hyperparathyroidism  - patient with noted hyperparathyroidism in mild hypercalcemia, on lab work done by PCP this year  - no previous history of PTH or hypercalcemia  - osteoporosis as above  - suspect secondary to CKD stage 3B  - no history of renal stone  - suspect hydrochlorothiazide as cause, or leading to worsening hypercalcemia  - we will stop hydrochlorothiazide, will repeat lab work in 6 months to re-evaluate PTH and calcium level, spot calcium/creatinine urine  - reassurance given, does not fit criteria for parathyroidectomy    Stage 3b chronic kidney disease  - Renal function reviewed on lab work today, stable   - continue routine monitoring  - hyperparathyroidism can be secondary to CKD       Essential hypertension  - we will stop HCTZ, start patient on amlodipine 5 mg daily as alternative  - Avoid HCTZ/Chlorthalidone in the future  - follow-up with PCP for monitor    Postoperative hypothyroidism  - history of total thyroidectomy, reportedly for suspicious lesions, reportedly had PLATA  - denies thyroid  "cancer diagnosis  -  suspect iatrogenic hyperthyroidism given TFTs  - Will repeat TFTs, medication recently decreased by PCP to 75 mcg daily ((previously 88 mcg)     Tobacco user  - longstanding tobacco use history, still smokes off and on"  - discussed cessation  - can lead to worsening bone loss    Pure hypercholesterolemia  - ASCVD Risk below: Statin: Taking  The 10-year ASCVD risk score (Lissa WALLACE Jr., et al., 2013) is: 30.1%    Values used to calculate the score:      Age: 77 years      Sex: Female      Is Non- : No      Diabetic: No      Tobacco smoker: Yes      Systolic Blood Pressure: 120 mmHg      Is BP treated: Yes      HDL Cholesterol: 47 mg/dL      Total Cholesterol: 137 mg/dL      Advised patient to follow up with PCP for routine health maintenance care.   RTC in 6 months with lab work prior      Warner Lugo M.D.  Endocrinology  [facility]  6/8/2022      Disclaimer: This note has been generated in part with the use of voice-recognition software. There may be typographical errors that have been missed during proof-reading.    "

## 2022-06-08 NOTE — ASSESSMENT & PLAN NOTE
- ASCVD Risk below: Statin: Taking  The 10-year ASCVD risk score (Lissa WALLACE Jr., et al., 2013) is: 30.1%    Values used to calculate the score:      Age: 77 years      Sex: Female      Is Non- : No      Diabetic: No      Tobacco smoker: Yes      Systolic Blood Pressure: 120 mmHg      Is BP treated: Yes      HDL Cholesterol: 47 mg/dL      Total Cholesterol: 137 mg/dL

## 2022-06-17 DIAGNOSIS — E89.0 POSTOPERATIVE HYPOTHYROIDISM: ICD-10-CM

## 2022-06-17 NOTE — TELEPHONE ENCOUNTER
No new care gaps identified.  [facility] Embedded Care Gaps. Reference number: [id number]. 6/17/2022   9:31:34 AM JAET

## 2022-06-17 NOTE — TELEPHONE ENCOUNTER
Refill Routing Note   Medication(s) are not appropriate for processing by [facility] for the following reason(s):      - low TSH    [facility] action(s):  Defer          Medication reconciliation completed: No     Appointments  past 12m or future 3m with PCP    Date Provider   Last Visit   2/4/2022 Gordo Canela MD   Next Visit   Visit date not found Gordo Canela MD   ED visits in past 90 days: 0        Note composed:12:31 PM 06/17/2022

## 2022-06-18 RX ORDER — LEVOTHYROXINE SODIUM 75 UG/1
75 TABLET ORAL
Qty: 30 TABLET | Refills: 0 | Status: SHIPPED | OUTPATIENT
Start: 2022-06-18 | End: 2023-02-06 | Stop reason: SDUPTHER

## 2022-06-22 ENCOUNTER — INFUSION (OUTPATIENT)
Dept: INFUSION THERAPY | Facility: HOSPITAL | Age: 77
End: 2022-06-22
Attending: HOSPITALIST
Payer: MEDICARE

## 2022-06-22 VITALS
SYSTOLIC BLOOD PRESSURE: 133 MMHG | HEART RATE: 69 BPM | TEMPERATURE: 99 F | RESPIRATION RATE: 16 BRPM | DIASTOLIC BLOOD PRESSURE: 65 MMHG | OXYGEN SATURATION: 97 %

## 2022-06-22 DIAGNOSIS — M81.0 AGE-RELATED OSTEOPOROSIS WITHOUT CURRENT PATHOLOGICAL FRACTURE: Primary | ICD-10-CM

## 2022-06-22 PROCEDURE — 63600175 PHARM REV CODE 636 W HCPCS: Mod: JG | Performed by: HOSPITALIST

## 2022-06-22 PROCEDURE — 96372 THER/PROPH/DIAG INJ SC/IM: CPT

## 2022-06-22 RX ADMIN — DENOSUMAB 60 MG: 60 INJECTION SUBCUTANEOUS at 08:06

## 2022-06-22 NOTE — PLAN OF CARE
Pt arrived to unit for first initial Prolia. Most recent calcium reviewed and wnl. Pt reports she is currently taking calcium and vitamin d at home as directed. Denies any recent falls or major dental work as pt wears dentures. VSS. Tolerated injection well. Monitored for about 10 minutes after receiving her injection. Received reminder for next appointment in 6 months. Discharged from unit in NAD.

## 2022-08-23 ENCOUNTER — TELEPHONE (OUTPATIENT)
Dept: FAMILY MEDICINE | Facility: CLINIC | Age: 77
End: 2022-08-23
Payer: MEDICARE

## 2022-08-23 NOTE — TELEPHONE ENCOUNTER
----- Message from Liliana Fletcher sent at 8/23/2022  2:13 PM CDT -----  RX Refill Request    Who Called:self    Refill or New Rx: refill    Prescription: rosuvastatin (CRESTOR) 40 MG Tab    Preferred Pharmacy:[facility]/pharmacy #[id number] - [place], [place] - [address]   Phone:  [phone]  Fax:  [phone]    Local or Mail Order:local    Would the patient rather a call back or a response via [facility]? call    Best Call Back Number: .[phone] (home)       She has medicare & bcbs, also wellcare... Last time she tried to get it was $190 and she didn't get it

## 2023-02-03 ENCOUNTER — LAB VISIT (OUTPATIENT)
Dept: LAB | Facility: HOSPITAL | Age: 78
End: 2023-02-03
Attending: INTERNAL MEDICINE
Payer: MEDICARE

## 2023-02-03 DIAGNOSIS — E89.0 POSTOPERATIVE HYPOTHYROIDISM: ICD-10-CM

## 2023-02-03 DIAGNOSIS — N18.32 STAGE 3B CHRONIC KIDNEY DISEASE: ICD-10-CM

## 2023-02-03 DIAGNOSIS — Z00.00 NORMAL PHYSICAL EXAM: ICD-10-CM

## 2023-02-03 DIAGNOSIS — E78.00 PURE HYPERCHOLESTEROLEMIA: ICD-10-CM

## 2023-02-03 LAB
ALBUMIN SERPL BCP-MCNC: 3.9 G/DL (ref 3.5–5.2)
ALP SERPL-CCNC: 81 U/L (ref 55–135)
ALT SERPL W/O P-5'-P-CCNC: 15 U/L (ref 10–44)
ANION GAP SERPL CALC-SCNC: 12 MMOL/L (ref 8–16)
AST SERPL-CCNC: 17 U/L (ref 10–40)
BASOPHILS # BLD AUTO: 0.08 K/UL (ref 0–0.2)
BASOPHILS NFR BLD: 1.4 % (ref 0–1.9)
BILIRUB SERPL-MCNC: 0.5 MG/DL (ref 0.1–1)
BUN SERPL-MCNC: 15 MG/DL (ref 8–23)
CALCIUM SERPL-MCNC: 9.9 MG/DL (ref 8.7–10.5)
CHLORIDE SERPL-SCNC: 104 MMOL/L (ref 95–110)
CHOLEST SERPL-MCNC: 278 MG/DL (ref 120–199)
CHOLEST/HDLC SERPL: 6.3 {RATIO} (ref 2–5)
CO2 SERPL-SCNC: 24 MMOL/L (ref 23–29)
CREAT SERPL-MCNC: 1.3 MG/DL (ref 0.5–1.4)
DIFFERENTIAL METHOD: NORMAL
EOSINOPHIL # BLD AUTO: 0.1 K/UL (ref 0–0.5)
EOSINOPHIL NFR BLD: 2.2 % (ref 0–8)
ERYTHROCYTE [DISTWIDTH] IN BLOOD BY AUTOMATED COUNT: 14.3 % (ref 11.5–14.5)
EST. GFR  (NO RACE VARIABLE): 42.4 ML/MIN/1.73 M^2
GLUCOSE SERPL-MCNC: 95 MG/DL (ref 70–110)
HCT VFR BLD AUTO: 45.3 % (ref 37–48.5)
HDLC SERPL-MCNC: 44 MG/DL (ref 40–75)
HDLC SERPL: 15.8 % (ref 20–50)
HGB BLD-MCNC: 14.5 G/DL (ref 12–16)
IMM GRANULOCYTES # BLD AUTO: 0.02 K/UL (ref 0–0.04)
IMM GRANULOCYTES NFR BLD AUTO: 0.3 % (ref 0–0.5)
LDLC SERPL CALC-MCNC: 187.2 MG/DL (ref 63–159)
LYMPHOCYTES # BLD AUTO: 2.1 K/UL (ref 1–4.8)
LYMPHOCYTES NFR BLD: 35 % (ref 18–48)
MCH RBC QN AUTO: 29.4 PG (ref 27–31)
MCHC RBC AUTO-ENTMCNC: 32 G/DL (ref 32–36)
MCV RBC AUTO: 92 FL (ref 82–98)
MONOCYTES # BLD AUTO: 0.4 K/UL (ref 0.3–1)
MONOCYTES NFR BLD: 7.5 % (ref 4–15)
NEUTROPHILS # BLD AUTO: 3.1 K/UL (ref 1.8–7.7)
NEUTROPHILS NFR BLD: 53.6 % (ref 38–73)
NONHDLC SERPL-MCNC: 234 MG/DL
NRBC BLD-RTO: 0 /100 WBC
PHOSPHATE SERPL-MCNC: 2.7 MG/DL (ref 2.7–4.5)
PLATELET # BLD AUTO: 298 K/UL (ref 150–450)
PMV BLD AUTO: 10.7 FL (ref 9.2–12.9)
POTASSIUM SERPL-SCNC: 3.7 MMOL/L (ref 3.5–5.1)
PROT SERPL-MCNC: 6.9 G/DL (ref 6–8.4)
PTH-INTACT SERPL-MCNC: 98.8 PG/ML (ref 9–77)
RBC # BLD AUTO: 4.94 M/UL (ref 4–5.4)
SODIUM SERPL-SCNC: 140 MMOL/L (ref 136–145)
TRIGL SERPL-MCNC: 234 MG/DL (ref 30–150)
TSH SERPL DL<=0.005 MIU/L-ACNC: 1.69 UIU/ML (ref 0.4–4)
WBC # BLD AUTO: 5.85 K/UL (ref 3.9–12.7)

## 2023-02-03 PROCEDURE — 83970 ASSAY OF PARATHORMONE: CPT | Performed by: INTERNAL MEDICINE

## 2023-02-03 PROCEDURE — 84100 ASSAY OF PHOSPHORUS: CPT | Performed by: INTERNAL MEDICINE

## 2023-02-03 PROCEDURE — 84443 ASSAY THYROID STIM HORMONE: CPT | Performed by: INTERNAL MEDICINE

## 2023-02-03 PROCEDURE — 85025 COMPLETE CBC W/AUTO DIFF WBC: CPT | Performed by: INTERNAL MEDICINE

## 2023-02-03 PROCEDURE — 80053 COMPREHEN METABOLIC PANEL: CPT | Performed by: INTERNAL MEDICINE

## 2023-02-03 PROCEDURE — 36415 COLL VENOUS BLD VENIPUNCTURE: CPT | Mod: PO | Performed by: INTERNAL MEDICINE

## 2023-02-03 PROCEDURE — 80061 LIPID PANEL: CPT | Performed by: INTERNAL MEDICINE

## 2023-02-03 NOTE — PROGRESS NOTES
This note was created by combination of typed  and M-Modal dictation.  Transcription errors may be present.  If there are any questions, please contact me.    Assessment and Plan:   Essential hypertension  Hypokalemia due to loss of potassium  -pre visit labs stable  K stable off of hctz but still taking K Dur  No changes  -     amLODIPine (NORVASC) 5 MG tablet; Take 1 tablet (5 mg total) by mouth once daily.  Dispense: 90 tablet; Refill: 3  -     metoprolol tartrate (LOPRESSOR) 50 MG tablet; Take 1 tablet (50 mg total) by mouth 2 (two) times daily.  Dispense: 180 tablet; Refill: 3  -     potassium chloride SA (K-DUR,KLOR-CON) 20 MEQ tablet; Take 1 tablet (20 mEq total) by mouth 2 (two) times daily.  Dispense: 180 tablet; Refill: 3    Stage 3b chronic kidney disease; avoid hctz due to hyperCa  Hyperparathyroidism  -hyperPTH persisting despite stopping hctz but serum Ca normal. Suspect that this is due to CKD  Stay off of hctz, bp good   Taking otc vit D unsure the dose  Check future labs, I think we can hold off on calcitriol  -     Comprehensive Metabolic Panel; Future; Expected date: 05/06/2023  -     CBC Auto Differential; Future; Expected date: 05/06/2023  -     Lipid Panel; Future; Expected date: 05/06/2023  -     PTH, Intact; Future; Expected date: 05/06/2023  -     Vitamin D; Future; Expected date: 05/06/2023    Pure hypercholesterolemia  -rosuvastatin too expensive.  Trial lipitor 40. Previously tried with myalgias but that was years ago. If intolerant may try her on 1/2 tab daily.  -     atorvastatin (LIPITOR) 40 MG tablet; Take 1 tablet (40 mg total) by mouth once daily.  Dispense: 90 tablet; Refill: 3    Monoclonal gammopathy of undetermined significance  Iron deficiency anemia due to chronic blood loss  AVM (arteriovenous malformation) of colon colonoscopy 2016  -per outside heme/onc. Not taking iron supplement, stable.     Postoperative hypothyroidism  -stable on current dose LT 75  -      levothyroxine (SYNTHROID) 75 MCG tablet; Take 1 tablet (75 mcg total) by mouth before breakfast.  Dispense: 90 tablet; Refill: 3    Tobacco user    Gastroesophageal reflux disease without esophagitis  -longstanding high dose PPI. Symptomatic if does not take  Will challenge on lower dose and see if tolerates  Will call if intolerant/breakthrough  -     pantoprazole (PROTONIX) 20 MG tablet; Take 1 tablet (20 mg total) by mouth once daily.  Dispense: 90 tablet; Refill: 3    Needs flu shot  -     Influenza - Quadrivalent (Adjuvanted)    Missed her appointment for prolia. Will message infusion staff about rescheduling.      Medications Discontinued During This Encounter   Medication Reason    pantoprazole (PROTONIX) 40 MG tablet Dose adjustment    rosuvastatin (CRESTOR) 40 MG Tab Cost of medication    potassium chloride SA (K-DUR,KLOR-CON) 20 MEQ tablet Reorder    metoprolol tartrate (LOPRESSOR) 50 MG tablet Reorder    amLODIPine (NORVASC) 5 MG tablet Reorder    levothyroxine (SYNTHROID) 75 MCG tablet Reorder       meds sent this encounter:  Medications Ordered This Encounter   Medications    amLODIPine (NORVASC) 5 MG tablet     Sig: Take 1 tablet (5 mg total) by mouth once daily.     Dispense:  90 tablet     Refill:  3     .    atorvastatin (LIPITOR) 40 MG tablet     Sig: Take 1 tablet (40 mg total) by mouth once daily.     Dispense:  90 tablet     Refill:  3    levothyroxine (SYNTHROID) 75 MCG tablet     Sig: Take 1 tablet (75 mcg total) by mouth before breakfast.     Dispense:  90 tablet     Refill:  3    metoprolol tartrate (LOPRESSOR) 50 MG tablet     Sig: Take 1 tablet (50 mg total) by mouth 2 (two) times daily.     Dispense:  180 tablet     Refill:  3     .    pantoprazole (PROTONIX) 20 MG tablet     Sig: Take 1 tablet (20 mg total) by mouth once daily.     Dispense:  90 tablet     Refill:  3     Lower dose    potassium chloride SA (K-DUR,KLOR-CON) 20 MEQ tablet     Sig: Take 1 tablet (20 mEq total) by mouth 2  (two) times daily.     Dispense:  180 tablet     Refill:  3       Follow Up: No follow-ups on file. Labs 3 months on new atorva and check vit D and PTH. If labs stable then plan for f/u 6 months from labs    Subjective:     Chief Complaint   Patient presents with    Annual Exam       HPI  Fabienne is a 77 y.o. female.    Social History     Tobacco Use    Smoking status: Some Days     Types: Cigarettes    Smokeless tobacco: Never    Tobacco comments:     patient smokes 1-2 cigarettes when she has a drink   Substance Use Topics    Alcohol use: Yes     Alcohol/week: 1.0 standard drink     Types: 1 Glasses of wine per week     Comment: socially          Social History     Social History Narrative    Not on file       No LMP recorded. Patient is postmenopausal.    Last appointment with this clinic was Visit date not found. Last visit with me 2/4/2022   To summarize last visit and events leading up to today:  Hypertension, CKD stage IIIB, hypokalemia \  Lipid/statin  MGUS followed by outside heme.  Iron deficiency anemia with history of AVM on colonoscopy. Last iron infusion 8/201. Heme.  Hypothyroid on replacement  GERD ppi, else, symptomatic  Osteoporosis needed repeat DEXA scan  HFpEF 12/2017 TTE    Last visit ordered colonoscopy; hx of polyps and hx of AVM  K low normal on K Dur 20 not K Dur 40 as rx'd. Increased to 40  Ordered DEXA. Hx of reclast. If osteoporosis still present needs to see endo with CKD    TSH low FT4 WNL. On LT 88  Renal panel with CKD stage 3a seen previous  Calcium mild elevated, new  PTH mild elevated, no previous for comparison  LFTs normal  Lipid TG high seen previous; on statin  CBC WNL  Would decrease to 75  05/21/2022 DEXA scan L-spine-3.6, total hip-2.7, femoral neck -2.4. FRAX score 11/25%.osteoporosis  9/2018 vit D good  Last TSH was low, FT4 WNL, on LT  CKD stage 3a  PTH mild elevated no previous for comparison  Previous DEXA scan 2016 with osteoporosis but no parameters provided    Saw  heme/onc 5/2022. Stable.    Saw endo  Start Prolia.  hyperPTH felt to be due to CKD. Stop HCTZ. Repeat 6 months.     TSH good on 75  PTH high but stopped hctz a few weeks prior to labs.     Pre visit labs  CBC normal   CMP CKD stage IIIB seen previously  Lipid very high, last check was in normal range   PTH high   TSH normal    Today's visit:  Is filing for divorce.  After 46 years.  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     Her rosuvastatin increased in price. $100+   Previously on lipitor with flu like symptoms  But would be willing to rechallenge again.    Taking otc vit d unclear the dose.      Taking PPI daily, else, symptoms.  On high dose longstanding.    Last prolia injectino was in June  Was not aware of appointment for prolia 12/22  Eats dairy    Iron deficiency managed by outside [facility]. Has not required iron infusion in a while now.     Patient Care Team:  Gordo Canela MD as PCP - General (Internal Medicine)  Geronimo Ugarte MD (Rheumatology)  Romulo Fung MD as Consulting Physician (Hematology and Oncology)  Lori Mccarthy MD (Dermatology)  Timbo Greene MD as Consulting Physician (Ophthalmology)  Jesús Berkowitz (Obstetrics and Gynecology)  Keara Hernandez MA as Care Coordinator  Geronimo Bueno MD (Gastroenterology)  Ger Barclay MD as Consulting Physician (Orthopedic Surgery)    Patient Active Problem List    Diagnosis Date Noted    Tobacco user 06/08/2022    Hyperparathyroidism 05/10/2022    Stage 3b chronic kidney disease; avoid hctz due to hyperCa 12/06/2018    Cataract of left eye 12/06/2018    Hypokalemia due to loss of potassium 09/14/2018    Erosive osteoarthritis hx of HCQ; rheumatology. 09/10/2018    AVM (arteriovenous malformation) of colon colonoscopy 2016 12/11/2017     Colonoscopy 2016 had AVM which was coagulated.       Pure hypercholesterolemia 12/08/2017    Iron deficiency anemia 12/08/2017     iron deficiency anemia that dates back to her teenage years. She states that she has been anemic most of her adult life. She was followed by Dr. Kristy Ray for the final 3-4 years of Dr. Yu career and then she was followed by Dr. Angie Atkins for the past two years prior to establishing with Dr. Fung in February, 2016. Prior to her initial visits with Dr. Ray, the patient had been seen by a hematologist in [place] in the 1990s because at the time of a thyroidectomy, she was found to be so severely anemic that she required 14 months of injections of iron in order to get her to an acceptable level of hemoglobin and hematocrit prior to her thyroidectomy. During her timeframe of follow-up with Dr. aRy, she was treated with only oral iron. Over the two years of follow-up with Dr. Atkins through December 2015, she was given approximately eight courses of IV iron believed to be Injectafer.   She was seen in clinic on 02/19/2016 for initial consultation regarding iron deficiency anemia and evaluation of underlying etiology. Laboratory analysis was ordered at that time for reevaluation of iron deficiency along with reevaluation of the presence of previously identified M-protein. She was also referred to Gastroenterology (Dr. Jamie Martinez) for evaluation of GI blood loss as potential underlying etiology of longstanding iron deficiency. Secondary to her identified iron deficiency she received IV iron replacement in the form of Injectafer 750 mg x 2 doses (3/21/2016 and 03/28/2016).   7/2017: Extraordinarily responsive to minimal doses of IV iron. No evidence of significant anemia today. Ferritin is at low limit of normal. Recheck blood work in 3 months and keep follow-up at 6 months.      Monoclonal gammopathy of undetermined significance 12/08/2017     Dr. Fung in [place]      Neutrophilic leukocytosis 11/28/2017    Essential  hypertension 11/28/2017 12/2017 TTE showed preserved EF with diastolic CHF.      History of gram negative sepsis urosepsis 11/2017 11/28/2017     Hospitalization 12/2017  CT of the abdomen and pelvis were performed which was concerning for possible perinephric abscess.  US kidney showed no sign of abscess  Allergic rxn to zosyn.  Did not react to penicillin, so NOT a penicillin allergy.  positive blood culture for Ecoli,source UTI      Age-related osteoporosis without current pathological fracture 03/14/2017 05/21/2022 DEXA scan L-spine-3.6, total hip-2.7, femoral neck -2.4.  FRAX score 11/25%.osteoporosis  9/2018 vit D good  Last TSH was low, FT4 WNL, on LT  CKD stage 3a  PTH mild elevated no previous for comparison      Asymptomatic cholelithiasis 04/20/2016    Postoperative hypothyroidism 02/12/2015    Gastroesophageal reflux disease without esophagitis 02/12/2015      Upper gastrointestinal endoscopy 04/27/2006 - gastritis, hernia    Upper gastrointestinal endoscopy 04/27/2006    Upper gastrointestinal endoscopy 03/29/2016          PAST MEDICAL PROBLEMS, PAST SURGICAL HISTORY: please see relevant portions of the electronic medical record    ALLERGIES AND MEDICATIONS: updated and reviewed.  Review of patient's allergies indicates:   Allergen Reactions    Meperidine     Statins-hmg-coa reductase inhibitors      Flu like symptoms.       Medication List with Changes/Refills   Current Medications    AMLODIPINE (NORVASC) 5 MG TABLET    Take 1 tablet (5 mg total) by mouth once daily.    FISH OIL-OMEGA-3 FATTY ACIDS 300-1,000 MG CAPSULE    Take 2 g by mouth once daily.    FLUTICASONE (FLONASE) 50 MCG/ACTUATION NASAL SPRAY    1 spray by Each Nare route once daily.    GABAPENTIN (NEURONTIN) 300 MG CAPSULE    Take 300 mg by mouth 2 (two) times daily.    LEVOTHYROXINE (SYNTHROID) 75 MCG TABLET    TAKE 1 TABLET (75 MCG TOTAL) BY MOUTH BEFORE BREAKFAST. REPEAT TSH 2 MONTHS    METOPROLOL TARTRATE (LOPRESSOR) 50 MG  "TABLET    Take 1 tablet (50 mg total) by mouth 2 (two) times daily.    MULTIVITAMIN CAPSULE    Take 1 capsule by mouth once daily.    PANTOPRAZOLE (PROTONIX) 40 MG TABLET    Take 1 tablet (40 mg total) by mouth once daily.    POTASSIUM CHLORIDE SA (K-DUR,KLOR-CON) 20 MEQ TABLET    TAKE 1 TABLET BY MOUTH TWICE A DAY    ROSUVASTATIN (CRESTOR) 40 MG TAB    Take 1 tablet (40 mg total) by mouth every evening.        Objective:   Physical Exam   Vitals:    02/06/23 1010   Pulse: 75   Temp: 97.6 °F (36.4 °C)   TempSrc: Oral   SpO2: 96%   Weight: 80 kg (176 lb 5.9 oz)   Height: 5' 6.5" (1.689 m)    Body mass index is 28.04 kg/m².  Weight: 80 kg (176 lb 5.9 oz)   Height: 5' 6.5" (168.9 cm)     Physical Exam  Constitutional:       General: She is not in acute distress.     Appearance: She is well-developed.   Eyes:      Extraocular Movements: Extraocular movements intact.   Cardiovascular:      Rate and Rhythm: Normal rate and regular rhythm.      Heart sounds: Normal heart sounds. No murmur heard.  Pulmonary:      Effort: Pulmonary effort is normal.      Breath sounds: Normal breath sounds.   Musculoskeletal:         General: Normal range of motion.      Right lower leg: No edema.      Left lower leg: No edema.   Skin:     General: Skin is warm and dry.   Neurological:      Mental Status: She is alert and oriented to person, place, and time.      Coordination: Coordination normal.   Psychiatric:         Behavior: Behavior normal.         Thought Content: Thought content normal.       Component      Latest Ref Rng & Units 2/3/2023 6/22/2022   WBC      3.90 - 12.70 K/uL 5.85    RBC      4.00 - 5.40 M/uL 4.94    Hemoglobin      12.0 - 16.0 g/dL 14.5    Hematocrit      37.0 - 48.5 % 45.3    MCV      82 - 98 fL 92    MCH      27.0 - 31.0 pg 29.4    MCHC      32.0 - 36.0 g/dL 32.0    RDW      11.5 - 14.5 % 14.3    Platelets      150 - 450 K/uL 298    MPV      9.2 - 12.9 fL 10.7    Immature Granulocytes      0.0 - 0.5 % 0.3    Gran " # (ANC)      1.8 - 7.7 K/uL 3.1    Immature Grans (Abs)      0.00 - 0.04 K/uL 0.02    Lymph #      1.0 - 4.8 K/uL 2.1    Mono #      0.3 - 1.0 K/uL 0.4    Eos #      0.0 - 0.5 K/uL 0.1    Baso #      0.00 - 0.20 K/uL 0.08    nRBC      0 /100 WBC 0    Gran %      38.0 - 73.0 % 53.6    Lymph %      18.0 - 48.0 % 35.0    Mono %      4.0 - 15.0 % 7.5    Eosinophil %      0.0 - 8.0 % 2.2    Basophil %      0.0 - 1.9 % 1.4    Differential Method       Automated    Sodium      136 - 145 mmol/L 140 145   Potassium      3.5 - 5.1 mmol/L 3.7 4.2   Chloride      95 - 110 mmol/L 104 110   CO2      23 - 29 mmol/L 24 26   Glucose      70 - 110 mg/dL 95 90   BUN      8 - 23 mg/dL 15 22   Creatinine      0.5 - 1.4 mg/dL 1.3 1.4   Calcium      8.7 - 10.5 mg/dL 9.9 9.4   PROTEIN TOTAL      6.0 - 8.4 g/dL 6.9 6.7   Albumin      3.5 - 5.2 g/dL 3.9 3.8   BILIRUBIN TOTAL      0.1 - 1.0 mg/dL 0.5 0.4   Alkaline Phosphatase      55 - 135 U/L 81 62   AST      10 - 40 U/L 17 16   ALT      10 - 44 U/L 15 16   Anion Gap      8 - 16 mmol/L 12 9   eGFR if African American      >60 mL/min/1.73 m:2  42 (A)   eGFR if non African American      >60 mL/min/1.73 m:2  36 (A)   eGFR      >60 mL/min/1.73 m:2 42.4 (A)    Cholesterol      120 - 199 mg/dL 278 (H)    Triglycerides      30 - 150 mg/dL 234 (H)    HDL      40 - 75 mg/dL 44    LDL Cholesterol External      63.0 - 159.0 mg/dL 187.2 (H)    HDL/Cholesterol Ratio      20.0 - 50.0 % 15.8 (L)    Total Cholesterol/HDL Ratio      2.0 - 5.0 6.3 (H)    Non-HDL Cholesterol      mg/dL 234    TSH      0.400 - 4.000 uIU/mL 1.693 1.840   Vit D, 25-Hydroxy      30 - 96 ng/mL  40   PTH      9.0 - 77.0 pg/mL 98.8 (H) 112.3 (H)   Free T4      0.71 - 1.51 ng/dL  1.33   Phosphorus      2.7 - 4.5 mg/dL 2.7

## 2023-02-06 ENCOUNTER — OFFICE VISIT (OUTPATIENT)
Dept: FAMILY MEDICINE | Facility: CLINIC | Age: 78
End: 2023-02-06
Payer: MEDICARE

## 2023-02-06 VITALS
BODY MASS INDEX: 27.68 KG/M2 | TEMPERATURE: 98 F | SYSTOLIC BLOOD PRESSURE: 102 MMHG | OXYGEN SATURATION: 96 % | WEIGHT: 176.38 LBS | HEIGHT: 67 IN | HEART RATE: 75 BPM | DIASTOLIC BLOOD PRESSURE: 68 MMHG

## 2023-02-06 DIAGNOSIS — D47.2 MONOCLONAL GAMMOPATHY OF UNDETERMINED SIGNIFICANCE: ICD-10-CM

## 2023-02-06 DIAGNOSIS — E21.3 HYPERPARATHYROIDISM: ICD-10-CM

## 2023-02-06 DIAGNOSIS — E89.0 POSTOPERATIVE HYPOTHYROIDISM: ICD-10-CM

## 2023-02-06 DIAGNOSIS — I10 ESSENTIAL HYPERTENSION: Primary | ICD-10-CM

## 2023-02-06 DIAGNOSIS — K21.9 GASTROESOPHAGEAL REFLUX DISEASE WITHOUT ESOPHAGITIS: ICD-10-CM

## 2023-02-06 DIAGNOSIS — N18.32 STAGE 3B CHRONIC KIDNEY DISEASE: ICD-10-CM

## 2023-02-06 DIAGNOSIS — Z72.0 TOBACCO USER: ICD-10-CM

## 2023-02-06 DIAGNOSIS — Z23 NEEDS FLU SHOT: ICD-10-CM

## 2023-02-06 DIAGNOSIS — D50.0 IRON DEFICIENCY ANEMIA DUE TO CHRONIC BLOOD LOSS: ICD-10-CM

## 2023-02-06 DIAGNOSIS — E87.6 HYPOKALEMIA DUE TO LOSS OF POTASSIUM: ICD-10-CM

## 2023-02-06 DIAGNOSIS — K55.20 AVM (ARTERIOVENOUS MALFORMATION) OF COLON: ICD-10-CM

## 2023-02-06 DIAGNOSIS — E78.00 PURE HYPERCHOLESTEROLEMIA: ICD-10-CM

## 2023-02-06 PROCEDURE — 99214 OFFICE O/P EST MOD 30 MIN: CPT | Mod: S$PBB,,, | Performed by: INTERNAL MEDICINE

## 2023-02-06 PROCEDURE — G0008 ADMIN INFLUENZA VIRUS VAC: HCPCS | Mod: PBBFAC,PO

## 2023-02-06 PROCEDURE — 99213 OFFICE O/P EST LOW 20 MIN: CPT | Mod: PBBFAC,PO | Performed by: INTERNAL MEDICINE

## 2023-02-06 PROCEDURE — 99999 PR PBB SHADOW E&M-EST. PATIENT-LVL III: ICD-10-PCS | Mod: PBBFAC,,, | Performed by: INTERNAL MEDICINE

## 2023-02-06 PROCEDURE — 99214 PR OFFICE/OUTPT VISIT, EST, LEVL IV, 30-39 MIN: ICD-10-PCS | Mod: S$PBB,,, | Performed by: INTERNAL MEDICINE

## 2023-02-06 PROCEDURE — 99999 PR PBB SHADOW E&M-EST. PATIENT-LVL III: CPT | Mod: PBBFAC,,, | Performed by: INTERNAL MEDICINE

## 2023-02-06 RX ORDER — PANTOPRAZOLE SODIUM 20 MG/1
20 TABLET, DELAYED RELEASE ORAL DAILY
Qty: 90 TABLET | Refills: 3 | Status: SHIPPED | OUTPATIENT
Start: 2023-02-06 | End: 2023-12-12

## 2023-02-06 RX ORDER — AMLODIPINE BESYLATE 5 MG/1
5 TABLET ORAL DAILY
Qty: 90 TABLET | Refills: 3 | Status: SHIPPED | OUTPATIENT
Start: 2023-02-06 | End: 2024-02-06

## 2023-02-06 RX ORDER — POTASSIUM CHLORIDE 20 MEQ/1
20 TABLET, EXTENDED RELEASE ORAL 2 TIMES DAILY
Qty: 180 TABLET | Refills: 3 | Status: SHIPPED | OUTPATIENT
Start: 2023-02-06

## 2023-02-06 RX ORDER — ATORVASTATIN CALCIUM 40 MG/1
40 TABLET, FILM COATED ORAL DAILY
Qty: 90 TABLET | Refills: 3 | Status: SHIPPED | OUTPATIENT
Start: 2023-02-06 | End: 2024-02-06

## 2023-02-06 RX ORDER — METOPROLOL TARTRATE 50 MG/1
50 TABLET ORAL 2 TIMES DAILY
Qty: 180 TABLET | Refills: 3 | Status: SHIPPED | OUTPATIENT
Start: 2023-02-06

## 2023-02-06 RX ORDER — LEVOTHYROXINE SODIUM 75 UG/1
75 TABLET ORAL
Qty: 90 TABLET | Refills: 3 | Status: SHIPPED | OUTPATIENT
Start: 2023-02-06

## 2023-02-07 ENCOUNTER — TELEPHONE (OUTPATIENT)
Dept: FAMILY MEDICINE | Facility: CLINIC | Age: 78
End: 2023-02-07
Payer: MEDICARE

## 2023-02-07 NOTE — TELEPHONE ENCOUNTER
----- Message from Jessica De sent at 2/7/2023 10:26 AM CST -----  Type: Patient Call Back    Who called:self    What is the request in detail:pt received the flu vaccine of 2/6 and woke up this morning feeling sick. She wants to know if that is normal or if she is actually sick. Please call    Can the clinic reply by [facility]?    Would the patient rather a call back or a response via [facility]? call    Best call back number:[phone] (home)

## 2023-02-07 NOTE — TELEPHONE ENCOUNTER
"Call placed to patient who states they are having sinus problems and it seems to have "come on" after receiving the influenza vaccine 2-6-23. Asked of patient if they were experiencing any of the following symptoms: high grade fever, trouble breathing or wheezing, vision changes, hives or rash have severe dizziness or fainting all are denied by patient. Assured patient there may be a reacation to the vaccine as it is introducing itself to there system. Also emphasized to seek assistance if any of the above mentioned symptoms occur. Verbalized understanding and thank you.   "

## 2023-03-28 ENCOUNTER — PES CALL (OUTPATIENT)
Dept: ADMINISTRATIVE | Facility: CLINIC | Age: 78
End: 2023-03-28
Payer: MEDICARE

## 2023-04-21 DIAGNOSIS — I10 ESSENTIAL HYPERTENSION: ICD-10-CM

## 2023-04-21 DIAGNOSIS — K21.9 GASTROESOPHAGEAL REFLUX DISEASE WITHOUT ESOPHAGITIS: ICD-10-CM

## 2023-04-21 RX ORDER — HYDROCHLOROTHIAZIDE 25 MG/1
TABLET ORAL
Qty: 90 TABLET | Refills: 3 | OUTPATIENT
Start: 2023-04-21

## 2023-04-21 RX ORDER — PANTOPRAZOLE SODIUM 40 MG/1
TABLET, DELAYED RELEASE ORAL
Qty: 90 TABLET | Refills: 3 | OUTPATIENT
Start: 2023-04-21

## 2023-04-21 NOTE — TELEPHONE ENCOUNTER
No new care gaps identified.  [facility] Embedded Care Gaps. Reference number: [id number]. 4/21/2023   1:58:20 PM CDT

## 2023-04-21 NOTE — TELEPHONE ENCOUNTER
Refill Decision Note   Fabienne Goncalves  is requesting a refill authorization.  Brief Assessment and Rationale for Refill:  Quick Discontinue     Medication Therapy Plan:  Pantoprazole decreased to 20mg daily(2/6/23); HCTZ dc;d (6/8/22); Quick dc      Comments:     Note composed:5:02 PM 04/21/2023

## 2023-04-26 ENCOUNTER — PES CALL (OUTPATIENT)
Dept: ADMINISTRATIVE | Facility: CLINIC | Age: 78
End: 2023-04-26
Payer: MEDICARE

## 2023-05-08 ENCOUNTER — LAB VISIT (OUTPATIENT)
Dept: LAB | Facility: HOSPITAL | Age: 78
End: 2023-05-08
Attending: HOSPITALIST
Payer: MEDICARE

## 2023-05-08 DIAGNOSIS — E89.0 POSTOPERATIVE HYPOTHYROIDISM: ICD-10-CM

## 2023-05-08 DIAGNOSIS — E21.3 HYPERPARATHYROIDISM: ICD-10-CM

## 2023-05-08 DIAGNOSIS — N18.32 STAGE 3B CHRONIC KIDNEY DISEASE: ICD-10-CM

## 2023-05-08 LAB
25(OH)D3+25(OH)D2 SERPL-MCNC: 34 NG/ML (ref 30–96)
ALBUMIN SERPL BCP-MCNC: 3.7 G/DL (ref 3.5–5.2)
ALP SERPL-CCNC: 78 U/L (ref 55–135)
ALT SERPL W/O P-5'-P-CCNC: 28 U/L (ref 10–44)
ANION GAP SERPL CALC-SCNC: 12 MMOL/L (ref 8–16)
AST SERPL-CCNC: 24 U/L (ref 10–40)
BASOPHILS # BLD AUTO: 0.06 K/UL (ref 0–0.2)
BASOPHILS NFR BLD: 1.1 % (ref 0–1.9)
BILIRUB SERPL-MCNC: 0.4 MG/DL (ref 0.1–1)
BUN SERPL-MCNC: 20 MG/DL (ref 8–23)
CALCIUM SERPL-MCNC: 9.8 MG/DL (ref 8.7–10.5)
CHLORIDE SERPL-SCNC: 105 MMOL/L (ref 95–110)
CHOLEST SERPL-MCNC: 159 MG/DL (ref 120–199)
CHOLEST/HDLC SERPL: 3.2 {RATIO} (ref 2–5)
CO2 SERPL-SCNC: 25 MMOL/L (ref 23–29)
CREAT SERPL-MCNC: 1.2 MG/DL (ref 0.5–1.4)
DIFFERENTIAL METHOD: ABNORMAL
EOSINOPHIL # BLD AUTO: 0.1 K/UL (ref 0–0.5)
EOSINOPHIL NFR BLD: 2.5 % (ref 0–8)
ERYTHROCYTE [DISTWIDTH] IN BLOOD BY AUTOMATED COUNT: 15.6 % (ref 11.5–14.5)
EST. GFR  (NO RACE VARIABLE): 46.6 ML/MIN/1.73 M^2
GLUCOSE SERPL-MCNC: 89 MG/DL (ref 70–110)
HCT VFR BLD AUTO: 43.4 % (ref 37–48.5)
HDLC SERPL-MCNC: 49 MG/DL (ref 40–75)
HDLC SERPL: 30.8 % (ref 20–50)
HGB BLD-MCNC: 13.3 G/DL (ref 12–16)
IMM GRANULOCYTES # BLD AUTO: 0.02 K/UL (ref 0–0.04)
IMM GRANULOCYTES NFR BLD AUTO: 0.4 % (ref 0–0.5)
LDLC SERPL CALC-MCNC: 77.2 MG/DL (ref 63–159)
LYMPHOCYTES # BLD AUTO: 1.5 K/UL (ref 1–4.8)
LYMPHOCYTES NFR BLD: 28 % (ref 18–48)
MCH RBC QN AUTO: 28.1 PG (ref 27–31)
MCHC RBC AUTO-ENTMCNC: 30.6 G/DL (ref 32–36)
MCV RBC AUTO: 92 FL (ref 82–98)
MONOCYTES # BLD AUTO: 0.5 K/UL (ref 0.3–1)
MONOCYTES NFR BLD: 10.3 % (ref 4–15)
NEUTROPHILS # BLD AUTO: 3 K/UL (ref 1.8–7.7)
NEUTROPHILS NFR BLD: 57.7 % (ref 38–73)
NONHDLC SERPL-MCNC: 110 MG/DL
NRBC BLD-RTO: 0 /100 WBC
PLATELET # BLD AUTO: 275 K/UL (ref 150–450)
PMV BLD AUTO: 11.1 FL (ref 9.2–12.9)
POTASSIUM SERPL-SCNC: 4 MMOL/L (ref 3.5–5.1)
PROT SERPL-MCNC: 6.3 G/DL (ref 6–8.4)
PTH-INTACT SERPL-MCNC: 100.2 PG/ML (ref 9–77)
RBC # BLD AUTO: 4.74 M/UL (ref 4–5.4)
SODIUM SERPL-SCNC: 142 MMOL/L (ref 136–145)
TRIGL SERPL-MCNC: 164 MG/DL (ref 30–150)
TSH SERPL DL<=0.005 MIU/L-ACNC: 3.33 UIU/ML (ref 0.4–4)
WBC # BLD AUTO: 5.22 K/UL (ref 3.9–12.7)

## 2023-05-08 PROCEDURE — 83970 ASSAY OF PARATHORMONE: CPT | Performed by: INTERNAL MEDICINE

## 2023-05-08 PROCEDURE — 36415 COLL VENOUS BLD VENIPUNCTURE: CPT | Mod: PO | Performed by: INTERNAL MEDICINE

## 2023-05-08 PROCEDURE — 80061 LIPID PANEL: CPT | Performed by: INTERNAL MEDICINE

## 2023-05-08 PROCEDURE — 82306 VITAMIN D 25 HYDROXY: CPT | Performed by: INTERNAL MEDICINE

## 2023-05-08 PROCEDURE — 84443 ASSAY THYROID STIM HORMONE: CPT | Performed by: HOSPITALIST

## 2023-05-08 PROCEDURE — 80053 COMPREHEN METABOLIC PANEL: CPT | Performed by: INTERNAL MEDICINE

## 2023-05-08 PROCEDURE — 85025 COMPLETE CBC W/AUTO DIFF WBC: CPT | Performed by: INTERNAL MEDICINE

## 2023-05-10 DIAGNOSIS — E89.0 POSTOPERATIVE HYPOTHYROIDISM: ICD-10-CM

## 2023-05-10 DIAGNOSIS — E21.3 HYPERPARATHYROIDISM: ICD-10-CM

## 2023-05-10 DIAGNOSIS — I10 ESSENTIAL HYPERTENSION: ICD-10-CM

## 2023-05-10 DIAGNOSIS — N18.32 STAGE 3B CHRONIC KIDNEY DISEASE: ICD-10-CM

## 2023-05-10 DIAGNOSIS — E87.6 HYPOKALEMIA DUE TO LOSS OF POTASSIUM: ICD-10-CM

## 2023-05-10 DIAGNOSIS — E78.00 PURE HYPERCHOLESTEROLEMIA: Primary | ICD-10-CM

## 2023-05-10 NOTE — PROGRESS NOTES
CMP with CKD stage IIIA seen previously   Lipid much better presumably on Lipitor   PTH high seen previously  Vitamin-D normal   CBC normal    TSH normal on current dose     Upcoming follow-up with endocrinology   Has appointment with outside Heme-Onc   Results to patient.  Follow-up 6 months with labs

## 2023-06-05 ENCOUNTER — PES CALL (OUTPATIENT)
Dept: ADMINISTRATIVE | Facility: CLINIC | Age: 78
End: 2023-06-05
Payer: MEDICARE

## 2023-06-23 ENCOUNTER — INFUSION (OUTPATIENT)
Dept: INFUSION THERAPY | Facility: HOSPITAL | Age: 78
End: 2023-06-23
Attending: HOSPITALIST
Payer: MEDICARE

## 2023-06-23 VITALS
RESPIRATION RATE: 18 BRPM | SYSTOLIC BLOOD PRESSURE: 127 MMHG | DIASTOLIC BLOOD PRESSURE: 60 MMHG | OXYGEN SATURATION: 96 % | HEART RATE: 77 BPM | TEMPERATURE: 98 F

## 2023-06-23 DIAGNOSIS — M81.0 AGE-RELATED OSTEOPOROSIS WITHOUT CURRENT PATHOLOGICAL FRACTURE: Primary | ICD-10-CM

## 2023-06-23 PROCEDURE — 63600175 PHARM REV CODE 636 W HCPCS: Mod: JZ,JG | Performed by: HOSPITALIST

## 2023-06-23 PROCEDURE — 96372 THER/PROPH/DIAG INJ SC/IM: CPT

## 2023-06-23 RX ADMIN — DENOSUMAB 60 MG: 60 INJECTION SUBCUTANEOUS at 09:06

## 2023-07-28 ENCOUNTER — PES CALL (OUTPATIENT)
Dept: ADMINISTRATIVE | Facility: CLINIC | Age: 78
End: 2023-07-28
Payer: MEDICARE

## 2023-10-11 ENCOUNTER — HOSPITAL ENCOUNTER (EMERGENCY)
Facility: HOSPITAL | Age: 78
Discharge: HOME | End: 2023-10-11
Attending: STUDENT IN AN ORGANIZED HEALTH CARE EDUCATION/TRAINING PROGRAM
Payer: MEDICARE

## 2023-10-11 VITALS
DIASTOLIC BLOOD PRESSURE: 78 MMHG | BODY MASS INDEX: 28.28 KG/M2 | SYSTOLIC BLOOD PRESSURE: 121 MMHG | OXYGEN SATURATION: 95 % | TEMPERATURE: 99 F | RESPIRATION RATE: 20 BRPM | HEART RATE: 99 BPM | WEIGHT: 176 LBS | HEIGHT: 66 IN

## 2023-10-11 DIAGNOSIS — M25.559 HIP PAIN: ICD-10-CM

## 2023-10-11 DIAGNOSIS — S32.120A CLOSED NONDISPLACED ZONE II FRACTURE OF SACRUM, INITIAL ENCOUNTER: Primary | ICD-10-CM

## 2023-10-11 DIAGNOSIS — R11.10 VOMITING: ICD-10-CM

## 2023-10-11 DIAGNOSIS — S32.591A CLOSED FRACTURE OF RAMUS OF RIGHT PUBIS, INITIAL ENCOUNTER: ICD-10-CM

## 2023-10-11 LAB
ALBUMIN SERPL BCP-MCNC: 3.6 G/DL (ref 3.5–5.2)
ALP SERPL-CCNC: 116 U/L (ref 55–135)
ALT SERPL W/O P-5'-P-CCNC: 22 U/L (ref 10–44)
ANION GAP SERPL CALC-SCNC: 8 MMOL/L (ref 8–16)
AST SERPL-CCNC: 22 U/L (ref 10–40)
BASOPHILS # BLD AUTO: 0.05 K/UL (ref 0–0.2)
BASOPHILS NFR BLD: 0.5 % (ref 0–1.9)
BILIRUB SERPL-MCNC: 0.5 MG/DL (ref 0.1–1)
BILIRUB UR QL STRIP: NEGATIVE
BUN SERPL-MCNC: 18 MG/DL (ref 8–23)
CALCIUM SERPL-MCNC: 8.4 MG/DL (ref 8.7–10.5)
CHLORIDE SERPL-SCNC: 110 MMOL/L (ref 95–110)
CLARITY UR: CLEAR
CO2 SERPL-SCNC: 21 MMOL/L (ref 23–29)
COLOR UR: YELLOW
CREAT SERPL-MCNC: 1 MG/DL (ref 0.5–1.4)
DIFFERENTIAL METHOD: ABNORMAL
EOSINOPHIL # BLD AUTO: 0.2 K/UL (ref 0–0.5)
EOSINOPHIL NFR BLD: 1.6 % (ref 0–8)
ERYTHROCYTE [DISTWIDTH] IN BLOOD BY AUTOMATED COUNT: 14.5 % (ref 11.5–14.5)
EST. GFR  (NO RACE VARIABLE): 58 ML/MIN/1.73 M^2
GLUCOSE SERPL-MCNC: 101 MG/DL (ref 70–110)
GLUCOSE UR QL STRIP: NEGATIVE
HCT VFR BLD AUTO: 38.9 % (ref 37–48.5)
HGB BLD-MCNC: 12.9 G/DL (ref 12–16)
HGB UR QL STRIP: NEGATIVE
IMM GRANULOCYTES # BLD AUTO: 0.04 K/UL (ref 0–0.04)
IMM GRANULOCYTES NFR BLD AUTO: 0.4 % (ref 0–0.5)
KETONES UR QL STRIP: ABNORMAL
LEUKOCYTE ESTERASE UR QL STRIP: NEGATIVE
LIPASE SERPL-CCNC: 20 U/L (ref 4–60)
LYMPHOCYTES # BLD AUTO: 1.1 K/UL (ref 1–4.8)
LYMPHOCYTES NFR BLD: 11.3 % (ref 18–48)
MAGNESIUM SERPL-MCNC: 2 MG/DL (ref 1.6–2.6)
MCH RBC QN AUTO: 29.9 PG (ref 27–31)
MCHC RBC AUTO-ENTMCNC: 33.2 G/DL (ref 32–36)
MCV RBC AUTO: 90 FL (ref 82–98)
MONOCYTES # BLD AUTO: 0.7 K/UL (ref 0.3–1)
MONOCYTES NFR BLD: 7 % (ref 4–15)
NEUTROPHILS # BLD AUTO: 7.6 K/UL (ref 1.8–7.7)
NEUTROPHILS NFR BLD: 79.2 % (ref 38–73)
NITRITE UR QL STRIP: NEGATIVE
NRBC BLD-RTO: 0 /100 WBC
PH UR STRIP: 6 [PH] (ref 5–8)
PHOSPHATE SERPL-MCNC: 1.8 MG/DL (ref 2.7–4.5)
PLATELET # BLD AUTO: 272 K/UL (ref 150–450)
PMV BLD AUTO: 9.8 FL (ref 9.2–12.9)
POTASSIUM SERPL-SCNC: 4.2 MMOL/L (ref 3.5–5.1)
PROT SERPL-MCNC: 6.3 G/DL (ref 6–8.4)
PROT UR QL STRIP: NEGATIVE
RBC # BLD AUTO: 4.31 M/UL (ref 4–5.4)
SODIUM SERPL-SCNC: 139 MMOL/L (ref 136–145)
SP GR UR STRIP: 1.02 (ref 1–1.03)
URN SPEC COLLECT METH UR: ABNORMAL
UROBILINOGEN UR STRIP-ACNC: NEGATIVE EU/DL
WBC # BLD AUTO: 9.57 K/UL (ref 3.9–12.7)

## 2023-10-11 PROCEDURE — 84100 ASSAY OF PHOSPHORUS: CPT | Performed by: STUDENT IN AN ORGANIZED HEALTH CARE EDUCATION/TRAINING PROGRAM

## 2023-10-11 PROCEDURE — 83690 ASSAY OF LIPASE: CPT | Performed by: STUDENT IN AN ORGANIZED HEALTH CARE EDUCATION/TRAINING PROGRAM

## 2023-10-11 PROCEDURE — 99285 EMERGENCY DEPT VISIT HI MDM: CPT | Mod: 25

## 2023-10-11 PROCEDURE — 93010 ELECTROCARDIOGRAM REPORT: CPT | Mod: ,,, | Performed by: INTERNAL MEDICINE

## 2023-10-11 PROCEDURE — 85025 COMPLETE CBC W/AUTO DIFF WBC: CPT | Performed by: STUDENT IN AN ORGANIZED HEALTH CARE EDUCATION/TRAINING PROGRAM

## 2023-10-11 PROCEDURE — 96365 THER/PROPH/DIAG IV INF INIT: CPT

## 2023-10-11 PROCEDURE — 81003 URINALYSIS AUTO W/O SCOPE: CPT | Performed by: STUDENT IN AN ORGANIZED HEALTH CARE EDUCATION/TRAINING PROGRAM

## 2023-10-11 PROCEDURE — 93010 EKG 12-LEAD: ICD-10-PCS | Mod: ,,, | Performed by: INTERNAL MEDICINE

## 2023-10-11 PROCEDURE — 63600175 PHARM REV CODE 636 W HCPCS: Performed by: STUDENT IN AN ORGANIZED HEALTH CARE EDUCATION/TRAINING PROGRAM

## 2023-10-11 PROCEDURE — 96375 TX/PRO/DX INJ NEW DRUG ADDON: CPT

## 2023-10-11 PROCEDURE — 25500020 PHARM REV CODE 255: Performed by: STUDENT IN AN ORGANIZED HEALTH CARE EDUCATION/TRAINING PROGRAM

## 2023-10-11 PROCEDURE — 80053 COMPREHEN METABOLIC PANEL: CPT | Performed by: STUDENT IN AN ORGANIZED HEALTH CARE EDUCATION/TRAINING PROGRAM

## 2023-10-11 PROCEDURE — 83735 ASSAY OF MAGNESIUM: CPT | Performed by: STUDENT IN AN ORGANIZED HEALTH CARE EDUCATION/TRAINING PROGRAM

## 2023-10-11 PROCEDURE — 25000003 PHARM REV CODE 250: Performed by: STUDENT IN AN ORGANIZED HEALTH CARE EDUCATION/TRAINING PROGRAM

## 2023-10-11 PROCEDURE — 93005 ELECTROCARDIOGRAM TRACING: CPT

## 2023-10-11 RX ORDER — HYDROMORPHONE HYDROCHLORIDE 1 MG/ML
0.5 INJECTION, SOLUTION INTRAMUSCULAR; INTRAVENOUS; SUBCUTANEOUS
Status: COMPLETED | OUTPATIENT
Start: 2023-10-11 | End: 2023-10-11

## 2023-10-11 RX ORDER — ONDANSETRON 4 MG/1
4 TABLET, ORALLY DISINTEGRATING ORAL EVERY 6 HOURS PRN
Qty: 12 TABLET | Refills: 0 | Status: SHIPPED | OUTPATIENT
Start: 2023-10-11 | End: 2023-10-14

## 2023-10-11 RX ORDER — ONDANSETRON 2 MG/ML
4 INJECTION INTRAMUSCULAR; INTRAVENOUS
Status: COMPLETED | OUTPATIENT
Start: 2023-10-11 | End: 2023-10-11

## 2023-10-11 RX ORDER — HYDROCODONE BITARTRATE AND ACETAMINOPHEN 7.5; 325 MG/1; MG/1
1 TABLET ORAL EVERY 6 HOURS PRN
Qty: 16 TABLET | Refills: 0 | Status: SHIPPED | OUTPATIENT
Start: 2023-10-11 | End: 2023-10-16

## 2023-10-11 RX ORDER — KETOROLAC TROMETHAMINE 30 MG/ML
15 INJECTION, SOLUTION INTRAMUSCULAR; INTRAVENOUS
Status: COMPLETED | OUTPATIENT
Start: 2023-10-11 | End: 2023-10-11

## 2023-10-11 RX ADMIN — ONDANSETRON 4 MG: 2 INJECTION INTRAMUSCULAR; INTRAVENOUS at 03:10

## 2023-10-11 RX ADMIN — KETOROLAC TROMETHAMINE 15 MG: 30 INJECTION, SOLUTION INTRAMUSCULAR; INTRAVENOUS at 03:10

## 2023-10-11 RX ADMIN — IOHEXOL 85 ML: 350 INJECTION, SOLUTION INTRAVENOUS at 06:10

## 2023-10-11 RX ADMIN — PROMETHAZINE HYDROCHLORIDE 6.25 MG: 25 INJECTION INTRAMUSCULAR; INTRAVENOUS at 04:10

## 2023-10-11 RX ADMIN — HYDROMORPHONE HYDROCHLORIDE 0.5 MG: 1 INJECTION, SOLUTION INTRAMUSCULAR; INTRAVENOUS; SUBCUTANEOUS at 05:10

## 2023-10-11 NOTE — ED TRIAGE NOTES
Pt is reporting L  hip pain since a mechanical fall a week ago. Pt has been ambulatory since the fall. Pt is also reporting nausea and sob w exertion. Pt denies chest pain. Pt recently flew back from [place] 2 days ago

## 2023-10-11 NOTE — ED PROVIDER NOTES
Encounter Date: 10/11/2023    SCRIBE #1 NOTE: IKadeem, mindy scribing for, and in the presence of,  Denisa Parker DO.       History     Chief Complaint   Patient presents with    Multiple Complaints     Presents to the ED via EMS with c/o hip pain x 1 week, epigastric pain, N/V and trouble swallowing x 1 day while on a flight.      Fabienne Goncalves is a 78 y.o. female who has a past medical history of High cholesterol, Hypothyroidism, HTN, hiatal hernia, Anemia with infusions, and Arthritis presents to the ED due to hip pain.  Patient reports a slip and fall in her driveway 2 weeks ago without noticeable trauma or localized pain immediately after the incident. Since then, she has been experiencing worsening left sided hip pain to the side and back, and diffuse rib pain that is giving her difficulty with deep breaths. Denies head trauma, syncope, back pain, neck pain, numbness, and weakness. She took hydrocodone with relief.  Patient also complains of worsening symptoms associated with her hiatal hernia including p.o. fluid/food intolerance, nausea, and vomiting. Denies abdominal pain.      The history is provided by the patient.     Review of patient's allergies indicates:   Allergen Reactions    Meperidine     Statins-hmg-coa reductase inhibitors      Flu like symptoms.     Past Medical History:   Diagnosis Date    Arthritis     Hypertension     Thyroid disease      Past Surgical History:   Procedure Laterality Date    HAND SURGERY      THYROIDECTOMY      TOTAL KNEE ARTHROPLASTY  02/2021    TUBAL LIGATION       Family History   Problem Relation Age of Onset    Osteoarthritis Mother     Glaucoma Mother     Hypertension Mother     Prostate cancer Father     No Known Problems Sister     No Known Problems Sister     No Known Problems Daughter     No Known Problems Son     No Known Problems Son     No Known Problems Son      Social History     Tobacco Use    Smoking status: Some Days     Types: Cigarettes     Smokeless tobacco: Never    Tobacco comments:     patient smokes 1-2 cigarettes when she has a drink   Substance Use Topics    Alcohol use: Yes     Alcohol/week: 1.0 standard drink of alcohol     Types: 1 Glasses of wine per week     Comment: socially    Drug use: No     Review of Systems   Constitutional:  Negative for chills and fever.   HENT:  Negative for drooling, facial swelling and trouble swallowing.    Eyes:  Negative for redness and visual disturbance.   Respiratory:  Negative for cough, shortness of breath and stridor.    Cardiovascular:  Negative for chest pain.   Gastrointestinal:  Positive for nausea and vomiting. Negative for abdominal pain.        Positive for p.o. intolerance.   Genitourinary:  Negative for dysuria and hematuria.   Musculoskeletal:  Negative for back pain, gait problem, neck pain and neck stiffness.        Positive for hip pain.  Positive for rib pain.   Skin:  Negative for pallor and wound.   Neurological:  Negative for syncope, facial asymmetry, speech difficulty, weakness and numbness.        Negative for head trauma.   Psychiatric/Behavioral:  Negative for confusion.    All other systems reviewed and are negative.      Physical Exam     Initial Vitals   BP Pulse Resp Temp SpO2   10/11/23 1351 10/11/23 1351 10/11/23 1351 10/11/23 1405 10/11/23 1351   138/82 96 18 98.8 °F (37.1 °C) 95 %      MAP       --                Physical Exam    Nursing note and vitals reviewed.  Constitutional: Vital signs are normal. She appears well-developed and well-nourished. She is not diaphoretic.  Non-toxic appearance. She does not have a sickly appearance. She does not appear ill.   HENT:   Head: Normocephalic and atraumatic.   Right Ear: External ear normal.   Left Ear: External ear normal.   Eyes: Conjunctivae and EOM are normal. Pupils are equal, round, and reactive to light. No scleral icterus.   Neck: Neck supple. No JVD present.   Normal range of motion.  Cardiovascular:  Normal rate,  regular rhythm, normal heart sounds and intact distal pulses.           Pulmonary/Chest: Breath sounds normal. No stridor. No respiratory distress.   Abdominal: Abdomen is soft. She exhibits no distension. There is no abdominal tenderness. Hernia confirmed negative in the right inguinal area and confirmed negative in the left inguinal area. There is no rebound and no guarding.   Genitourinary:    Pelvic exam was performed with patient supine.   There is no rash, tenderness, lesion or injury on the right labia. There is no rash, tenderness, lesion or injury on the left labia.    Genitourinary Comments: Nurse chaperone present. Mild tender to the area overlying bilateral superior pubic rami. No urethral meatus trauma noted.     Musculoskeletal:         General: Tenderness present. No edema. Normal range of motion.      Cervical back: Normal range of motion and neck supple.      Comments: There is tenderness with palpation to the anterior rib cage bilaterally.  No obvious crepitus or deformities.  There is tenderness with palpation to the lateral and posterior aspects to the left proximal hip.     Lymphadenopathy: No inguinal adenopathy noted on the right or left side.   Neurological: She is alert and oriented to person, place, and time. She has normal strength. She displays no atrophy and no tremor. No cranial nerve deficit or sensory deficit. She exhibits normal muscle tone. She displays no seizure activity.   Antalgic gait. Moves all extremities, follows all commands, no focal neurologic deficits.      Skin: Skin is warm and dry. No rash noted. No erythema.   Psychiatric: She has a normal mood and affect. Thought content normal.         ED Course   Critical Care    Date/Time: 10/11/2023 7:52 PM    Performed by: Denisa Parker DO  Authorized by: Denisa Parker DO  Direct patient critical care time: 35 minutes  Additional history critical care time: 10 minutes  Ordering / reviewing critical  care time: 10 minutes  Documentation critical care time: 10 minutes  Consulting other physicians critical care time: 10 minutes  Total critical care time (exclusive of procedural time) : 75 minutes  Critical care time was exclusive of separately billable procedures and treating other patients and teaching time.  Critical care was necessary to treat or prevent imminent or life-threatening deterioration of the following conditions: trauma.  Critical care was time spent personally by me on the following activities: development of treatment plan with patient or surrogate, discussions with consultants, evaluation of patient's response to treatment, examination of patient, obtaining history from patient or surrogate, ordering and performing treatments and interventions, ordering and review of laboratory studies, ordering and review of radiographic studies, pulse oximetry, re-evaluation of patient's condition and review of old charts.        Labs Reviewed   CBC W/ AUTO DIFFERENTIAL - Abnormal; Notable for the following components:       Result Value    Gran % 79.2 (*)     Lymph % 11.3 (*)     All other components within normal limits   COMPREHENSIVE METABOLIC PANEL - Abnormal; Notable for the following components:    CO2 21 (*)     Calcium 8.4 (*)     eGFR 58 (*)     All other components within normal limits   URINALYSIS, REFLEX TO URINE CULTURE - Abnormal; Notable for the following components:    Ketones, UA 2+ (*)     All other components within normal limits    Narrative:     Specimen Source->Urine   PHOSPHORUS - Abnormal; Notable for the following components:    Phosphorus 1.8 (*)     All other components within normal limits   LIPASE   MAGNESIUM     EKG Readings: (Independently Interpreted)   Normal sinus rhythm with a rate of 98 beats per minute, normal axis and intervals, no acute ST segment changes.  EKG grossly unchanged when compared to previous EKG from 2021.     ECG Results              EKG 12-lead (Final  result)  Result time 10/12/23 19:46:17      Final result by Interface, Lab [facility] (10/12/23 19:46:17)                   Narrative:    Test Reason : R11.10,    Vent. Rate : 098 BPM     Atrial Rate : 098 BPM     P-R Int : 160 ms          QRS Dur : 064 ms      QT Int : 358 ms       P-R-T Axes : 075 068 063 degrees     QTc Int : 457 ms    Normal sinus rhythm  Normal ECG  When compared with ECG of 13-JAN-2021 14:33,  No significant change was found  Confirmed by Vangie Velasquez MD ([id number]) on 10/12/2023 7:46:06 PM    Referred By: [email or web address]   SELF           Confirmed By:Vangie Velasquez MD                                     EKG 12-LEAD (Final result)  Result time 10/13/23 11:54:57      Final result by Unknown User (10/13/23 11:54:57)                                      Imaging Results               CT Chest Abdomen Pelvis With Contrast (xpd) (Final result)  Result time 10/11/23 18:43:31      Final result by Marita Fragoso MD (10/11/23 18:43:31)                   Impression:      No evidence of acute trauma to the major parenchymal organs.  Age-indeterminate comminuted fracture of the right superior pubic ramus.  Age-indeterminate fracture of the left sacrum.  Healing fracture of the right inferior pubic ramus.    Emphysematous changes.    Stable small pericardial effusion.    Moderate hiatal hernia.    Indeterminate 1.5 x 2.2 x 2.1 cm enhancing hepatic lesion.  Recommend outpatient nonemergent MRI.    7 mm indeterminate right renal cortical lesion.  Follow-up.  Bilateral renal cortical scarring.    Cholelithiasis.    Colonic diverticulosis.    This report was flagged in Epic as abnormal.      Electronically signed by: Marita Fragoso  Date:    10/11/2023  Time:    18:43               Narrative:    EXAMINATION:  CT OF CHEST ABDOMEN PELVIS WITH    CLINICAL HISTORY:  Poly blunt trauma.  Slip and fall in her driveway 2 weeks ago without noticeable trauma or localized pain immediately after incident.  Ex variance sing  worsening left-sided hip pain to the side in the back and diffuse rib pain that is given or difficulty with deep breaths.    TECHNIQUE:  5 mm enhanced axial images were obtained from the lung apices through the greater trochanters.  Eighty-five mL of Omnipaque 300 was injected.    COMPARISON:  None.    FINDINGS:  In the chest, there is no contusion or pneumothorax. There are no displaced rib fractures.  There are nonacute right-sided rib fractures.  Stable small pericardial effusion is present.  There is no pleural effusion.  The aorta is intact. The heart size is within normal limits. There is mild bibasilar atelectasis.  There are stable 2-3 mm pulmonary nodules.  Diffuse emphysematous changes are present.  A moderate hiatal hernia is present.  There is some debris within the hiatal hernia.  There is fluid in the is patulous esophagus.  Incidental note is made of sebaceous cysts in the left paramedian back.  The thyroid gland is surgically absent.    In the abdomen, there is an enhancing lesion in the quadrate lobe of the liver measuring approximately 1.5 x 2.2 x 2.1 cm (series 3 axial image 15 and series 601 coronal image 63).  There is also redemonstration of a cyst in the liver measuring 16 x 17 mm (series 3 axial image 29).  Bilateral renal cortical scarring is present.  There is a 7 mm right renal cortical lesion measuring over 50 Hounsfield units.  There also too small to characterize low attenuation lesions in both renal cortices.  There are gallstones within the folded gallbladder.  The adrenal glands are mildly lobular.  Liver, spleen, pancreas, kidneys and adrenal glands are atraumatic. There is no pneumoperitoneum or free fluid detected.  Moderate atherosclerotic changes are present.  There is a tiny fat containing umbilical hernia.    In the pelvis, the urinary bladder is distended without urinary bladder wall thickening.  There is no free fluid.  There are multiple calcified uterine fibroids.  There  is colonic diverticulosis.  There is and age-indeterminate comminuted fracture of the right superior pubic ramus.  There is an age-indeterminate fracture of the left sacrum. Healing fracture of the right inferior pubic ramus is seen.  There are mild degenerative changes at the hip joints.  Moderate scoliotic changes are present.  There are spondylitic changes.                                       X-Ray Chest AP Portable (Final result)  Result time 10/11/23 16:23:43      Final result by Kali Hernandez MD (10/11/23 16:23:43)                   Impression:      1. Interstitial findings may reflect edema.  Correlation with any history of COPD/emphysema.      Electronically signed by: Kali Hernandez MD  Date:    10/11/2023  Time:    16:23               Narrative:    EXAMINATION:  XR CHEST AP PORTABLE    CLINICAL HISTORY:  vomiting;    TECHNIQUE:  Single frontal view of the chest was performed.    COMPARISON:  01/14/2021    FINDINGS:  The cardiomediastinal silhouette is not enlarged noting calcification of the aorta.  There is a hiatal hernia..  There is no pleural effusion.  The trachea is midline.  The lungs are symmetrically expanded bilaterally with coarse interstitial attenuation bilaterally..  No large focal consolidation seen.  There is no pneumothorax.  The osseous structures are remarkable for degenerative changes and remote right rib injuries..                                       X-Ray Hip 2 or 3 views Left (with Pelvis when performed) (Final result)  Result time 10/11/23 16:27:58      Final result by Kali Hernandez MD (10/11/23 16:27:58)                   Impression:      1. No acute displaced fracture or dislocation of the left hip.  2. There is fracture of the right superior pubic ramus, may reflect subacute injury, correlation however with any focal tenderness recommended.  Further evaluation as warranted.      Electronically signed by: Kali Hernandez MD  Date:    10/11/2023  Time:    16:27                Narrative:    EXAMINATION:  XR HIP WITH PELVIS WHEN PERFORMED, 2 OR 3 VIEWS LEFT    CLINICAL HISTORY:  Pain in unspecified hip    TECHNIQUE:  AP view of the pelvis and frog leg lateral view of the left hip were performed.    COMPARISON:  None    FINDINGS:  Three views left hip.    There is osteopenia.  The bilateral sacroiliac joints are intact.  There is fracture involving the right superior pubic ramus.  The right femoroacetabular joint is intact.  The pubic symphysis is intact.  No acute displaced fracture or dislocation of the left hip.  No radiopaque foreign body.  Calcification projects over the pelvis.                                       Medications   ondansetron injection 4 mg (4 mg Intravenous Given 10/11/23 1537)   ketorolac injection 15 mg (15 mg Intravenous Given 10/11/23 1538)   promethazine (PHENERGAN) 6.25 mg in dextrose 5 % (D5W) 50 mL IVPB (0 mg Intravenous Stopped 10/11/23 1701)   HYDROmorphone injection 0.5 mg (0.5 mg Intravenous Given 10/11/23 1727)   iohexoL (OMNIPAQUE 350) injection 85 mL (85 mLs Intravenous Given 10/11/23 1810)     Medical Decision Making   MDM  This is an emergent evaluation of a 78-year-old female with past medical history of hyper lipidemia, arthritis, hypertension, hypothyroidism and previously diagnosed hiatal hernia who presents with a 2 week history of left hip pain, rib pain after a mechanical fall. Initial vitals in the ED     BP: 138/82 [10/11/23 1351]  Pulse: 96 [10/11/23 1351]  Resp: 18 [10/11/23 1351]  Temp: 98.8 °F (37.1 °C) [10/11/23 1405]  SpO2: 95 % [10/11/23 1351] .     Physical exam noted above. DDx includes but is not limited to fracture, dislocation, hiatal hernia, gastritis, pancreatitis, dehydration, electrolyte abnormality, ACS, UTI. Also considered but clinically less likely to be  sepsis, stroke, PE, dissection. Will obtain labs and imaging including CBC, CMP, lipase, UA, phosphorus, magnesium, EKG, chest x-ray, x-ray of the left hip.  Will also provide  IV pain medications, IV Zofran for nausea. Will continue to monitor and frequently reassess pending results of labs, treatments and final disposition.    Patient is aware of plan and is amenable.     Denisa Parker D.O  EMERGENCY MEDICINE  3:43 PM 10/11/2023    UPDATE  Labs reveal hypophosphatemia.  Remainder of labs unremarkable.  Chest x-ray reveals a hiatal hernia with coarse interstitial attenuation bilaterally.  No focal large consolidations or pneumothorax.  Osseous structure unremarkable.  There is a remote right rib injury.  X-ray of the left hip reveals a right superior pubic rami fracture that appears subacute.  EKG shows no acute STEMI.  Patient was initially treated with IV Toradol and IV Zofran for her symptoms.  On reassessment she continued to complain of pain and nausea.  She was then treated with IV Phenergan and Dilaudid.  Given workup findings, I also obtain a CT scan of the abdomen pelvis to rule out any other intra-abdominal trauma.  CT abdomen pelvis reveals a moderate hiatal hernia, a stable small pericardial effusion.  A nonacute right-sided rib fracture.  There is also an incidental noted sebaceous cyst in the left paramedian back.  An enhancing lesion in the left quadrant of the lobe of the liver.  Moderate atherosclerotic changes.  There are multiple calcified uterine fibroids.  Colonic diverticulosis.  An age indeterminate comminuted fracture of the right superior pubic ramus.  There is also age-indeterminate fracture of the left sacrum in a healing fracture of the right inferior pubic ramus.  On reassessment, patient's symptoms were improved.  Patient was consulted to Orthopedic surgery and case was discussed with Dr. Serge Mahajan, who recommended that patient okay for discharge with orthopedic follow-up.  He recommended patient okay for weightbear as tolerated but made want to have a walker for comfort.  I discussed these recommendations with the patient and  she states she does have a walker at home and will use it.  Will also discharge with a short course of pain medication as well as Zofran.  Will also give discharge with PCP and ED return precautions.  Patient is aware and agreeable to plan.    Denisa Parker D.O  EMERGENCY MEDICINE   7:42 PM 10/11/2023       This chart was completed using dictation software, as a result there may be some transcription errors      Amount and/or Complexity of Data Reviewed  External Data Reviewed: labs, ECG and notes.  Labs: ordered. Decision-making details documented in ED Course.  Radiology: ordered and independent interpretation performed. Decision-making details documented in ED Course.  ECG/medicine tests: ordered and independent interpretation performed. Decision-making details documented in ED Course.    Risk  Prescription drug management.                               I, Denisa Parker DO, personally performed the services described in this documentation. All medical record entries made by the scribe were at my direction and in my presence. I have reviewed the chart and agree that the record reflects my personal performance and is accurate and complete.    Clinical Impression:   Final diagnoses:  [R11.10] Vomiting  [M25.559] Hip pain  [S32.120A] Closed nondisplaced zone II fracture of sacrum, initial encounter (Primary)  [S32.591A] Closed fracture of ramus of right pubis, initial encounter        ED Disposition Condition    Discharge Stable          ED Prescriptions       Medication Sig Dispense Start Date End Date Auth. Provider    HYDROcodone-acetaminophen (NORCO) 7.5-325 mg per tablet Take 1 tablet by mouth every 6 (six) hours as needed for Pain. 16 tablet 10/11/2023 10/16/2023 Denisa Parker, DO    ondansetron (ZOFRAN-ODT) 4 MG TbDL (Expires today) Take 1 tablet (4 mg total) by mouth every 6 (six) hours as needed (nausea and vomiting). 12 tablet 10/11/2023 10/14/2023 Denisa Parker,  DO          Follow-up Information       Follow up With Specialties Details Why Contact Info    Srege Mahajan MD Orthopedic Surgery Schedule an appointment as soon as possible for a visit on 10/16/2023 Emergency Room Follow-up [address] [facility] [address]  [phone]      [facility] Emergency Dept Emergency Medicine Go to  If symptoms worsen [address]  [facility] [id number] [phone]    Gordo Canela MD Internal Medicine Schedule an appointment as soon as possible for a visit  As needed [address] [facility] [address]  [phone]               Denisa Parker,   10/14/23 1150

## 2023-10-12 NOTE — DISCHARGE INSTRUCTIONS
Thank you for coming to our Emergency Department today. It is important to remember that some problems or medical conditions are difficult to diagnose and may not be found during your Emergency Department visit.     Be sure to follow up with your primary care doctor and review all labs/imaging/tests that were performed during your ER visit with them. Some labs/tests may be outside of the normal range and require non-emergent follow-up and further investigation to help diagnose/exclude/prevent complications or other potentially serious medical conditions that were not addressed during your ER visit.    If you do not have a primary care doctor, you may contact the one listed on your discharge paperwork or you may also call the [facility] Appointment Desk at [phone] to schedule an appointment and establish care with one. It is important to your health that you have a primary care doctor.    Please take all medications as directed. All medications may potentially have side-effects and it is impossible to predict which medications may give you side-effects or what side-effects (if any) they will give you.. If you feel that you are having a negative effect or side-effect of any medication you should immediately stop taking them and seek medical attention. If you feel that you are having a life-threatening reaction call 911.    Return to the ER with any questions/concerns, new/concerning symptoms, worsening or failure to improve.     Do not drive, swim, climb to height, take a bath, operate heavy machinery, drink alcohol or take potentially sedating medications, sign any legal documents or make any important decisions for 24 hours if you have received any pain medications, sedatives or mood altering drugs during your ER visit or within 24 hours of taking them if they have been prescribed to you.     You can find additional resources for Dentists, hearing aids, durable medical equipment, low cost pharmacies and  other resources at [email or web address]    BELOW THIS LINE ONLY APPLIES IF YOU HAVE A COVID TEST PENDING OR IF YOU HAVE BEEN DIAGNOSED WITH COVID:  Please access [facility] to review the results of your test. Until the results of your COVID test return, you should isolate yourself so as not to potentially spread illness to others.   If your COVID test returns positive, you should isolate yourself so as not to spread illness to others. After five full days, if you are feeling better and you have not had fever for 24 hours, you can return to your typical daily activities, but you must wear a mask around others for an additional 5 days.   If your COVID test returns negative and you are either unvaccinated or more than six months out from your two-dose vaccine and are not yet boosted, you should still quarantine for 5 full days followed by strict mask use for an additional 5 full days.   If your COVID test returns negative and you have received your 2-dose initial vaccine as well as a booster, you should continue strict mask use for 10 full days after the exposure.  For all those exposed, best practice includes a test at day 5 after the exposure. This can be a home test or a test through one of the many testing centers throughout our community.   Masking is always advised to limit the spread of COVID. Cdc.gov is an excellent site to obtain the latest up to date recommendations regarding COVID and COVID testing.     CDC Testing and Quarantine Guidelines for patients with exposure to a known-positive COVID-19 person:  A close exposure is defined as anyone who has had an exposure (masked or unmasked) to a known COVID -19 positive person within 6 feet of someone for a cumulative total of 15 minutes or more over a 24-hour period.   Vaccinated and/or if you recently had a positive covid test within 90 days do NOT need to quarantine after contact with someone who had COVID-19 unless you develop symptoms.   Fully vaccinated  people who have not had a positive test within 90 days, should get tested 3-5 days after their exposure, even if they don't have symptoms and wear a mask indoors in public for 14 days following exposure or until their test result is negative.      Unvaccinated and/or NOT had a positive test within 90 days and meet close exposure  You are required by CDC guidelines to quarantine for at least 5 days from time of exposure followed by 5 days of strict masking. It is recommended, but not required to test after 5 days, unless you develop symptoms, in which case you should test at that time.  If you get tested after 5 days and your test is positive, your 5 day period of isolation starts the day of the positive test.    If your exposure does not meet the above definition, you can return to your normal daily activities to include social distancing, wearing a mask and frequent handwashing.      Here is a link to guidance from the CDC:  https://www.cdc.gov/media/releases/2021/s1227-isolation-quarantine-guidance.html      [place] Dept Of Health Testing Sites:  https://ldh.la.gov/page/3934      [facility] website with testing locations and guidance:  [email or web address]

## 2023-10-17 ENCOUNTER — TELEPHONE (OUTPATIENT)
Dept: FAMILY MEDICINE | Facility: CLINIC | Age: 78
End: 2023-10-17
Payer: MEDICARE

## 2023-10-17 NOTE — TELEPHONE ENCOUNTER
----- Message from Red Nixon sent at 10/13/2023  2:09 PM CDT -----  Regarding: Self [phone]  Type:  Sooner Appointment Request    Patient is requesting a sooner appointment.  Patient declined first available appointment listed as well as another facility and provider .  Patient will not accept being placed on the waitlist and is requesting a message be sent to doctor.    Name of Caller:  Self     When is the first available appointment? November 2023     Symptoms:  hernia     Would the patient rather a call back or a response via [facility]?  Call back     Best Call Back Number:  [phone]     Additional Information:

## 2023-10-17 NOTE — TELEPHONE ENCOUNTER
Spoke with patient in reference to her getting an ED Follow up visit for her fracture. Patient chose to see another doctor as she was told there was no availability until Feb 2024 by the Call Center.

## 2023-12-11 DIAGNOSIS — K21.9 GASTROESOPHAGEAL REFLUX DISEASE WITHOUT ESOPHAGITIS: ICD-10-CM

## 2023-12-11 NOTE — TELEPHONE ENCOUNTER
No care due was identified.  [facility] Embedded Care Due Messages. Reference number: [id number].   12/11/2023 12:28:46 PM CST

## 2023-12-12 RX ORDER — PANTOPRAZOLE SODIUM 20 MG/1
20 TABLET, DELAYED RELEASE ORAL
Qty: 90 TABLET | Refills: 3 | Status: SHIPPED | OUTPATIENT
Start: 2023-12-12

## 2023-12-12 RX ORDER — HYDROCHLOROTHIAZIDE 25 MG/1
25 TABLET ORAL
Qty: 90 TABLET | Refills: 3 | OUTPATIENT
Start: 2023-12-12

## 2023-12-12 RX ORDER — LEVOTHYROXINE SODIUM 88 UG/1
88 TABLET ORAL
Qty: 90 TABLET | Refills: 3 | OUTPATIENT
Start: 2023-12-12

## 2023-12-22 ENCOUNTER — INFUSION (OUTPATIENT)
Dept: INFUSION THERAPY | Facility: HOSPITAL | Age: 78
End: 2023-12-22
Attending: HOSPITALIST
Payer: MEDICARE

## 2023-12-22 VITALS
DIASTOLIC BLOOD PRESSURE: 64 MMHG | RESPIRATION RATE: 18 BRPM | OXYGEN SATURATION: 96 % | HEART RATE: 69 BPM | SYSTOLIC BLOOD PRESSURE: 127 MMHG | TEMPERATURE: 98 F

## 2023-12-22 DIAGNOSIS — M81.0 AGE-RELATED OSTEOPOROSIS WITHOUT CURRENT PATHOLOGICAL FRACTURE: Primary | ICD-10-CM

## 2023-12-22 PROCEDURE — 96372 THER/PROPH/DIAG INJ SC/IM: CPT

## 2023-12-22 PROCEDURE — 63600175 PHARM REV CODE 636 W HCPCS: Mod: JZ,JG | Performed by: HOSPITALIST

## 2023-12-22 RX ADMIN — DENOSUMAB 60 MG: 60 INJECTION SUBCUTANEOUS at 09:12

## 2024-01-11 ENCOUNTER — PATIENT OUTREACH (OUTPATIENT)
Dept: ADMINISTRATIVE | Facility: HOSPITAL | Age: 79
End: 2024-01-11
Payer: MEDICARE

## 2024-01-11 ENCOUNTER — PATIENT MESSAGE (OUTPATIENT)
Dept: ADMINISTRATIVE | Facility: HOSPITAL | Age: 79
End: 2024-01-11
Payer: MEDICARE

## 2024-01-22 NOTE — TELEPHONE ENCOUNTER
Please call pt - her bone density scan shows severe osteoporosis.  She had had bone density scans done before but I don't have access to them regarding how severe her osteoporosis was before.  I am going to send her to endocrinology to discuss treatment options - she might be able to take a pill for this but she has chronic kidney disease and I want their opinion about this.    Also, it looks like she is filling her thyroid medicine but she's filling the 88 mcg.  She should be taking the 75 mcg.  I will re-send it to pharmacy.   [Follow-Up: _____] : a [unfilled] follow-up visit

## 2024-03-04 ENCOUNTER — PATIENT MESSAGE (OUTPATIENT)
Dept: ADMINISTRATIVE | Facility: HOSPITAL | Age: 79
End: 2024-03-04
Payer: MEDICARE